# Patient Record
Sex: FEMALE | Race: WHITE | NOT HISPANIC OR LATINO | Employment: OTHER | URBAN - METROPOLITAN AREA
[De-identification: names, ages, dates, MRNs, and addresses within clinical notes are randomized per-mention and may not be internally consistent; named-entity substitution may affect disease eponyms.]

---

## 2017-01-05 ENCOUNTER — TELEPHONE (OUTPATIENT)
Dept: FAMILY MEDICINE CLINIC | Facility: CLINIC | Age: 82
End: 2017-01-05

## 2017-01-05 RX ORDER — EZETIMIBE AND SIMVASTATIN 10; 20 MG/1; MG/1
1 TABLET ORAL NIGHTLY
Qty: 90 TABLET | Refills: 3 | Status: SHIPPED | OUTPATIENT
Start: 2017-01-05 | End: 2017-01-09

## 2017-01-05 NOTE — TELEPHONE ENCOUNTER
LEFT MESSAGE LETTING HER KNOW WE ARE OUT OF THESES SAMPLES.    ----- Message from Sissy Sanon sent at 1/3/2017 10:11 AM EST -----  Contact: PATIENT  PT HAS 6 DAYS LFET OF MEDS AND WANTS TO KNOW IF YOU HAVE ANY SAMPLES SHE CAN .    10-20  OR 20-20 VYTORIN    [phone] - PLEASE LET PT KNOW

## 2017-01-09 ENCOUNTER — TELEPHONE (OUTPATIENT)
Dept: FAMILY MEDICINE CLINIC | Facility: CLINIC | Age: 82
End: 2017-01-09

## 2017-01-09 RX ORDER — SUMATRIPTAN 100 MG/1
TABLET, FILM COATED ORAL
Qty: 243 TABLET | Refills: 0 | Status: SHIPPED | OUTPATIENT
Start: 2017-01-09 | End: 2017-03-02 | Stop reason: SDUPTHER

## 2017-01-09 RX ORDER — ATORVASTATIN CALCIUM 20 MG/1
20 TABLET, FILM COATED ORAL DAILY
Qty: 30 TABLET | Refills: 5 | Status: SHIPPED | OUTPATIENT
Start: 2017-01-09 | End: 2017-08-09 | Stop reason: SDUPTHER

## 2017-01-09 NOTE — TELEPHONE ENCOUNTER
PATIENT AWARE. RX SENT    ----- Message from Rafat Cline MD sent at 1/9/2017  1:37 PM EST -----  Contact: pt  We can try atorvastatin 20 mg tablets one daily #30 with 5 refills.  Tell the patient that we will see how well this works regarding her cholesterol when she comes for her next checkup.  ----- Message -----     From: Fiordaliza Cruz MA     Sent: 1/6/2017   1:01 PM       To: Rafat Cline MD        ----- Message -----     From: Majo Gorman     Sent: 1/6/2017  11:56 AM       To: Fiordaliza Cruz MA    Pt said rx was sent to her mail order for her vytorin 10-20 1 tab at night #90 and pt said it will cost her $115 for a 3 month supply and she can't afford that. Pt is asking if dr carver will change her to lipitor because it is FREE at [facility] ln [phone].  I did not tell pt that they are not the same type of medication. I will let you explain that.    Pt's # [phone]

## 2017-01-09 NOTE — TELEPHONE ENCOUNTER
This has already been [facility] for [facility]    ----- Message from Sissy Sanon sent at 1/9/2017 12:37 PM EST -----  Contact: PATIENT  SUMAtriptan (IMITREX) 100 MG tablet     [facility] SENT A FAX.  CAN THE PT GET 90 DAYS?  THE COST IS BETTER.    [phone]- PLEASE CALL THE PATIENT

## 2017-02-09 ENCOUNTER — OFFICE VISIT (OUTPATIENT)
Dept: FAMILY MEDICINE CLINIC | Facility: CLINIC | Age: 82
End: 2017-02-09

## 2017-02-09 VITALS
HEART RATE: 56 BPM | HEIGHT: 65 IN | WEIGHT: 142 LBS | BODY MASS INDEX: 23.66 KG/M2 | OXYGEN SATURATION: 97 % | SYSTOLIC BLOOD PRESSURE: 98 MMHG | DIASTOLIC BLOOD PRESSURE: 58 MMHG | TEMPERATURE: 97.9 F

## 2017-02-09 DIAGNOSIS — R06.02 SOB (SHORTNESS OF BREATH): Primary | ICD-10-CM

## 2017-02-09 DIAGNOSIS — M54.6 ACUTE LEFT-SIDED THORACIC BACK PAIN: ICD-10-CM

## 2017-02-09 DIAGNOSIS — R06.00 PAROXYSMAL DYSPNEA: ICD-10-CM

## 2017-02-09 PROCEDURE — 99213 OFFICE O/P EST LOW 20 MIN: CPT

## 2017-02-09 NOTE — PROGRESS NOTES
Subjective   Sirisha Auguste is a 81 y.o. female. Patient is here today for   Chief Complaint   Patient presents with   • Shortness of Breath     patient says it hurts when she breaths in on her left side that radiates around the back          Vitals:    02/09/17 1243   BP: 98/58   Pulse: 56   Temp: 97.9 °F (36.6 °C)   SpO2: 97%     The following portions of the patient's history were reviewed and updated as appropriate: allergies, current medications, past family history, past medical history, past social history, past surgical history and problem list.    Past Medical History   Diagnosis Date   • Arthritis    • Breast cyst    • Chronic kidney disease    • GERD (gastroesophageal reflux disease)    • Hyperglycemia    • Hyperlipidemia    • Hypertension    • Migraines    • Murmur    • RBBB       Allergies   Allergen Reactions   • Sulfa Antibiotics       Social History     Social History   • Marital status:      Spouse name: N/A   • Number of children: N/A   • Years of education: N/A     Occupational History   • Not on file.     Social History Main Topics   • Smoking status: Former Smoker   • Smokeless tobacco: Not on file   • Alcohol use Not on file   • Drug use: Not on file   • Sexual activity: Not on file     Other Topics Concern   • Not on file     Social History Narrative        Current Outpatient Prescriptions:   •  acetaminophen (TYLENOL) 650 MG 8 hr tablet, Take 650 mg by mouth every 8 (eight) hours as needed for mild pain (1-3)., Disp: , Rfl:   •  ALPRAZolam (XANAX) 0.5 MG tablet, Take 1 tablet by mouth 3 (three) times a day as needed for anxiety., Disp: 90 tablet, Rfl: 0  •  atorvastatin (LIPITOR) 20 MG tablet, Take 1 tablet by mouth Daily., Disp: 30 tablet, Rfl: 5  •  butalbital-aspirin-caffeine-codeine (FIORINAL WITH CODEINE) -25-30 MG capsule, Take 1 capsule by mouth every 6 (six) hours as needed for headaches., Disp: 90 capsule, Rfl: 0  •  Calcium Carbonate-Vitamin D (CALCIUM + D PO), Take   by mouth., Disp: , Rfl:   •  Cholecalciferol (VITAMIN D-3) 1000 UNITS capsule, Take  by mouth., Disp: , Rfl:   •  fluticasone (FLONASE) 50 MCG/ACT nasal spray, into each nostril., Disp: , Rfl:   •  gabapentin (NEURONTIN) 100 MG capsule, Take 1 capsule by mouth 3 (three) times a day., Disp: 270 capsule, Rfl: 3  •  meclizine (ANTIVERT) 25 MG tablet, Take  by mouth., Disp: , Rfl:   •  metoprolol succinate XL (TOPROL-XL) 50 MG 24 hr tablet, TAKE 1 TABLET DAILY, Disp: 90 tablet, Rfl: 3  •  montelukast (SINGULAIR) 10 MG tablet, TAKE 1 TABLET EVERY NIGHT, Disp: 90 tablet, Rfl: 3  •  Multiple Minerals-Vitamins (ADVANCED CALCIUM/D/MAGNESIUM) tablet, Take  by mouth., Disp: , Rfl:   •  pantoprazole (PROTONIX) 40 MG EC tablet, TAKE 1 TABLET DAILY, Disp: 90 tablet, Rfl: 3  •  promethazine-codeine (PHENERGAN with CODEINE) 6.25-10 MG/5ML syrup, Take 5 mL by mouth Every 4 (Four) Hours As Needed for cough., Disp: 180 mL, Rfl: 0  •  SUMAtriptan (IMITREX) 100 MG tablet, TAKE 1 TABLET EVERY 2 HOURS AS NEEDED FOR MIGRAINE ( MG DAILY), Disp: 243 tablet, Rfl: 0  •  triamterene-hydrochlorothiazide (MAXZIDE-25) 37.5-25 MG per tablet, TAKE 1 TABLET DAILY, Disp: 90 tablet, Rfl: 1     Objective     History of Present Illness   The patient is here today because she has chronic shortness of air but has developed some back pain over the last week or so.  This pain seems to radiate around her left lateral rib cage at times    Review of Systems   Constitutional: Negative for chills and fever.   HENT:        Chronic allergic rhinitis with postnasal drainage   Respiratory: Negative for cough and wheezing.         The patient has been chronically short of air and she states that her present degree of shortness of air is not really any worse    Cardiovascular: Negative for chest pain, palpitations and leg swelling.   Gastrointestinal: Negative.    Musculoskeletal:        The patient states that she is having pain in the thoracic spine area off  and on and it radiates to the left side and around her lateral rib cage at times.  However the pain is not constant and not associated with acute shortness of air, nausea or diaphoresis.  There is some positional component to this discomfort but the patient does not state it is worse when she is standing and it does not necessarily go away when she lies down   Neurological:        Long history of migraine headaches   Psychiatric/Behavioral: Negative.        Physical Exam   Constitutional: She appears well-developed and well-nourished.   The patient is in no acute distress   Cardiovascular: Normal rate, regular rhythm and normal heart sounds.    Grade 3/6 systolic murmur heard best at the upper sternal border   Pulmonary/Chest: Effort normal and breath sounds normal. No respiratory distress. She has no wheezes. She has no rales.   Abdominal: Soft. Bowel sounds are normal. She exhibits no distension and no mass. There is no tenderness.   Musculoskeletal:   There is no direct tenderness of the thoracic spine.  There is slight tenderness in the parathoracic spinal muscles.   Skin:   In the area of the patient's discomfort.  She does have an eczematoid type rash in the upper anterior chest area which is not painful, just mildly pruritic   Psychiatric: She has a normal mood and affect.   Nursing note and vitals reviewed.      ASSESSMENT  #1 episodic mid back pain with occasional radiation laterally on the left side        #2 chronic shortness of air    DISCUSSION/SUMMARY   Vital signs are normal today.  The patient is in no acute distress.  The patient tells me that her shortness of air really is about the same as it has been for a number of years.  The patient had a complete cardiac workup in October 2015 including a stress test and echocardiogram.  Her stress test was normal.  Her echocardiogram showed mild mitral insufficiency and some left ventricular concentric hypertrophy.  She also had mild diastolic dysfunction.   Chest x-ray today was normal except for an elevated right hemidiaphragm which has been present for at least 5 years.  We discussed the patient's thoracic pain and I feel that the problem may be a muscle strain because she does do water aerobics on a regular basis.  The pain is not constant and does not seem to be worse when the patient is standing so I don't think she has had a compression fracture.  However I did tell her that if the pain becomes constant in the thoracic spine and is worse when she is standing to call us so we can set up a spine x-ray.  If the patient's shortness of air or worsens I will reorder another echocardiogram.  The patient states that she is better now with respect to the pain in that last night a baclofen tablet also helped her pain.  I told her that it would be okay for her to use baclofen and the over-the-counter acetaminophen that she has at home.  The patient has an appointment within the next 2 months with labs.  The patient's oxygen saturation today was 97%.    PLAN  Call if back pain worsens.  No Follow-up on file.

## 2017-02-14 ENCOUNTER — APPOINTMENT (OUTPATIENT)
Dept: CT IMAGING | Facility: HOSPITAL | Age: 82
End: 2017-02-14

## 2017-02-14 ENCOUNTER — HOSPITAL ENCOUNTER (EMERGENCY)
Facility: HOSPITAL | Age: 82
Discharge: HOME | End: 2017-02-14
Attending: EMERGENCY MEDICINE | Admitting: EMERGENCY MEDICINE

## 2017-02-14 VITALS
TEMPERATURE: 98.4 F | HEART RATE: 72 BPM | RESPIRATION RATE: 16 BRPM | OXYGEN SATURATION: 98 % | DIASTOLIC BLOOD PRESSURE: 63 MMHG | HEIGHT: 64 IN | WEIGHT: 137 LBS | BODY MASS INDEX: 23.39 KG/M2 | SYSTOLIC BLOOD PRESSURE: 139 MMHG

## 2017-02-14 DIAGNOSIS — R42 VERTIGO: ICD-10-CM

## 2017-02-14 DIAGNOSIS — R11.2 NON-INTRACTABLE VOMITING WITH NAUSEA, UNSPECIFIED VOMITING TYPE: Primary | ICD-10-CM

## 2017-02-14 LAB
ALBUMIN SERPL-MCNC: 4.5 G/DL (ref 3.5–5.2)
ALBUMIN/GLOB SERPL: 1.3 G/DL
ALP SERPL-CCNC: 89 U/L (ref 39–117)
ALT SERPL W P-5'-P-CCNC: 9 U/L (ref 1–33)
ANION GAP SERPL CALCULATED.3IONS-SCNC: 16.5 MMOL/L
AST SERPL-CCNC: 22 U/L (ref 1–32)
BASOPHILS # BLD AUTO: 0.01 10*3/MM3 (ref 0–0.2)
BASOPHILS NFR BLD AUTO: 0.1 % (ref 0–1.5)
BILIRUB SERPL-MCNC: 1.3 MG/DL (ref 0.1–1.2)
BILIRUB UR QL STRIP: NEGATIVE
BUN BLD-MCNC: 25 MG/DL (ref 8–23)
BUN/CREAT SERPL: 26 (ref 7–25)
CALCIUM SPEC-SCNC: 10.6 MG/DL (ref 8.6–10.5)
CHLORIDE SERPL-SCNC: 92 MMOL/L (ref 98–107)
CLARITY UR: CLEAR
CO2 SERPL-SCNC: 23.5 MMOL/L (ref 22–29)
COLOR UR: YELLOW
CREAT BLD-MCNC: 0.96 MG/DL (ref 0.57–1)
DEPRECATED RDW RBC AUTO: 40.2 FL (ref 37–54)
EOSINOPHIL # BLD AUTO: 0.01 10*3/MM3 (ref 0–0.7)
EOSINOPHIL NFR BLD AUTO: 0.1 % (ref 0.3–6.2)
ERYTHROCYTE [DISTWIDTH] IN BLOOD BY AUTOMATED COUNT: 12.6 % (ref 11.7–13)
GFR SERPL CREATININE-BSD FRML MDRD: 56 ML/MIN/1.73
GLOBULIN UR ELPH-MCNC: 3.4 GM/DL
GLUCOSE BLD-MCNC: 158 MG/DL (ref 65–99)
GLUCOSE UR STRIP-MCNC: NEGATIVE MG/DL
HCT VFR BLD AUTO: 40.7 % (ref 35.6–45.5)
HGB BLD-MCNC: 14 G/DL (ref 11.9–15.5)
HGB UR QL STRIP.AUTO: NEGATIVE
HOLD SPECIMEN: NORMAL
HOLD SPECIMEN: NORMAL
IMM GRANULOCYTES # BLD: 0.03 10*3/MM3 (ref 0–0.03)
IMM GRANULOCYTES NFR BLD: 0.2 % (ref 0–0.5)
KETONES UR QL STRIP: ABNORMAL
LEUKOCYTE ESTERASE UR QL STRIP.AUTO: NEGATIVE
LIPASE SERPL-CCNC: 35 U/L (ref 13–60)
LYMPHOCYTES # BLD AUTO: 1.01 10*3/MM3 (ref 0.9–4.8)
LYMPHOCYTES NFR BLD AUTO: 8.1 % (ref 19.6–45.3)
MCH RBC QN AUTO: 30.3 PG (ref 26.9–32)
MCHC RBC AUTO-ENTMCNC: 34.4 G/DL (ref 32.4–36.3)
MCV RBC AUTO: 88.1 FL (ref 80.5–98.2)
MONOCYTES # BLD AUTO: 0.21 10*3/MM3 (ref 0.2–1.2)
MONOCYTES NFR BLD AUTO: 1.7 % (ref 5–12)
NEUTROPHILS # BLD AUTO: 11.17 10*3/MM3 (ref 1.9–8.1)
NEUTROPHILS NFR BLD AUTO: 89.8 % (ref 42.7–76)
NITRITE UR QL STRIP: NEGATIVE
PH UR STRIP.AUTO: <=5 [PH] (ref 5–8)
PLATELET # BLD AUTO: 204 10*3/MM3 (ref 140–500)
PMV BLD AUTO: 11.4 FL (ref 6–12)
POTASSIUM BLD-SCNC: 3.4 MMOL/L (ref 3.5–5.2)
PROT SERPL-MCNC: 7.9 G/DL (ref 6–8.5)
PROT UR QL STRIP: NEGATIVE
RBC # BLD AUTO: 4.62 10*6/MM3 (ref 3.9–5.2)
SODIUM BLD-SCNC: 132 MMOL/L (ref 136–145)
SP GR UR STRIP: 1.02 (ref 1–1.03)
UROBILINOGEN UR QL STRIP: ABNORMAL
WBC NRBC COR # BLD: 12.44 10*3/MM3 (ref 4.5–10.7)
WHOLE BLOOD HOLD SPECIMEN: NORMAL
WHOLE BLOOD HOLD SPECIMEN: NORMAL

## 2017-02-14 PROCEDURE — 25010000002 ONDANSETRON PER 1 MG: Performed by: EMERGENCY MEDICINE

## 2017-02-14 PROCEDURE — 80053 COMPREHEN METABOLIC PANEL: CPT | Performed by: EMERGENCY MEDICINE

## 2017-02-14 PROCEDURE — 36415 COLL VENOUS BLD VENIPUNCTURE: CPT

## 2017-02-14 PROCEDURE — 72125 CT NECK SPINE W/O DYE: CPT

## 2017-02-14 PROCEDURE — 85025 COMPLETE CBC W/AUTO DIFF WBC: CPT | Performed by: EMERGENCY MEDICINE

## 2017-02-14 PROCEDURE — 70450 CT HEAD/BRAIN W/O DYE: CPT

## 2017-02-14 PROCEDURE — 99284 EMERGENCY DEPT VISIT MOD MDM: CPT

## 2017-02-14 PROCEDURE — 96374 THER/PROPH/DIAG INJ IV PUSH: CPT

## 2017-02-14 PROCEDURE — 83690 ASSAY OF LIPASE: CPT | Performed by: EMERGENCY MEDICINE

## 2017-02-14 PROCEDURE — 81003 URINALYSIS AUTO W/O SCOPE: CPT | Performed by: EMERGENCY MEDICINE

## 2017-02-14 PROCEDURE — 96361 HYDRATE IV INFUSION ADD-ON: CPT

## 2017-02-14 RX ORDER — ONDANSETRON 8 MG/1
8 TABLET, ORALLY DISINTEGRATING ORAL EVERY 8 HOURS PRN
Qty: 15 TABLET | Refills: 0 | Status: SHIPPED | OUTPATIENT
Start: 2017-02-14 | End: 2018-03-20

## 2017-02-14 RX ORDER — ONDANSETRON 2 MG/ML
4 INJECTION INTRAMUSCULAR; INTRAVENOUS ONCE
Status: COMPLETED | OUTPATIENT
Start: 2017-02-14 | End: 2017-02-14

## 2017-02-14 RX ORDER — PROMETHAZINE HYDROCHLORIDE 25 MG/1
50 SUPPOSITORY RECTAL EVERY 6 HOURS PRN
COMMUNITY
End: 2017-11-02

## 2017-02-14 RX ORDER — SODIUM CHLORIDE 0.9 % (FLUSH) 0.9 %
10 SYRINGE (ML) INJECTION AS NEEDED
Status: DISCONTINUED | OUTPATIENT
Start: 2017-02-14 | End: 2017-02-14 | Stop reason: HOSPADM

## 2017-02-14 RX ADMIN — ONDANSETRON 4 MG: 2 INJECTION INTRAMUSCULAR; INTRAVENOUS at 05:02

## 2017-02-14 RX ADMIN — SODIUM CHLORIDE 1000 ML: 9 INJECTION, SOLUTION INTRAVENOUS at 05:03

## 2017-02-14 NOTE — ED PROVIDER NOTES
EMERGENCY DEPARTMENT ENCOUNTER    CHIEF COMPLAINT  Chief Complaint: Vomiting  History given by: Pt  History limited by: N/A  Room Number: 10/10  PMD: Rafat Cline MD      HPI:  Pt is a 81 y.o. female who presents complaining of vomiting since 6:15 PM last night. She also c/o generalized weakness, nausea, dry cough, and dizziness. She denies diarrhea, abd pain, chest pain, and tinnitus. Family reports that pt has Meniere's disease. Dr. Glaser is her cardiologist and pt reports extensive cardiac workup in 2015.    Duration:  10 hours  Onset: Sudden  Timing: Constant  Location: GI  Radiation: None  Quality: N/A  Intensity/Severity: Moderate  Progression: Improving  Associated Symptoms: Generalized weakness, dizziness  Aggravating Factors: Nothing  Alleviating Factors: Nothing  Previous Episodes: None specified  Treatment before arrival: None specified    PAST MEDICAL HISTORY  Active Ambulatory Problems     Diagnosis Date Noted   • Benign essential hypertension 06/09/2016   • Hyperglycemia 06/09/2016   • Gastroesophageal reflux disease 06/09/2016   • Hyperlipidemia 06/09/2016   • Migraine 06/09/2016   • Renal insufficiency 06/09/2016   • Heart murmur 06/09/2016   • 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   • Herpes zoster 06/09/2016   • Acute thoracic back pain 02/09/2017   • Paroxysmal dyspnea 02/09/2017     Resolved Ambulatory Problems     Diagnosis Date Noted   • No Resolved Ambulatory Problems     Past Medical History   Diagnosis Date   • Arthritis    • Breast cyst    • Chronic kidney disease    • GERD (gastroesophageal reflux disease)    • Hypertension    • Migraines    • Murmur    • RBBB        PAST SURGICAL HISTORY  Past Surgical History   Procedure Laterality Date   • Bladder surgery     • Hysterectomy     • Breast cyst aspiration     • Oophorectomy         FAMILY HISTORY  Family History   Problem Relation Age of Onset   • Cancer Sister      breast   • Breast cancer Sister    • Cancer Maternal Aunt       breast   • Breast cancer Maternal Aunt        SOCIAL HISTORY  Social History     Social History   • Marital status:      Spouse name: N/A   • Number of children: N/A   • Years of education: N/A     Occupational History   • Not on file.     Social History Main Topics   • Smoking status: Former Smoker   • Smokeless tobacco: Not on file   • Alcohol use Not on file   • Drug use: Not on file   • Sexual activity: Not on file     Other Topics Concern   • Not on file     Social History Narrative       ALLERGIES  Sulfa antibiotics    REVIEW OF SYSTEMS  Review of Systems   Constitutional: Negative for chills and fever.   HENT: Negative for sore throat and trouble swallowing.    Eyes: Negative for visual disturbance.   Respiratory: Positive for cough (dry). Negative for shortness of breath.    Cardiovascular: Negative for chest pain, palpitations and leg swelling.   Gastrointestinal: Positive for nausea and vomiting. Negative for abdominal pain and diarrhea.   Endocrine: Negative.    Genitourinary: Negative for decreased urine volume, dysuria and frequency.   Musculoskeletal: Negative for neck pain.   Skin: Negative for rash.   Allergic/Immunologic: Negative.    Neurological: Positive for dizziness and weakness (generalized). Negative for syncope, numbness and headaches.   Hematological: Negative.    Psychiatric/Behavioral: Negative.    All other systems reviewed and are negative.      PHYSICAL EXAM  ED Triage Vitals   Temp Heart Rate Resp BP SpO2   02/14/17 0049 02/14/17 0049 02/14/17 0049 02/14/17 0049 02/14/17 0049   97.8 °F (36.6 °C) 77 16 155/62 98 %      Temp src Heart Rate Source Patient Position BP Location FiO2 (%)   02/14/17 0049 02/14/17 0049 -- -- --   Oral Monitor          Physical Exam   Constitutional: She is oriented to person, place, and time and well-developed, well-nourished, and in no distress. No distress.   HENT:   Head: Normocephalic and atraumatic.   Eyes: EOM are normal. Pupils are equal, round,  and reactive to light.   Neck: Normal range of motion. Neck supple.   Cardiovascular: Normal rate, regular rhythm and normal heart sounds.    Pulmonary/Chest: Effort normal and breath sounds normal. No respiratory distress.   Abdominal: Soft. There is no tenderness. There is no rebound and no guarding.   Musculoskeletal: Normal range of motion. She exhibits no edema.   Neurological: She is alert and oriented to person, place, and time. She has normal sensation and normal strength.   Skin: Skin is warm and dry. No rash noted.   Psychiatric: Mood and affect normal.   Nursing note and vitals reviewed.      LAB RESULTS  Lab Results (last 24 hours)     Procedure Component Value Units Date/Time    CBC & Differential [[id number]] Collected:  02/14/17 0112    Specimen:  Blood Updated:  02/14/17 0133    Narrative:       The following orders were created for panel order CBC & Differential.  Procedure                               Abnormality         Status                     ---------                               -----------         ------                     CBC Auto Differential[[id number]]         Abnormal            Final result                 Please view results for these tests on the individual orders.    Comprehensive Metabolic Panel [[id number]]  (Abnormal) Collected:  02/14/17 0112    Specimen:  Blood Updated:  02/14/17 0149     Glucose 158 (H) mg/dL      BUN 25 (H) mg/dL      Creatinine 0.96 mg/dL      Sodium 132 (L) mmol/L      Potassium 3.4 (L) mmol/L      Chloride 92 (L) mmol/L      CO2 23.5 mmol/L      Calcium 10.6 (H) mg/dL      Total Protein 7.9 g/dL      Albumin 4.50 g/dL      ALT (SGPT) 9 U/L      AST (SGOT) 22 U/L      Alkaline Phosphatase 89 U/L      Total Bilirubin 1.3 (H) mg/dL      eGFR Non African Amer 56 (L) mL/min/1.73      Globulin 3.4 gm/dL      A/G Ratio 1.3 g/dL      BUN/Creatinine Ratio 26.0 (H)      Anion Gap 16.5 mmol/L     Narrative:       The MDRD GFR formula is only valid for adults with  stable renal function between ages 18 and 70.    Lipase [[id number]]  (Normal) Collected:  02/14/17 0112    Specimen:  Blood Updated:  02/14/17 0148     Lipase 35 U/L     CBC Auto Differential [[id number]]  (Abnormal) Collected:  02/14/17 0112    Specimen:  Blood Updated:  02/14/17 0133     WBC 12.44 (H) 10*3/mm3      RBC 4.62 10*6/mm3      Hemoglobin 14.0 g/dL      Hematocrit 40.7 %      MCV 88.1 fL      MCH 30.3 pg      MCHC 34.4 g/dL      RDW 12.6 %      RDW-SD 40.2 fl      MPV 11.4 fL      Platelets 204 10*3/mm3      Neutrophil % 89.8 (H) %      Lymphocyte % 8.1 (L) %      Monocyte % 1.7 (L) %      Eosinophil % 0.1 (L) %      Basophil % 0.1 %      Immature Grans % 0.2 %      Neutrophils, Absolute 11.17 (H) 10*3/mm3      Lymphocytes, Absolute 1.01 10*3/mm3      Monocytes, Absolute 0.21 10*3/mm3      Eosinophils, Absolute 0.01 10*3/mm3      Basophils, Absolute 0.01 10*3/mm3      Immature Grans, Absolute 0.03 10*3/mm3     Urinalysis With / Culture If Indicated [[id number]]  (Abnormal) Collected:  02/14/17 0420    Specimen:  Urine from Urine, Clean Catch Updated:  02/14/17 0431     Color, UA Yellow      Appearance, UA Clear      pH, UA <=5.0      Specific Gravity, UA 1.019      Glucose, UA Negative      Ketones, UA 15 mg/dL (1+) (A)      Bilirubin, UA Negative      Blood, UA Negative      Protein, UA Negative      Leuk Esterase, UA Negative      Nitrite, UA Negative      Urobilinogen, UA 0.2 E.U./dL     Narrative:       Urine microscopic not indicated.          I ordered the above labs and reviewed the results    RADIOLOGY  CT Head Without Contrast   Preliminary Result   No acute intracranial pathology.                  CT Cervical Spine Without Contrast   Preliminary Result   CT CERVICAL SPINE WITHOUT CONTRAST.       TECHNIQUE: Routine axial images of the cervical spine with coronal and   sagittal reconstructed images.       HISTORY:  Dizziness.       COMPARISON:  No prior studies for comparison.       FINDINGS:     Vertebral body height is normal, no acute fracture is seen.  Multilevel   loss of intervertebral disc height and spurring, most severe at C5-6 and   C6-7. Milder changes are seen at other levels. There is straightening of   the cervical spine which may be due to muscle spasm.       Prevertebral soft tissue is unremarkable.            IMPRESSION:    No acute fracture of the cervical spine.                         I ordered the above noted radiological studies. Interpreted by radiologist. Discussed with radiologist. Reviewed by me in PACS.       PROCEDURES  Procedures      PROGRESS AND CONSULTS  ED Course   4:21 AM:  Vitals: BP: 155/62 HR: 77 Temp: 97.8 °F (36.6 °C) (Oral) O2 sat: 98%  D/w pt plan for labs and CT Head for further evaluation. Ordered IVF for hydration, Zofran for nausea. Pt understands and agrees with the plan, all questions answered.    6:08 AM:  Vitals: BP: 142/90 HR: 70 Temp: 97 °F (36.1 °C) O2 sat: 97%  Rechecked pt. Pt is resting comfortably and reports improvement in her nausea after medication. Discussed results of imaging, which were unremarkable, and the plan for discharge with a prescription for Zofran to manage her nausea. Pt understands and agrees with the plan, all questions answered.    MEDICAL DECISION MAKING  Results were reviewed/discussed with the patient and they were also made aware of online access. Pt also made aware that some labs, such as cultures, will not be resulted during ER visit and follow up with PMD is necessary.     MDM  Number of Diagnoses or Management Options     Amount and/or Complexity of Data Reviewed  Clinical lab tests: ordered and reviewed (White count 12)  Tests in the radiology section of CPT®: ordered and reviewed (CT Head and C-Spine: negative acute)  Discussion of test results with the performing providers: yes  Independent visualization of images, tracings, or specimens: yes    Patient Progress  Patient progress: stable         DIAGNOSIS  Final  diagnoses:   Non-intractable vomiting with nausea, unspecified vomiting type   Vertigo       DISPOSITION  DISCHARGE    Patient discharged in stable condition.    Reviewed implications of results, diagnosis, meds, responsibility to follow up, warning signs and symptoms of possible worsening, potential complications and reasons to return to ER, including any new or worsening symptoms.    Patient/Family voiced understanding of above instructions.    Discussed plan for discharge, as there is no emergent indication for admission.  Pt/family is agreeable and understands need for follow up and repeat testing.  Pt is aware that discharge does not mean that nothing is wrong but it indicates no emergency is present that requires admission and they must continue care with follow-up as given below or physician of their choice.     FOLLOW-UP  Rafat Cline MD  [id number] [id number] [phone]    Schedule an appointment as soon as possible for a visit           Medication List      New Prescriptions          ondansetron ODT 8 MG disintegrating tablet   Commonly known as:  ZOFRAN ODT   Take 1 tablet by mouth Every 8 (Eight) Hours As Needed for nausea or   vomiting.           Latest Documented Vital Signs:  As of 7:01 AM  BP- 139/63 HR- 72 Temp- 98.4 °F (36.9 °C) (Tympanic) O2 sat- 98%    --  Documentation assistance provided by bart Fair for Dr. Harry.  Information recorded by the scribe was done at my direction and has been verified and validated by me.     Kashif Fair  02/14/17 0613       Rajendra Harry MD  02/14/17 0701

## 2017-02-15 ENCOUNTER — TELEPHONE (OUTPATIENT)
Dept: SOCIAL WORK | Facility: HOSPITAL | Age: 82
End: 2017-02-15

## 2017-02-15 NOTE — TELEPHONE ENCOUNTER
ED follow-up phone call. States that she feels better and has no more N/V. Has upcoming andre't w/ PCP

## 2017-03-02 RX ORDER — SUMATRIPTAN 100 MG/1
100 TABLET, FILM COATED ORAL ONCE AS NEEDED
Qty: 27 TABLET | Refills: 3 | Status: SHIPPED | OUTPATIENT
Start: 2017-03-02 | End: 2017-07-26 | Stop reason: SDUPTHER

## 2017-04-20 DIAGNOSIS — I10 ESSENTIAL HYPERTENSION: Primary | ICD-10-CM

## 2017-04-20 DIAGNOSIS — E78.5 HYPERLIPIDEMIA, UNSPECIFIED HYPERLIPIDEMIA TYPE: ICD-10-CM

## 2017-04-28 LAB
ALBUMIN SERPL-MCNC: 4.6 G/DL (ref 3.5–5.2)
ALBUMIN/GLOB SERPL: 1.4 G/DL
ALP SERPL-CCNC: 93 U/L (ref 39–117)
ALT SERPL-CCNC: 7 U/L (ref 1–33)
AST SERPL-CCNC: 22 U/L (ref 1–32)
BILIRUB SERPL-MCNC: 0.9 MG/DL (ref 0.1–1.2)
BUN SERPL-MCNC: 22 MG/DL (ref 8–23)
BUN/CREAT SERPL: 21.2 (ref 7–25)
CALCIUM SERPL-MCNC: 10.8 MG/DL (ref 8.6–10.5)
CHLORIDE SERPL-SCNC: 96 MMOL/L (ref 98–107)
CHOLEST SERPL-MCNC: 194 MG/DL (ref 0–200)
CO2 SERPL-SCNC: 28.5 MMOL/L (ref 22–29)
CREAT SERPL-MCNC: 1.04 MG/DL (ref 0.57–1)
GLOBULIN SER CALC-MCNC: 3.3 GM/DL
GLUCOSE SERPL-MCNC: 98 MG/DL (ref 65–99)
HDLC SERPL-MCNC: 48 MG/DL (ref 40–60)
LDLC SERPL CALC-MCNC: 105 MG/DL (ref 0–100)
LDLC/HDLC SERPL: 2.18 {RATIO}
POTASSIUM SERPL-SCNC: 4.2 MMOL/L (ref 3.5–5.2)
PROT SERPL-MCNC: 7.9 G/DL (ref 6–8.5)
SODIUM SERPL-SCNC: 139 MMOL/L (ref 136–145)
TRIGL SERPL-MCNC: 206 MG/DL (ref 0–150)
VLDLC SERPL CALC-MCNC: 41.2 MG/DL (ref 5–40)

## 2017-05-08 ENCOUNTER — OFFICE VISIT (OUTPATIENT)
Dept: FAMILY MEDICINE CLINIC | Facility: CLINIC | Age: 82
End: 2017-05-08

## 2017-05-08 VITALS
BODY MASS INDEX: 23.56 KG/M2 | DIASTOLIC BLOOD PRESSURE: 72 MMHG | HEIGHT: 64 IN | SYSTOLIC BLOOD PRESSURE: 128 MMHG | RESPIRATION RATE: 16 BRPM | OXYGEN SATURATION: 97 % | WEIGHT: 138 LBS | HEART RATE: 65 BPM | TEMPERATURE: 97.7 F

## 2017-05-08 DIAGNOSIS — N28.9 RENAL INSUFFICIENCY: ICD-10-CM

## 2017-05-08 DIAGNOSIS — R42 RECURRENT VERTIGO: Primary | ICD-10-CM

## 2017-05-08 DIAGNOSIS — I10 BENIGN ESSENTIAL HYPERTENSION: ICD-10-CM

## 2017-05-08 DIAGNOSIS — E78.2 MIXED HYPERLIPIDEMIA: ICD-10-CM

## 2017-05-08 PROCEDURE — 99213 OFFICE O/P EST LOW 20 MIN: CPT

## 2017-05-08 RX ORDER — VITAMIN E 268 MG
400 CAPSULE ORAL DAILY
COMMUNITY
End: 2018-03-20

## 2017-05-17 ENCOUNTER — OFFICE VISIT (OUTPATIENT)
Dept: FAMILY MEDICINE CLINIC | Facility: CLINIC | Age: 82
End: 2017-05-17

## 2017-05-17 VITALS
RESPIRATION RATE: 18 BRPM | TEMPERATURE: 97.7 F | SYSTOLIC BLOOD PRESSURE: 122 MMHG | BODY MASS INDEX: 23.87 KG/M2 | DIASTOLIC BLOOD PRESSURE: 74 MMHG | HEIGHT: 64 IN | WEIGHT: 139.8 LBS | OXYGEN SATURATION: 98 % | HEART RATE: 58 BPM

## 2017-05-17 DIAGNOSIS — R05.9 COUGH: Primary | ICD-10-CM

## 2017-05-17 PROCEDURE — 99213 OFFICE O/P EST LOW 20 MIN: CPT | Performed by: NURSE PRACTITIONER

## 2017-05-17 PROCEDURE — 85025 COMPLETE CBC W/AUTO DIFF WBC: CPT | Performed by: NURSE PRACTITIONER

## 2017-05-17 RX ORDER — AZITHROMYCIN 250 MG/1
TABLET, FILM COATED ORAL
Qty: 6 TABLET | Refills: 0 | Status: SHIPPED | OUTPATIENT
Start: 2017-05-17 | End: 2017-05-23

## 2017-05-23 ENCOUNTER — OFFICE VISIT (OUTPATIENT)
Dept: FAMILY MEDICINE CLINIC | Facility: CLINIC | Age: 82
End: 2017-05-23

## 2017-05-23 VITALS
OXYGEN SATURATION: 97 % | BODY MASS INDEX: 23.97 KG/M2 | HEART RATE: 62 BPM | DIASTOLIC BLOOD PRESSURE: 72 MMHG | HEIGHT: 64 IN | WEIGHT: 140.4 LBS | TEMPERATURE: 97.8 F | RESPIRATION RATE: 16 BRPM | SYSTOLIC BLOOD PRESSURE: 112 MMHG

## 2017-05-23 DIAGNOSIS — R05.9 COUGH: ICD-10-CM

## 2017-05-23 DIAGNOSIS — H61.22 IMPACTED CERUMEN OF LEFT EAR: ICD-10-CM

## 2017-05-23 DIAGNOSIS — J30.89 SEASONAL ALLERGIC RHINITIS DUE TO OTHER ALLERGIC TRIGGER: Primary | ICD-10-CM

## 2017-05-23 PROCEDURE — 69210 REMOVE IMPACTED EAR WAX UNI: CPT | Performed by: NURSE PRACTITIONER

## 2017-05-23 PROCEDURE — 99213 OFFICE O/P EST LOW 20 MIN: CPT | Performed by: NURSE PRACTITIONER

## 2017-05-23 RX ORDER — FLUTICASONE PROPIONATE 50 MCG
2 SPRAY, SUSPENSION (ML) NASAL DAILY
Qty: 1 EACH | Refills: 5 | Status: SHIPPED | OUTPATIENT
Start: 2017-05-23 | End: 2017-07-11 | Stop reason: SDUPTHER

## 2017-06-07 RX ORDER — MECLIZINE HYDROCHLORIDE 25 MG/1
25 TABLET ORAL 3 TIMES DAILY PRN
Qty: 30 TABLET | Refills: 2 | Status: SHIPPED | OUTPATIENT
Start: 2017-06-07 | End: 2017-07-11 | Stop reason: SDUPTHER

## 2017-06-19 RX ORDER — TRIAMTERENE AND HYDROCHLOROTHIAZIDE 37.5; 25 MG/1; MG/1
TABLET ORAL
Qty: 90 TABLET | Refills: 3 | Status: SHIPPED | OUTPATIENT
Start: 2017-06-19 | End: 2018-05-22 | Stop reason: SDUPTHER

## 2017-06-30 RX ORDER — METOPROLOL SUCCINATE 50 MG/1
TABLET, EXTENDED RELEASE ORAL
Qty: 90 TABLET | Refills: 2 | Status: SHIPPED | OUTPATIENT
Start: 2017-06-30 | End: 2018-03-27 | Stop reason: SDUPTHER

## 2017-07-11 RX ORDER — MECLIZINE HYDROCHLORIDE 25 MG/1
25 TABLET ORAL 3 TIMES DAILY PRN
Qty: 180 TABLET | Refills: 1 | Status: SHIPPED | OUTPATIENT
Start: 2017-07-11

## 2017-07-11 RX ORDER — FLUTICASONE PROPIONATE 50 MCG
2 SPRAY, SUSPENSION (ML) NASAL DAILY
Qty: 3 EACH | Refills: 3 | Status: SHIPPED | OUTPATIENT
Start: 2017-07-11 | End: 2018-03-20

## 2017-07-11 RX ORDER — PROMETHAZINE HYDROCHLORIDE AND CODEINE PHOSPHATE 6.25; 1 MG/5ML; MG/5ML
5 SYRUP ORAL EVERY 4 HOURS PRN
Qty: 180 ML | Refills: 0 | Status: SHIPPED | OUTPATIENT
Start: 2017-07-11 | End: 2017-11-02 | Stop reason: SDUPTHER

## 2017-07-26 RX ORDER — SUMATRIPTAN 100 MG/1
TABLET, FILM COATED ORAL
Qty: 27 TABLET | Refills: 3 | Status: SHIPPED | OUTPATIENT
Start: 2017-07-26 | End: 2018-03-21 | Stop reason: SDUPTHER

## 2017-08-10 RX ORDER — ATORVASTATIN CALCIUM 20 MG/1
TABLET, FILM COATED ORAL
Qty: 30 TABLET | Refills: 4 | Status: SHIPPED | OUTPATIENT
Start: 2017-08-10 | End: 2018-05-31 | Stop reason: ALTCHOICE

## 2017-08-11 ENCOUNTER — OFFICE VISIT (OUTPATIENT)
Dept: FAMILY MEDICINE CLINIC | Facility: CLINIC | Age: 82
End: 2017-08-11

## 2017-08-11 VITALS
HEIGHT: 64 IN | TEMPERATURE: 98.3 F | BODY MASS INDEX: 23.22 KG/M2 | RESPIRATION RATE: 16 BRPM | DIASTOLIC BLOOD PRESSURE: 70 MMHG | SYSTOLIC BLOOD PRESSURE: 110 MMHG | WEIGHT: 136 LBS

## 2017-08-11 DIAGNOSIS — R21 RASH: Primary | ICD-10-CM

## 2017-08-11 PROCEDURE — 99213 OFFICE O/P EST LOW 20 MIN: CPT | Performed by: INTERNAL MEDICINE

## 2017-08-11 NOTE — PROGRESS NOTES
Subjective   Sirisha Auguste is a 82 y.o. female. Patient is here today for   Chief Complaint   Patient presents with   • Rash     c/o itchy rash on her stomach and back          Vitals:    08/11/17 1249   BP: 110/70   Resp: 16   Temp: 98.3 °F (36.8 °C)     The following portions of the patient's history were reviewed and updated as appropriate: allergies, current medications, past family history, past medical history, past social history, past surgical history and problem list.    Past Medical History:   Diagnosis Date   • Arthritis    • Breast cyst    • Chronic kidney disease    • GERD (gastroesophageal reflux disease)    • Hyperglycemia    • Hyperlipidemia    • Hypertension    • Migraines    • Murmur    • RBBB       Allergies   Allergen Reactions   • Sulfa Antibiotics       Social History     Social History   • Marital status:      Spouse name: N/A   • Number of children: N/A   • Years of education: N/A     Occupational History   • Not on file.     Social History Main Topics   • Smoking status: Former Smoker   • Smokeless tobacco: Former User   • Alcohol use Not on file   • Drug use: No   • Sexual activity: Not on file     Other Topics Concern   • Not on file     Social History Narrative        Current Outpatient Prescriptions:   •  acetaminophen (TYLENOL) 650 MG 8 hr tablet, Take 650 mg by mouth every 8 (eight) hours as needed for mild pain (1-3)., Disp: , Rfl:   •  ALPRAZolam (XANAX) 0.5 MG tablet, Take 1 tablet by mouth 3 (three) times a day as needed for anxiety., Disp: 90 tablet, Rfl: 0  •  atorvastatin (LIPITOR) 20 MG tablet, TAKE 1 TABLET BY MOUTH ONE TIME A DAY , Disp: 30 tablet, Rfl: 4  •  butalbital-aspirin-caffeine-codeine (FIORINAL WITH CODEINE) -09-30 MG capsule, Take 1 capsule by mouth every 6 (six) hours as needed for headaches., Disp: 90 capsule, Rfl: 0  •  Calcium Carbonate-Vitamin D (CALCIUM + D PO), Take  by mouth., Disp: , Rfl:   •  Cholecalciferol (VITAMIN D-3) 1000 UNITS  capsule, Take  by mouth., Disp: , Rfl:   •  fluticasone (FLONASE) 50 MCG/ACT nasal spray, 2 sprays into each nostril Daily., Disp: 3 each, Rfl: 3  •  gabapentin (NEURONTIN) 100 MG capsule, Take 1 capsule by mouth 3 (three) times a day., Disp: 270 capsule, Rfl: 3  •  meclizine (ANTIVERT) 25 MG tablet, Take 1 tablet by mouth 3 (Three) Times a Day As Needed for dizziness., Disp: 180 tablet, Rfl: 1  •  metoprolol succinate XL (TOPROL-XL) 50 MG 24 hr tablet, TAKE 1 TABLET DAILY, Disp: 90 tablet, Rfl: 2  •  montelukast (SINGULAIR) 10 MG tablet, TAKE 1 TABLET EVERY NIGHT, Disp: 90 tablet, Rfl: 3  •  Multiple Minerals-Vitamins (ADVANCED CALCIUM/D/MAGNESIUM) tablet, Take  by mouth., Disp: , Rfl:   •  ondansetron ODT (ZOFRAN ODT) 8 MG disintegrating tablet, Take 1 tablet by mouth Every 8 (Eight) Hours As Needed for nausea or vomiting., Disp: 15 tablet, Rfl: 0  •  pantoprazole (PROTONIX) 40 MG EC tablet, TAKE 1 TABLET DAILY, Disp: 90 tablet, Rfl: 3  •  promethazine (PHENERGAN) 25 MG suppository, Insert 50 mg into the rectum Every 6 (Six) Hours As Needed for nausea or vomiting., Disp: , Rfl:   •  promethazine-codeine (PHENERGAN with CODEINE) 6.25-10 MG/5ML syrup, Take 5 mL by mouth Every 4 (Four) Hours As Needed for Cough., Disp: 180 mL, Rfl: 0  •  SUMAtriptan (IMITREX) 100 MG tablet, TAKE 1 TABLET ONCE AS NEEDED FOR MIGRAINE FOR UP TO 1 DOSE, Disp: 27 tablet, Rfl: 3  •  triamterene-hydrochlorothiazide (MAXZIDE-25) 37.5-25 MG per tablet, TAKE 1 TABLET DAILY, Disp: 90 tablet, Rfl: 3  •  vitamin E 400 UNIT capsule, Take 400 Units by mouth Daily., Disp: , Rfl:   •  triamcinolone (KENALOG) 0.1 % ointment, Apply  topically 2 (Two) Times a Day., Disp: 80 g, Rfl: 0     Objective     History of Present Illness Sirisha complains of a pruritic rash around her waist that started about 2 weeks ago.  She denies any exposure to contact allergens or any change in her soap or laundry detergent.  She uses Ivory soap.  She does have a  dermatologist but has not seen her in over a year.    Review of Systems   Constitutional: Negative.    Skin: Positive for rash.       Physical Exam   Constitutional: She appears well-developed and well-nourished.   Skin:   Fine slightly scaly erythematous patch below right lower beltline.  There are also some smaller patches posteriorly.   Psychiatric: She has a normal mood and affect.   Vitals reviewed.      ASSESSMENT     Problem List Items Addressed This Visit        Musculoskeletal and Integument    Rash - Primary    Relevant Medications    triamcinolone (KENALOG) 0.1 % ointment          PLAN  Patient Instructions   The rash looks like eczema.  Apply triamcinolone ointment twice a day.  Follow-up with dermatology if no improvement.    Return if symptoms worsen or fail to improve.

## 2017-08-11 NOTE — PATIENT INSTRUCTIONS
The rash looks like eczema.  Apply triamcinolone ointment twice a day.  Follow-up with dermatology if no improvement.

## 2017-09-01 RX ORDER — PANTOPRAZOLE SODIUM 40 MG/1
TABLET, DELAYED RELEASE ORAL
Qty: 90 TABLET | Refills: 3 | Status: SHIPPED | OUTPATIENT
Start: 2017-09-01 | End: 2018-03-20

## 2017-10-05 DIAGNOSIS — I10 ESSENTIAL HYPERTENSION: Primary | ICD-10-CM

## 2017-10-05 DIAGNOSIS — E78.5 HYPERLIPIDEMIA, UNSPECIFIED HYPERLIPIDEMIA TYPE: ICD-10-CM

## 2017-10-11 LAB
ALBUMIN SERPL-MCNC: 4.7 G/DL (ref 3.5–5.2)
ALBUMIN/GLOB SERPL: 2 G/DL
ALP SERPL-CCNC: 65 U/L (ref 39–117)
ALT SERPL-CCNC: 27 U/L (ref 1–33)
AST SERPL-CCNC: 22 U/L (ref 1–32)
BILIRUB SERPL-MCNC: 0.5 MG/DL (ref 0.1–1.2)
BUN SERPL-MCNC: 15 MG/DL (ref 8–23)
BUN/CREAT SERPL: 11.6 (ref 7–25)
CALCIUM SERPL-MCNC: 9.8 MG/DL (ref 8.6–10.5)
CHLORIDE SERPL-SCNC: 101 MMOL/L (ref 98–107)
CHOLEST SERPL-MCNC: 109 MG/DL (ref 0–200)
CO2 SERPL-SCNC: 25.6 MMOL/L (ref 22–29)
CREAT SERPL-MCNC: 1.29 MG/DL (ref 0.57–1)
GLOBULIN SER CALC-MCNC: 2.3 GM/DL
GLUCOSE SERPL-MCNC: 117 MG/DL (ref 65–99)
HDLC SERPL-MCNC: 29 MG/DL (ref 40–60)
LDLC SERPL CALC-MCNC: 33 MG/DL (ref 0–100)
LDLC/HDLC SERPL: 1.13 {RATIO}
POTASSIUM SERPL-SCNC: 4.6 MMOL/L (ref 3.5–5.2)
PROT SERPL-MCNC: 7 G/DL (ref 6–8.5)
SODIUM SERPL-SCNC: 142 MMOL/L (ref 136–145)
TRIGL SERPL-MCNC: 236 MG/DL (ref 0–150)
VLDLC SERPL CALC-MCNC: 47.2 MG/DL (ref 5–40)

## 2017-10-24 ENCOUNTER — TRANSCRIBE ORDERS (OUTPATIENT)
Dept: ADMINISTRATIVE | Facility: HOSPITAL | Age: 82
End: 2017-10-24

## 2017-10-24 DIAGNOSIS — Z12.31 VISIT FOR SCREENING MAMMOGRAM: Primary | ICD-10-CM

## 2017-11-01 ENCOUNTER — TELEPHONE (OUTPATIENT)
Dept: FAMILY MEDICINE CLINIC | Facility: CLINIC | Age: 82
End: 2017-11-01

## 2017-11-01 RX ORDER — ALPRAZOLAM 0.5 MG/1
0.5 TABLET ORAL 3 TIMES DAILY PRN
Qty: 90 TABLET | Refills: 0 | Status: SHIPPED | OUTPATIENT
Start: 2017-11-01 | End: 2018-12-04 | Stop reason: SDUPTHER

## 2017-11-02 ENCOUNTER — OFFICE VISIT (OUTPATIENT)
Dept: FAMILY MEDICINE CLINIC | Facility: CLINIC | Age: 82
End: 2017-11-02

## 2017-11-02 VITALS
BODY MASS INDEX: 23.39 KG/M2 | DIASTOLIC BLOOD PRESSURE: 68 MMHG | OXYGEN SATURATION: 98 % | WEIGHT: 137 LBS | TEMPERATURE: 98 F | HEIGHT: 64 IN | SYSTOLIC BLOOD PRESSURE: 106 MMHG | HEART RATE: 73 BPM | RESPIRATION RATE: 16 BRPM

## 2017-11-02 DIAGNOSIS — N28.9 RENAL INSUFFICIENCY: ICD-10-CM

## 2017-11-02 DIAGNOSIS — R73.9 HYPERGLYCEMIA: Primary | ICD-10-CM

## 2017-11-02 DIAGNOSIS — I10 BENIGN ESSENTIAL HYPERTENSION: ICD-10-CM

## 2017-11-02 DIAGNOSIS — R42 RECURRENT VERTIGO: ICD-10-CM

## 2017-11-02 DIAGNOSIS — E78.2 MIXED HYPERLIPIDEMIA: ICD-10-CM

## 2017-11-02 DIAGNOSIS — G43.709 CHRONIC MIGRAINE WITHOUT AURA WITHOUT STATUS MIGRAINOSUS, NOT INTRACTABLE: ICD-10-CM

## 2017-11-02 PROCEDURE — 99213 OFFICE O/P EST LOW 20 MIN: CPT

## 2017-11-02 RX ORDER — INFLUENZA A VIRUS A/MICHIGAN/45/2015 X-275 (H1N1) ANTIGEN (FORMALDEHYDE INACTIVATED), INFLUENZA A VIRUS A/SINGAPORE/INFIMH-16-0019/2016 IVR-186 (H3N2) ANTIGEN (FORMALDEHYDE INACTIVATED), AND INFLUENZA B VIRUS B/MARYLAND/15/2016 BX-69A (A B/COLORADO/6/2017-LIKE VIRUS) ANTIGEN (FORMALDEHYDE INACTIVATED) 60; 60; 60 UG/.5ML; UG/.5ML; UG/.5ML
INJECTION, SUSPENSION INTRAMUSCULAR
Refills: 0 | COMMUNITY
Start: 2017-09-18 | End: 2018-03-20

## 2017-11-02 RX ORDER — TRIAMCINOLONE ACETONIDE 0.1 %
PASTE (GRAM) DENTAL
Refills: 0 | COMMUNITY
Start: 2017-10-03 | End: 2018-03-20 | Stop reason: SDUPTHER

## 2017-11-02 RX ORDER — PROMETHAZINE HYDROCHLORIDE AND CODEINE PHOSPHATE 6.25; 1 MG/5ML; MG/5ML
5 SYRUP ORAL EVERY 4 HOURS PRN
Qty: 180 ML | Refills: 1 | Status: SHIPPED | OUTPATIENT
Start: 2017-11-02 | End: 2018-03-20

## 2017-11-02 NOTE — PROGRESS NOTES
Subjective   Sirisha Auguste is a 82 y.o. female. Patient is here today for   Chief Complaint   Patient presents with   • Follow-up     labs           Vitals:    11/02/17 0843   BP: 106/68   Pulse: 73   Resp: 16   Temp: 98 °F (36.7 °C)   SpO2: 98%     The following portions of the patient's history were reviewed and updated as appropriate: allergies, current medications, past family history, past medical history, past social history, past surgical history and problem list.    Past Medical History:   Diagnosis Date   • Arthritis    • Breast cyst    • Chronic kidney disease    • GERD (gastroesophageal reflux disease)    • Hyperglycemia    • Hyperlipidemia    • Hypertension    • Migraines    • Murmur    • RBBB       Allergies   Allergen Reactions   • Sulfa Antibiotics       Social History     Social History   • Marital status:      Spouse name: N/A   • Number of children: N/A   • Years of education: N/A     Occupational History   • Not on file.     Social History Main Topics   • Smoking status: Former Smoker   • Smokeless tobacco: Former User   • Alcohol use Not on file   • Drug use: No   • Sexual activity: Not on file     Other Topics Concern   • Not on file     Social History Narrative        Current Outpatient Prescriptions:   •  acetaminophen (TYLENOL) 650 MG 8 hr tablet, Take 650 mg by mouth every 8 (eight) hours as needed for mild pain (1-3)., Disp: , Rfl:   •  ALPRAZolam (XANAX) 0.5 MG tablet, Take 1 tablet by mouth 3 (Three) Times a Day As Needed for Anxiety., Disp: 90 tablet, Rfl: 0  •  atorvastatin (LIPITOR) 20 MG tablet, TAKE 1 TABLET BY MOUTH ONE TIME A DAY , Disp: 30 tablet, Rfl: 4  •  butalbital-aspirin-caffeine-codeine (FIORINAL WITH CODEINE) -30-30 MG capsule, Take 1 capsule by mouth every 6 (six) hours as needed for headaches., Disp: 90 capsule, Rfl: 0  •  Calcium Carbonate-Vitamin D (CALCIUM + D PO), Take  by mouth., Disp: , Rfl:   •  Cholecalciferol (VITAMIN D-3) 1000 UNITS capsule,  Take  by mouth., Disp: , Rfl:   •  fluticasone (FLONASE) 50 MCG/ACT nasal spray, 2 sprays into each nostril Daily., Disp: 3 each, Rfl: 3  •  FLUZONE HIGH-DOSE 0.5 ML suspension prefilled syringe injection, ADM 0.5ML IM UTD, Disp: , Rfl: 0  •  gabapentin (NEURONTIN) 100 MG capsule, Take 1 capsule by mouth 3 (three) times a day., Disp: 270 capsule, Rfl: 3  •  meclizine (ANTIVERT) 25 MG tablet, Take 1 tablet by mouth 3 (Three) Times a Day As Needed for dizziness., Disp: 180 tablet, Rfl: 1  •  metoprolol succinate XL (TOPROL-XL) 50 MG 24 hr tablet, TAKE 1 TABLET DAILY, Disp: 90 tablet, Rfl: 2  •  montelukast (SINGULAIR) 10 MG tablet, TAKE 1 TABLET EVERY NIGHT, Disp: 90 tablet, Rfl: 3  •  Multiple Minerals-Vitamins (ADVANCED CALCIUM/D/MAGNESIUM) tablet, Take  by mouth., Disp: , Rfl:   •  ondansetron ODT (ZOFRAN ODT) 8 MG disintegrating tablet, Take 1 tablet by mouth Every 8 (Eight) Hours As Needed for nausea or vomiting., Disp: 15 tablet, Rfl: 0  •  pantoprazole (PROTONIX) 40 MG EC tablet, TAKE 1 TABLET DAILY, Disp: 90 tablet, Rfl: 3  •  promethazine-codeine (PHENERGAN with CODEINE) 6.25-10 MG/5ML syrup, Take 5 mL by mouth Every 4 (Four) Hours As Needed for Cough., Disp: 180 mL, Rfl: 1  •  SUMAtriptan (IMITREX) 100 MG tablet, TAKE 1 TABLET ONCE AS NEEDED FOR MIGRAINE FOR UP TO 1 DOSE, Disp: 27 tablet, Rfl: 3  •  triamcinolone (KENALOG) 0.1 % ointment, Apply  topically 2 (Two) Times a Day., Disp: 80 g, Rfl: 0  •  triamcinolone (KENALOG) 0.1 % paste, APPLY TID TO QID, Disp: , Rfl: 0  •  triamterene-hydrochlorothiazide (MAXZIDE-25) 37.5-25 MG per tablet, TAKE 1 TABLET DAILY, Disp: 90 tablet, Rfl: 3  •  vitamin E 400 UNIT capsule, Take 400 Units by mouth Daily., Disp: , Rfl:      Objective     History of Present Illness   The patient is here today for follow-up on hyperglycemia, hyperlipidemia, essential hypertension, chronic renal insufficiency, and chronic anxiety    Review of Systems   Constitutional:        Mild chronic  fatigue   HENT:        The patient has a long history of recurrent dizziness and has been evaluated for this.  The patient has frequent clear rhinorrhea and watery drainage from eyes.   Respiratory: Negative for wheezing.         The patient does get short of air with moderate exertion.  Occasional cough.   Cardiovascular: Negative for chest pain, palpitations and leg swelling.   Gastrointestinal: Negative for abdominal pain, blood in stool, constipation and diarrhea.   Genitourinary: Negative.    Musculoskeletal:        Chronic osteoarthritic aches and pains   Neurological:        Many year history of frequent migraine headaches   Hematological: Negative.    Psychiatric/Behavioral:        The patient does have some chronic anxiety and sleep disturbance.       Physical Exam   Constitutional: She is oriented to person, place, and time. She appears well-developed and well-nourished.   HENT:   Right Ear: External ear normal.   Left Ear: External ear normal.   Mouth/Throat: Oropharynx is clear and moist.   Clear rhinorrhea   Eyes: Pupils are equal, round, and reactive to light.   Neck:   Carotid pulses normal   Cardiovascular: Normal rate, regular rhythm and normal heart sounds.    Pulmonary/Chest: Effort normal and breath sounds normal. No respiratory distress. She has no wheezes. She has no rales.   Musculoskeletal:   Osteoarthritic changes in multiple joints   Neurological: She is alert and oriented to person, place, and time. Coordination normal.   Skin: Skin is warm and dry.   Psychiatric: She has a normal mood and affect.   Nursing note and vitals reviewed.      ASSESSMENT  #1 hyperglycemia          #2 hyperlipidemia        #3 chronic renal insufficiency         #4 recurrent vertigo          #5 migraine headaches         #6 mild essential hypertension    DISCUSSION/SUMMARY    the patient's vital signs are normal.  CMP shows an elevated fasting blood sugar of 117.  The patient admits that she is not watching her  intake of sweets and carbs very well.  She was instructed to go on a low sugar, low starch and low saturated fat diet.  The patient's serum creatinine is also elevated at 1.29 and she was encouraged to increase her fluid intake.  The patient does not use over-the-counter nonsteroidal anti-inflammatory drugs.  Total cholesterol is 109, triglycerides 236, HDL cholesterol 29 and LDL cholesterol 73.  The patient has chronic rhinorrhea and occasional cough.  She likes to keep some Phenergan with codeine cough syrup around so we will give her a prescription for 6 ounces of this medication to use when necessary.  The patient was given a prescription for her alprazolam that she uses fairly infrequently.      PLAN   recheck in 4 months with fasting CMP, lipid panel, HbA1c and vitamin D level  No Follow-up on file.

## 2017-11-16 ENCOUNTER — TELEPHONE (OUTPATIENT)
Dept: FAMILY MEDICINE CLINIC | Facility: CLINIC | Age: 82
End: 2017-11-16

## 2017-11-16 NOTE — TELEPHONE ENCOUNTER
rx sent to [facility] for Triamcilone oint 80g (that's the biggest size they have)    ----- Message from Candida Diamond MA sent at 11/16/2017 11:13 AM EST -----  Contact: PT  PT SAW DR [facility]ALLER AND WAS PRESCRIBED THIS RX SHE WOULD LIKE TRIAMCINOLONE ACETONIDE OINTMENT .1% WOULD LIKE 90 DAY SUPPLY       PHARMACY SHE IS USING IS [facility] - [address] [phone] [facility] [phone] [facility]      PT PHONE [phone]

## 2017-11-17 RX ORDER — ESOMEPRAZOLE MAGNESIUM 40 MG/1
40 CAPSULE, DELAYED RELEASE ORAL
Qty: 90 CAPSULE | Refills: 3 | Status: SHIPPED | OUTPATIENT
Start: 2017-11-17 | End: 2018-10-25 | Stop reason: SDUPTHER

## 2017-11-21 ENCOUNTER — HOSPITAL ENCOUNTER (OUTPATIENT)
Dept: MAMMOGRAPHY | Facility: HOSPITAL | Age: 82
Discharge: HOME | End: 2017-11-21
Admitting: NURSE PRACTITIONER

## 2017-11-21 DIAGNOSIS — Z12.31 VISIT FOR SCREENING MAMMOGRAM: ICD-10-CM

## 2017-11-21 PROCEDURE — G0202 SCR MAMMO BI INCL CAD: HCPCS

## 2018-01-12 RX ORDER — MONTELUKAST SODIUM 10 MG/1
TABLET ORAL
Qty: 90 TABLET | Refills: 3 | Status: SHIPPED | OUTPATIENT
Start: 2018-01-12 | End: 2019-02-01 | Stop reason: SDUPTHER

## 2018-03-12 DIAGNOSIS — I10 ESSENTIAL HYPERTENSION: Primary | ICD-10-CM

## 2018-03-12 DIAGNOSIS — R73.9 HYPERGLYCEMIA: ICD-10-CM

## 2018-03-12 DIAGNOSIS — E78.5 HYPERLIPIDEMIA, UNSPECIFIED HYPERLIPIDEMIA TYPE: ICD-10-CM

## 2018-03-12 DIAGNOSIS — E55.9 VITAMIN D DEFICIENCY: ICD-10-CM

## 2018-03-13 LAB
25(OH)D3+25(OH)D2 SERPL-MCNC: 58.2 NG/ML (ref 30–100)
ALBUMIN SERPL-MCNC: 4.6 G/DL (ref 3.5–5.2)
ALBUMIN/GLOB SERPL: 1.8 G/DL
ALP SERPL-CCNC: 70 U/L (ref 39–117)
ALT SERPL-CCNC: 10 U/L (ref 1–33)
AST SERPL-CCNC: 22 U/L (ref 1–32)
BILIRUB SERPL-MCNC: 1.1 MG/DL (ref 0.1–1.2)
BUN SERPL-MCNC: 22 MG/DL (ref 8–23)
BUN/CREAT SERPL: 19.8 (ref 7–25)
CALCIUM SERPL-MCNC: 10.3 MG/DL (ref 8.6–10.5)
CHLORIDE SERPL-SCNC: 97 MMOL/L (ref 98–107)
CHOLEST SERPL-MCNC: 176 MG/DL (ref 0–200)
CO2 SERPL-SCNC: 26.6 MMOL/L (ref 22–29)
CREAT SERPL-MCNC: 1.11 MG/DL (ref 0.57–1)
GFR SERPLBLD CREATININE-BSD FMLA CKD-EPI: 47 ML/MIN/1.73
GFR SERPLBLD CREATININE-BSD FMLA CKD-EPI: 57 ML/MIN/1.73
GLOBULIN SER CALC-MCNC: 2.6 GM/DL
GLUCOSE SERPL-MCNC: 108 MG/DL (ref 65–99)
HBA1C MFR BLD: 5.5 % (ref 4.8–5.6)
HDLC SERPL-MCNC: 51 MG/DL (ref 40–60)
LDLC SERPL CALC-MCNC: 87 MG/DL (ref 0–100)
LDLC/HDLC SERPL: 1.7 {RATIO}
POTASSIUM SERPL-SCNC: 4.2 MMOL/L (ref 3.5–5.2)
PROT SERPL-MCNC: 7.2 G/DL (ref 6–8.5)
SODIUM SERPL-SCNC: 137 MMOL/L (ref 136–145)
TRIGL SERPL-MCNC: 191 MG/DL (ref 0–150)
VLDLC SERPL CALC-MCNC: 38.2 MG/DL (ref 5–40)

## 2018-03-20 ENCOUNTER — OFFICE VISIT (OUTPATIENT)
Dept: FAMILY MEDICINE CLINIC | Facility: CLINIC | Age: 83
End: 2018-03-20

## 2018-03-20 VITALS
OXYGEN SATURATION: 97 % | SYSTOLIC BLOOD PRESSURE: 132 MMHG | WEIGHT: 134 LBS | TEMPERATURE: 98 F | HEART RATE: 62 BPM | DIASTOLIC BLOOD PRESSURE: 68 MMHG | BODY MASS INDEX: 22.88 KG/M2 | HEIGHT: 64 IN | RESPIRATION RATE: 16 BRPM

## 2018-03-20 DIAGNOSIS — M15.9 GENERALIZED OSTEOARTHRITIS OF MULTIPLE SITES: ICD-10-CM

## 2018-03-20 DIAGNOSIS — G43.709 CHRONIC MIGRAINE WITHOUT AURA WITHOUT STATUS MIGRAINOSUS, NOT INTRACTABLE: ICD-10-CM

## 2018-03-20 DIAGNOSIS — R73.9 HYPERGLYCEMIA: Primary | ICD-10-CM

## 2018-03-20 DIAGNOSIS — I10 BENIGN ESSENTIAL HYPERTENSION: ICD-10-CM

## 2018-03-20 DIAGNOSIS — E78.2 MIXED HYPERLIPIDEMIA: ICD-10-CM

## 2018-03-20 DIAGNOSIS — N28.9 RENAL INSUFFICIENCY: ICD-10-CM

## 2018-03-20 PROCEDURE — 99214 OFFICE O/P EST MOD 30 MIN: CPT

## 2018-03-20 RX ORDER — ESTRADIOL 0.1 MG/G
2 CREAM VAGINAL DAILY
COMMUNITY

## 2018-03-20 RX ORDER — ASPIRIN 81 MG/1
81 TABLET ORAL DAILY
COMMUNITY
End: 2022-07-15 | Stop reason: HOSPADM

## 2018-03-20 RX ORDER — CODEINE/BUTALBITAL/ASA/CAFFEIN 30-50-325
1 CAPSULE ORAL EVERY 6 HOURS PRN
Qty: 90 CAPSULE | Refills: 0 | Status: SHIPPED | OUTPATIENT
Start: 2018-03-20 | End: 2020-10-27

## 2018-03-20 NOTE — PROGRESS NOTES
Subjective   Sirisha Auguste is a 82 y.o. female. Patient is here today for   Chief Complaint   Patient presents with   • Hyperglycemia   • Hyperlipidemia   • Hypertension          Vitals:    03/20/18 0944   BP: 132/68   Pulse: 62   Resp: 16   Temp: 98 °F (36.7 °C)   SpO2: 97%     The following portions of the patient's history were reviewed and updated as appropriate: allergies, current medications, past family history, past medical history, past social history, past surgical history and problem list.    Past Medical History:   Diagnosis Date   • Arthritis    • Breast cyst    • Chronic kidney disease    • GERD (gastroesophageal reflux disease)    • Hyperglycemia    • Hyperlipidemia    • Hypertension    • Migraines    • Murmur    • RBBB       Allergies   Allergen Reactions   • Sulfa Antibiotics       Social History     Social History   • Marital status:      Spouse name: N/A   • Number of children: N/A   • Years of education: N/A     Occupational History   • Not on file.     Social History Main Topics   • Smoking status: Former Smoker   • Smokeless tobacco: Former User   • Alcohol use Not on file   • Drug use: No   • Sexual activity: Not on file     Other Topics Concern   • Not on file     Social History Narrative   • No narrative on file        Current Outpatient Prescriptions:   •  acetaminophen (TYLENOL) 650 MG 8 hr tablet, Take 650 mg by mouth every 8 (eight) hours as needed for mild pain (1-3)., Disp: , Rfl:   •  ALPRAZolam (XANAX) 0.5 MG tablet, Take 1 tablet by mouth 3 (Three) Times a Day As Needed for Anxiety., Disp: 90 tablet, Rfl: 0  •  aspirin 81 MG EC tablet, Take 81 mg by mouth Daily., Disp: , Rfl:   •  atorvastatin (LIPITOR) 20 MG tablet, TAKE 1 TABLET BY MOUTH ONE TIME A DAY , Disp: 30 tablet, Rfl: 4  •  Calcium Carbonate-Vitamin D (CALCIUM + D PO), Take  by mouth., Disp: , Rfl:   •  Cholecalciferol (VITAMIN D-3) 1000 UNITS capsule, Take  by mouth., Disp: , Rfl:   •  diclofenac (VOLTAREN) 1 %  gel gel, Apply 4 g topically 4 (Four) Times a Day As Needed., Disp: , Rfl:   •  esomeprazole (NEXIUM) 40 MG capsule, Take 1 capsule by mouth Every Morning Before Breakfast., Disp: 90 capsule, Rfl: 3  •  estradiol (ESTRACE) 0.1 MG/GM vaginal cream, Insert 2 g into the vagina Daily., Disp: , Rfl:   •  meclizine (ANTIVERT) 25 MG tablet, Take 1 tablet by mouth 3 (Three) Times a Day As Needed for dizziness., Disp: 180 tablet, Rfl: 1  •  metoprolol succinate XL (TOPROL-XL) 50 MG 24 hr tablet, TAKE 1 TABLET DAILY, Disp: 90 tablet, Rfl: 2  •  montelukast (SINGULAIR) 10 MG tablet, TAKE 1 TABLET EVERY NIGHT, Disp: 90 tablet, Rfl: 3  •  Multiple Minerals-Vitamins (ADVANCED CALCIUM/D/MAGNESIUM) tablet, Take  by mouth., Disp: , Rfl:   •  SUMAtriptan (IMITREX) 100 MG tablet, TAKE 1 TABLET ONCE AS NEEDED FOR MIGRAINE FOR UP TO 1 DOSE, Disp: 27 tablet, Rfl: 3  •  triamcinolone (KENALOG) 0.1 % ointment, Apply  topically 2 (Two) Times a Day., Disp: 80 g, Rfl: 0  •  triamterene-hydrochlorothiazide (MAXZIDE-25) 37.5-25 MG per tablet, TAKE 1 TABLET DAILY, Disp: 90 tablet, Rfl: 3  •  butalbital-aspirin-caffeine-codeine (FIORINAL WITH CODEINE) -15-30 MG capsule, Take 1 capsule by mouth Every 6 (Six) Hours As Needed for Headache., Disp: 90 capsule, Rfl: 0     Objective     History of Present Illness   The patient is here today for follow-up on mild hyperglycemia, essential hypertension, hyperlipidemia, mild renal insufficiency, vitamin D deficiency, frequent migraine headaches, osteoarthritis of multiple sites     Review of Systems   Constitutional: Negative for chills and fever.        Mild fatigue   HENT: Negative for sore throat.         The Patient complains of a dry mouth   Respiratory: Negative for shortness of breath and wheezing.         Mild, nonproductive occasional cough   Cardiovascular: Negative for chest pain, palpitations and leg swelling.   Gastrointestinal: Negative for blood in stool, constipation and diarrhea.    Genitourinary: Negative.    Musculoskeletal:        Mild to moderate osteoarthritic pain in multiple sites   Neurological: Negative for speech difficulty, weakness and numbness.        Many year history of migraine headaches   Hematological: Negative for adenopathy. Does not bruise/bleed easily.   Psychiatric/Behavioral:        Moderate chronic anxiety       Physical Exam   Constitutional: She is oriented to person, place, and time. She appears well-developed and well-nourished.   HENT:   Head: Normocephalic.   Right Ear: External ear normal.   Left Ear: External ear normal.   Mouth/Throat: Oropharynx is clear and moist.   Eyes: Pupils are equal, round, and reactive to light.   Neck: No thyromegaly present.   Carotid pulses normal   Cardiovascular: Normal rate and regular rhythm.    Grade 2/6 systolic murmur heard at the upper sternal border   Pulmonary/Chest: Effort normal and breath sounds normal. No respiratory distress. She has no wheezes. She has no rales.   Abdominal: Soft. Bowel sounds are normal. She exhibits no distension and no mass. There is no tenderness. There is no guarding.   Musculoskeletal:   Osteoarthritic changes in multiple joints   Neurological: She is alert and oriented to person, place, and time. No cranial nerve deficit. Coordination normal.   Skin: Skin is warm and dry.   Scattered faint mild erythematous and slightly scaly areas of rash on face, trunk and left breast   Psychiatric: She has a normal mood and affect.       ASSESSMENT  #1 borderline hyperglycemia                #2 essential hypertension               #3 hyperlipidemia                #4 osteoarthritis of multiple sites                #5 frequent migraine headaches                #6 vitamin D deficiency        #7 mild renal insufficiency    DISCUSSION/SUMMARY   The patient's vital signs are normal.  CMP shows mildly elevated fasting blood sugar of 108, mildly elevated serum creatinine at 1.11 and minimally decreased chloride  of 97.  Total cholesterol is 176, triglycerides 191, HDL cholesterol 51 and LDL cholesterol is 87.  Hemoglobin A1c is only 5.50%.  Vitamin D level was normal.  The patient seems to have eczema and I have given her samples of eucrisa cream to try to see if this would help.  The patient continues to have osteoarthritic aches and pains and I told her to to try to get by with just using Tylenol up to a maximum of 3 g per day.  The patient continues to have frequent migraine headaches and still uses sumatriptan and occasionally has to use Fiorinal No. 3 in addition.  The patient states that she thinks she has had both or at least one of the pneumonia vaccines but will check with her pharmacy about this.  She has had a Zostavax vaccine.  Mammogram was normal last November.    PLAN  Recheck in 6 months with fasting CMP and lipid panel  No Follow-up on file.

## 2018-03-21 RX ORDER — SUMATRIPTAN 100 MG/1
TABLET, FILM COATED ORAL
Qty: 27 TABLET | Refills: 3 | Status: SHIPPED | OUTPATIENT
Start: 2018-03-21 | End: 2018-11-25 | Stop reason: SDUPTHER

## 2018-03-27 RX ORDER — METOPROLOL SUCCINATE 50 MG/1
TABLET, EXTENDED RELEASE ORAL
Qty: 90 TABLET | Refills: 3 | Status: SHIPPED | OUTPATIENT
Start: 2018-03-27 | End: 2019-03-22 | Stop reason: SDUPTHER

## 2018-04-19 ENCOUNTER — TELEPHONE (OUTPATIENT)
Dept: FAMILY MEDICINE CLINIC | Facility: CLINIC | Age: 83
End: 2018-04-19

## 2018-04-19 RX ORDER — TACROLIMUS 1 MG/G
OINTMENT TOPICAL 2 TIMES DAILY
Qty: 100 G | Refills: 3 | Status: SHIPPED | OUTPATIENT
Start: 2018-04-19 | End: 2020-10-27

## 2018-05-22 RX ORDER — TRIAMTERENE AND HYDROCHLOROTHIAZIDE 37.5; 25 MG/1; MG/1
TABLET ORAL
Qty: 90 TABLET | Refills: 3 | Status: ON HOLD | OUTPATIENT
Start: 2018-05-22 | End: 2022-07-13

## 2018-05-31 ENCOUNTER — TELEPHONE (OUTPATIENT)
Dept: FAMILY MEDICINE CLINIC | Facility: CLINIC | Age: 83
End: 2018-05-31

## 2018-05-31 RX ORDER — EZETIMIBE AND SIMVASTATIN 10; 20 MG/1; MG/1
1 TABLET ORAL NIGHTLY
Qty: 90 TABLET | Refills: 3 | Status: SHIPPED | OUTPATIENT
Start: 2018-05-31 | End: 2018-12-04

## 2018-05-31 NOTE — TELEPHONE ENCOUNTER
Patient said she could get the generic vytorin 10/20 now pretty cheap and she would like to go back on it. She hasn't been taking the atorvastatin for a while now. I let her know I would send it in the [facility].    ----- Message from Hannah Cooney sent at 5/30/2018 12:29 PM EDT -----  REFILL ON EZOTIMIDE / SIMVASTATIN 10/20MG QTY: 90  (?)    PHARMACY [facility]

## 2018-07-19 RX ORDER — FLUTICASONE PROPIONATE 50 MCG
SPRAY, SUSPENSION (ML) NASAL
Qty: 48 G | Refills: 3 | Status: SHIPPED | OUTPATIENT
Start: 2018-07-19 | End: 2019-11-21

## 2018-08-06 ENCOUNTER — OFFICE VISIT (OUTPATIENT)
Dept: FAMILY MEDICINE CLINIC | Facility: CLINIC | Age: 83
End: 2018-08-06

## 2018-08-06 VITALS
HEART RATE: 68 BPM | WEIGHT: 134 LBS | DIASTOLIC BLOOD PRESSURE: 76 MMHG | BODY MASS INDEX: 22.88 KG/M2 | OXYGEN SATURATION: 98 % | HEIGHT: 64 IN | RESPIRATION RATE: 18 BRPM | SYSTOLIC BLOOD PRESSURE: 104 MMHG | TEMPERATURE: 97.6 F

## 2018-08-06 DIAGNOSIS — I51.7 SEVERE CONCENTRIC LEFT VENTRICULAR HYPERTROPHY: ICD-10-CM

## 2018-08-06 DIAGNOSIS — M62.81 MUSCLE WEAKNESS: ICD-10-CM

## 2018-08-06 DIAGNOSIS — R06.00 PAROXYSMAL DYSPNEA: ICD-10-CM

## 2018-08-06 DIAGNOSIS — R06.00 DYSPNEA, UNSPECIFIED TYPE: Primary | ICD-10-CM

## 2018-08-06 DIAGNOSIS — I10 BENIGN ESSENTIAL HYPERTENSION: ICD-10-CM

## 2018-08-06 DIAGNOSIS — R01.1 HEART MURMUR: ICD-10-CM

## 2018-08-06 DIAGNOSIS — R53.82 CHRONIC FATIGUE: ICD-10-CM

## 2018-08-06 DIAGNOSIS — E78.5 HYPERLIPIDEMIA, UNSPECIFIED HYPERLIPIDEMIA TYPE: ICD-10-CM

## 2018-08-06 DIAGNOSIS — I51.89 DIASTOLIC DYSFUNCTION: ICD-10-CM

## 2018-08-06 DIAGNOSIS — M15.9 GENERALIZED OSTEOARTHRITIS OF MULTIPLE SITES: ICD-10-CM

## 2018-08-06 DIAGNOSIS — R73.9 HYPERGLYCEMIA: ICD-10-CM

## 2018-08-06 DIAGNOSIS — I10 ESSENTIAL HYPERTENSION: ICD-10-CM

## 2018-08-06 PROCEDURE — 99214 OFFICE O/P EST MOD 30 MIN: CPT

## 2018-08-06 RX ORDER — PROMETHAZINE HYDROCHLORIDE AND CODEINE PHOSPHATE 6.25; 1 MG/5ML; MG/5ML
5 SYRUP ORAL EVERY 4 HOURS PRN
Qty: 180 ML | Refills: 0 | Status: SHIPPED | OUTPATIENT
Start: 2018-08-06 | End: 2019-01-15 | Stop reason: SDUPTHER

## 2018-08-06 NOTE — PROGRESS NOTES
Subjective   Sirisha Auguste is a 83 y.o. female. Patient is here today for   Chief Complaint   Patient presents with   • Fatigue          Vitals:    08/06/18 1422   BP: 104/76   Pulse: 68   Resp: 18   Temp: 97.6 °F (36.4 °C)   SpO2: 98%     The following portions of the patient's history were reviewed and updated as appropriate: allergies, current medications, past family history, past medical history, past social history, past surgical history and problem list.    Past Medical History:   Diagnosis Date   • Arthritis    • Breast cyst    • Chronic kidney disease    • GERD (gastroesophageal reflux disease)    • Hyperglycemia    • Hyperlipidemia    • Hypertension    • Migraines    • Murmur    • RBBB       Allergies   Allergen Reactions   • Sulfa Antibiotics       Social History     Social History   • Marital status:      Spouse name: N/A   • Number of children: N/A   • Years of education: N/A     Occupational History   • Not on file.     Social History Main Topics   • Smoking status: Former Smoker   • Smokeless tobacco: Former User   • Alcohol use Not on file   • Drug use: No   • Sexual activity: Not on file     Other Topics Concern   • Not on file     Social History Narrative   • No narrative on file        Current Outpatient Prescriptions:   •  acetaminophen (TYLENOL) 650 MG 8 hr tablet, Take 650 mg by mouth every 8 (eight) hours as needed for mild pain (1-3)., Disp: , Rfl:   •  ALPRAZolam (XANAX) 0.5 MG tablet, Take 1 tablet by mouth 3 (Three) Times a Day As Needed for Anxiety., Disp: 90 tablet, Rfl: 0  •  aspirin 81 MG EC tablet, Take 81 mg by mouth Daily., Disp: , Rfl:   •  butalbital-aspirin-caffeine-codeine (FIORINAL WITH CODEINE) -59-30 MG capsule, Take 1 capsule by mouth Every 6 (Six) Hours As Needed for Headache., Disp: 90 capsule, Rfl: 0  •  Calcium Carbonate-Vitamin D (CALCIUM + D PO), Take  by mouth., Disp: , Rfl:   •  Cholecalciferol (VITAMIN D-3) 1000 UNITS capsule, Take  by mouth., Disp:  , Rfl:   •  diclofenac (VOLTAREN) 1 % gel gel, Apply 4 g topically 4 (Four) Times a Day As Needed., Disp: , Rfl:   •  esomeprazole (NEXIUM) 40 MG capsule, Take 1 capsule by mouth Every Morning Before Breakfast., Disp: 90 capsule, Rfl: 3  •  estradiol (ESTRACE) 0.1 MG/GM vaginal cream, Insert 2 g into the vagina Daily., Disp: , Rfl:   •  ezetimibe-simvastatin (VYTORIN) 10-20 MG per tablet, Take 1 tablet by mouth Every Night., Disp: 90 tablet, Rfl: 3  •  fluticasone (FLONASE) 50 MCG/ACT nasal spray, USE 2 SPRAYS IN EACH NOSTRIL DAILY, Disp: 48 g, Rfl: 3  •  meclizine (ANTIVERT) 25 MG tablet, Take 1 tablet by mouth 3 (Three) Times a Day As Needed for dizziness., Disp: 180 tablet, Rfl: 1  •  metoprolol succinate XL (TOPROL-XL) 50 MG 24 hr tablet, TAKE 1 TABLET DAILY, Disp: 90 tablet, Rfl: 3  •  montelukast (SINGULAIR) 10 MG tablet, TAKE 1 TABLET EVERY NIGHT, Disp: 90 tablet, Rfl: 3  •  Multiple Minerals-Vitamins (ADVANCED CALCIUM/D/MAGNESIUM) tablet, Take  by mouth., Disp: , Rfl:   •  SUMAtriptan (IMITREX) 100 MG tablet, TAKE 1 TABLET ONCE AS NEEDED FOR MIGRAINE FOR UP TO 1 DOSE, Disp: 27 tablet, Rfl: 3  •  tacrolimus (PROTOPIC) 0.1 % ointment, Apply  topically 2 (Two) Times a Day., Disp: 100 g, Rfl: 3  •  triamcinolone (KENALOG) 0.1 % ointment, Apply  topically 2 (Two) Times a Day., Disp: 80 g, Rfl: 0  •  triamterene-hydrochlorothiazide (MAXZIDE-25) 37.5-25 MG per tablet, TAKE 1 TABLET DAILY, Disp: 90 tablet, Rfl: 3  •  promethazine-codeine (PHENERGAN with CODEINE) 6.25-10 MG/5ML syrup, Take 5 mL by mouth Every 4 (Four) Hours As Needed for Cough., Disp: 180 mL, Rfl: 0     Objective     History of Present Illness   The patient is here today for evaluation of moderately severe muscle weakness of arms and legs, fatigue, exertional dyspnea     Review of Systems   Constitutional: Positive for fatigue. Negative for chills, diaphoresis and fever.   HENT: Negative for ear pain, sinus pressure and sore throat.          Occasional clear rhinorrhea and watery eyes.   Respiratory:        The patient has occasional cough which is nonproductive.  The patient states that this usually occurs when she is having her upper respiratory allergy problems.  The patient does have moderate exertional dyspnea.  No wheezes.   Cardiovascular: Negative for chest pain, palpitations and leg swelling.   Gastrointestinal: Negative for abdominal pain, blood in stool, constipation and diarrhea.   Genitourinary: Negative.    Musculoskeletal:        Patient mainly complains of just a feeling of weakness in her arms and legs but minimal myalgia.  She also has mild to moderate osteoarthritic aches and pains.   Neurological: Negative for dizziness, speech difficulty and numbness.        The patient has a history of chronic migraine headaches as well as probable muscle contraction headaches.   Hematological: Negative for adenopathy. Does not bruise/bleed easily.   Psychiatric/Behavioral:        The patient has a long history of anxiety.  The patient states that she is somewhat more anxious recently and can't really explain why.       Physical Exam   Constitutional: She is oriented to person, place, and time. She appears well-developed and well-nourished.   Neck: No thyromegaly present.   Carotid pulses normal   Cardiovascular: Normal rate.    A few ectopic beats were heard during my examination   Pulmonary/Chest: Effort normal. No respiratory distress. She has no wheezes.   Very slightly decreased breath sounds in the bases.   Abdominal: Soft. Bowel sounds are normal. She exhibits no distension and no mass. There is no tenderness.   Musculoskeletal: She exhibits no edema.   Osteoarthritic changes in multiple joints.  No direct muscle tenderness was noted.   Neurological: She is alert and oriented to person, place, and time. No cranial nerve deficit. Coordination normal.   Skin: Skin is warm and dry.   Psychiatric: She has a normal mood and affect.   Nursing note  and vitals reviewed.      ASSESSMENT  #1 moderate general muscle weakness                 #2 fatigue                        #3 exertional dyspnea                  #4 essential hypertension                  #5 hyperglycemia                #6 hyperlipidemia            #7 severe left ventricular hypertrophy per echocardiogram                      #8 diastolic dysfunction     DISCUSSION/SUMMARY    Vital signs are normal.  The patient states that she, over the last few months has been having increasing muscle weakness especially in her arms and legs.  She also has exertional shortness of air.  The patient has a history of moderately severe left ventricular hypertrophy and diastolic dysfunction as well as some mild valve issues.  Her last echocardiogram was approximately 2-1/2 years ago.  The patient has a fairly frequent cough which is probably secondary to upper respiratory allergies.  Previous CT scanning of the lungs did not show any evidence of pulmonary fibrosis.  The patient was scheduled to come back and see me for her usual follow-up with labs next month.  We'll do labs today instead.  I'm going to order a CMP, lipid panel, free T4, TSH, and CBC as well as a CK.  I'm also going to order another echocardiogram to reassess her LVH and diastolic dysfunction.  If the patient's tests really don't give us a clue to her problem I am going to have her hold her statin drug just encase she is getting muscle weakness from that.  An EKG was done today which showed minimal sinus bradycardia and right bundle branch block only.  There was no change in EKG as compared to one done in 2015 when the patient had a treadmill stress test at [facility].  EKG was done because of the patient's symptoms of exertional dyspnea and fatigue.    PLAN  Today we will draw a CMP, lipid panel, free T4, TSH, CBC and CPK  No Follow-up on file.

## 2018-08-07 DIAGNOSIS — R06.09 EXERTIONAL DYSPNEA: Primary | ICD-10-CM

## 2018-08-07 DIAGNOSIS — I51.89 DIASTOLIC DYSFUNCTION: ICD-10-CM

## 2018-08-07 DIAGNOSIS — I51.7 SEVERE CONCENTRIC LEFT VENTRICULAR HYPERTROPHY: ICD-10-CM

## 2018-08-07 LAB
ALBUMIN SERPL-MCNC: 5.3 G/DL (ref 3.5–5.2)
ALBUMIN/GLOB SERPL: 2 G/DL
ALP SERPL-CCNC: 78 U/L (ref 39–117)
ALT SERPL-CCNC: 11 U/L (ref 1–33)
AST SERPL-CCNC: 21 U/L (ref 1–32)
BASOPHILS # BLD AUTO: 0.01 10*3/MM3 (ref 0–0.2)
BASOPHILS NFR BLD AUTO: 0.1 % (ref 0–1.5)
BILIRUB SERPL-MCNC: 1.2 MG/DL (ref 0.1–1.2)
BUN SERPL-MCNC: 22 MG/DL (ref 8–23)
BUN/CREAT SERPL: 19.1 (ref 7–25)
CALCIUM SERPL-MCNC: 10.6 MG/DL (ref 8.6–10.5)
CHLORIDE SERPL-SCNC: 91 MMOL/L (ref 98–107)
CHOLEST SERPL-MCNC: 200 MG/DL (ref 0–200)
CK SERPL-CCNC: 73 U/L (ref 20–180)
CO2 SERPL-SCNC: 27.2 MMOL/L (ref 22–29)
CREAT SERPL-MCNC: 1.15 MG/DL (ref 0.57–1)
EOSINOPHIL # BLD AUTO: 0.13 10*3/MM3 (ref 0–0.7)
EOSINOPHIL NFR BLD AUTO: 1.7 % (ref 0.3–6.2)
ERYTHROCYTE [DISTWIDTH] IN BLOOD BY AUTOMATED COUNT: 12.9 % (ref 11.7–13)
GLOBULIN SER CALC-MCNC: 2.7 GM/DL
GLUCOSE SERPL-MCNC: 99 MG/DL (ref 65–99)
HBA1C MFR BLD: 5.5 % (ref 4.8–5.6)
HCT VFR BLD AUTO: 43 % (ref 35.6–45.5)
HDLC SERPL-MCNC: 61 MG/DL (ref 40–60)
HGB BLD-MCNC: 14 G/DL (ref 11.9–15.5)
IMM GRANULOCYTES # BLD: 0 10*3/MM3 (ref 0–0.03)
IMM GRANULOCYTES NFR BLD: 0 % (ref 0–0.5)
LDLC SERPL CALC-MCNC: 106 MG/DL (ref 0–100)
LDLC/HDLC SERPL: 1.73 {RATIO}
LYMPHOCYTES # BLD AUTO: 1.91 10*3/MM3 (ref 0.9–4.8)
LYMPHOCYTES NFR BLD AUTO: 25.6 % (ref 19.6–45.3)
MCH RBC QN AUTO: 30.2 PG (ref 26.9–32)
MCHC RBC AUTO-ENTMCNC: 32.6 G/DL (ref 32.4–36.3)
MCV RBC AUTO: 92.9 FL (ref 80.5–98.2)
MONOCYTES # BLD AUTO: 0.49 10*3/MM3 (ref 0.2–1.2)
MONOCYTES NFR BLD AUTO: 6.6 % (ref 5–12)
NEUTROPHILS # BLD AUTO: 4.92 10*3/MM3 (ref 1.9–8.1)
NEUTROPHILS NFR BLD AUTO: 66 % (ref 42.7–76)
PLATELET # BLD AUTO: 218 10*3/MM3 (ref 140–500)
POTASSIUM SERPL-SCNC: 3.8 MMOL/L (ref 3.5–5.2)
PROT SERPL-MCNC: 8 G/DL (ref 6–8.5)
RBC # BLD AUTO: 4.63 10*6/MM3 (ref 3.9–5.2)
SODIUM SERPL-SCNC: 134 MMOL/L (ref 136–145)
T4 FREE SERPL-MCNC: 1.53 NG/DL (ref 0.93–1.7)
TRIGL SERPL-MCNC: 167 MG/DL (ref 0–150)
TSH SERPL DL<=0.005 MIU/L-ACNC: 1.54 MIU/ML (ref 0.27–4.2)
VLDLC SERPL CALC-MCNC: 33.4 MG/DL (ref 5–40)
WBC # BLD AUTO: 7.46 10*3/MM3 (ref 4.5–10.7)

## 2018-08-16 ENCOUNTER — HOSPITAL ENCOUNTER (OUTPATIENT)
Dept: CARDIOLOGY | Facility: HOSPITAL | Age: 83
Discharge: HOME | End: 2018-08-16

## 2018-08-16 VITALS
BODY MASS INDEX: 22.49 KG/M2 | DIASTOLIC BLOOD PRESSURE: 88 MMHG | HEART RATE: 64 BPM | SYSTOLIC BLOOD PRESSURE: 132 MMHG | WEIGHT: 135 LBS | HEIGHT: 65 IN

## 2018-08-16 DIAGNOSIS — R06.09 EXERTIONAL DYSPNEA: ICD-10-CM

## 2018-08-16 DIAGNOSIS — I51.7 SEVERE CONCENTRIC LEFT VENTRICULAR HYPERTROPHY: ICD-10-CM

## 2018-08-16 DIAGNOSIS — I51.89 DIASTOLIC DYSFUNCTION: ICD-10-CM

## 2018-08-16 LAB
ASCENDING AORTA: 3.4 CM
BH CV ECHO MEAS - ACS: 1.8 CM
BH CV ECHO MEAS - AO MAX PG (FULL): 1.7 MMHG
BH CV ECHO MEAS - AO MAX PG: 9.2 MMHG
BH CV ECHO MEAS - AO MEAN PG (FULL): 0.95 MMHG
BH CV ECHO MEAS - AO MEAN PG: 5.6 MMHG
BH CV ECHO MEAS - AO ROOT AREA (BSA CORRECTED): 2.2
BH CV ECHO MEAS - AO ROOT AREA: 10.2 CM^2
BH CV ECHO MEAS - AO ROOT DIAM: 3.6 CM
BH CV ECHO MEAS - AO V2 MAX: 152 CM/SEC
BH CV ECHO MEAS - AO V2 MEAN: 111.5 CM/SEC
BH CV ECHO MEAS - AO V2 VTI: 40.4 CM
BH CV ECHO MEAS - AVA(I,A): 2.1 CM^2
BH CV ECHO MEAS - AVA(I,D): 2.1 CM^2
BH CV ECHO MEAS - AVA(V,A): 2.2 CM^2
BH CV ECHO MEAS - AVA(V,D): 2.2 CM^2
BH CV ECHO MEAS - BSA(HAYCOCK): 1.7 M^2
BH CV ECHO MEAS - BSA: 1.7 M^2
BH CV ECHO MEAS - BZI_BMI: 22 KILOGRAMS/M^2
BH CV ECHO MEAS - BZI_METRIC_HEIGHT: 165.1 CM
BH CV ECHO MEAS - BZI_METRIC_WEIGHT: 59.9 KG
BH CV ECHO MEAS - EDV(MOD-SP2): 60 ML
BH CV ECHO MEAS - EDV(MOD-SP4): 66 ML
BH CV ECHO MEAS - EDV(TEICH): 167.7 ML
BH CV ECHO MEAS - EF(CUBED): 49.3 %
BH CV ECHO MEAS - EF(MOD-BP): 54 %
BH CV ECHO MEAS - EF(MOD-SP2): 56.7 %
BH CV ECHO MEAS - EF(MOD-SP4): 50 %
BH CV ECHO MEAS - EF(TEICH): 40.8 %
BH CV ECHO MEAS - ESV(MOD-SP2): 26 ML
BH CV ECHO MEAS - ESV(MOD-SP4): 33 ML
BH CV ECHO MEAS - ESV(TEICH): 99.2 ML
BH CV ECHO MEAS - FS: 20.3 %
BH CV ECHO MEAS - IVS/LVPW: 1.1
BH CV ECHO MEAS - IVSD: 1.3 CM
BH CV ECHO MEAS - LAT PEAK E' VEL: 15 CM/SEC
BH CV ECHO MEAS - LV DIASTOLIC VOL/BSA (35-75): 39.8 ML/M^2
BH CV ECHO MEAS - LV MASS(C)D: 305.4 GRAMS
BH CV ECHO MEAS - LV MASS(C)DI: 184.2 GRAMS/M^2
BH CV ECHO MEAS - LV MAX PG: 7.5 MMHG
BH CV ECHO MEAS - LV MEAN PG: 4.6 MMHG
BH CV ECHO MEAS - LV SYSTOLIC VOL/BSA (12-30): 19.9 ML/M^2
BH CV ECHO MEAS - LV V1 MAX: 137.1 CM/SEC
BH CV ECHO MEAS - LV V1 MEAN: 98.5 CM/SEC
BH CV ECHO MEAS - LV V1 VTI: 34.7 CM
BH CV ECHO MEAS - LVIDD: 5.8 CM
BH CV ECHO MEAS - LVIDS: 4.6 CM
BH CV ECHO MEAS - LVLD AP2: 7.2 CM
BH CV ECHO MEAS - LVLD AP4: 6.8 CM
BH CV ECHO MEAS - LVLS AP2: 6.4 CM
BH CV ECHO MEAS - LVLS AP4: 6 CM
BH CV ECHO MEAS - LVOT AREA (M): 2.5 CM^2
BH CV ECHO MEAS - LVOT AREA: 2.4 CM^2
BH CV ECHO MEAS - LVOT DIAM: 1.8 CM
BH CV ECHO MEAS - LVPWD: 1.2 CM
BH CV ECHO MEAS - MED PEAK E' VEL: 24 CM/SEC
BH CV ECHO MEAS - MR MAX PG: 62.4 MMHG
BH CV ECHO MEAS - MR MAX VEL: 394.9 CM/SEC
BH CV ECHO MEAS - MV A DUR: 0.13 SEC
BH CV ECHO MEAS - MV A MAX VEL: 134.2 CM/SEC
BH CV ECHO MEAS - MV DEC SLOPE: 219.6 CM/SEC^2
BH CV ECHO MEAS - MV DEC TIME: 0.31 SEC
BH CV ECHO MEAS - MV E MAX VEL: 95.6 CM/SEC
BH CV ECHO MEAS - MV E/A: 0.71
BH CV ECHO MEAS - MV MAX PG: 5.6 MMHG
BH CV ECHO MEAS - MV MEAN PG: 1.6 MMHG
BH CV ECHO MEAS - MV P1/2T MAX VEL: 95.2 CM/SEC
BH CV ECHO MEAS - MV P1/2T: 127 MSEC
BH CV ECHO MEAS - MV V2 MAX: 118.6 CM/SEC
BH CV ECHO MEAS - MV V2 MEAN: 53.3 CM/SEC
BH CV ECHO MEAS - MV V2 VTI: 45.1 CM
BH CV ECHO MEAS - MVA P1/2T LCG: 2.3 CM^2
BH CV ECHO MEAS - MVA(P1/2T): 1.7 CM^2
BH CV ECHO MEAS - MVA(VTI): 1.9 CM^2
BH CV ECHO MEAS - PA ACC TIME: 0.13 SEC
BH CV ECHO MEAS - PA MAX PG (FULL): 2.8 MMHG
BH CV ECHO MEAS - PA MAX PG: 4.4 MMHG
BH CV ECHO MEAS - PA PR(ACCEL): 18.8 MMHG
BH CV ECHO MEAS - PA V2 MAX: 104.5 CM/SEC
BH CV ECHO MEAS - PULM A REVS DUR: 0.15 SEC
BH CV ECHO MEAS - PULM A REVS VEL: 24.3 CM/SEC
BH CV ECHO MEAS - PULM DIAS VEL: 30.9 CM/SEC
BH CV ECHO MEAS - PULM S/D: 1.8
BH CV ECHO MEAS - PULM SYS VEL: 54.7 CM/SEC
BH CV ECHO MEAS - PVA(V,A): 1.7 CM^2
BH CV ECHO MEAS - PVA(V,D): 1.7 CM^2
BH CV ECHO MEAS - QP/QS: 0.52
BH CV ECHO MEAS - RAP SYSTOLE: 3 MMHG
BH CV ECHO MEAS - RV MAX PG: 1.5 MMHG
BH CV ECHO MEAS - RV MEAN PG: 0.9 MMHG
BH CV ECHO MEAS - RV V1 MAX: 62.1 CM/SEC
BH CV ECHO MEAS - RV V1 MEAN: 44.3 CM/SEC
BH CV ECHO MEAS - RV V1 VTI: 15.3 CM
BH CV ECHO MEAS - RVOT AREA: 2.8 CM^2
BH CV ECHO MEAS - RVOT DIAM: 1.9 CM
BH CV ECHO MEAS - RVSP: 26 MMHG
BH CV ECHO MEAS - SI(AO): 247.5 ML/M^2
BH CV ECHO MEAS - SI(CUBED): 58.6 ML/M^2
BH CV ECHO MEAS - SI(LVOT): 50.7 ML/M^2
BH CV ECHO MEAS - SI(MOD-SP2): 20.5 ML/M^2
BH CV ECHO MEAS - SI(MOD-SP4): 19.9 ML/M^2
BH CV ECHO MEAS - SI(TEICH): 41.3 ML/M^2
BH CV ECHO MEAS - SUP REN AO DIAM: 1.9 CM
BH CV ECHO MEAS - SV(AO): 410.4 ML
BH CV ECHO MEAS - SV(CUBED): 97.1 ML
BH CV ECHO MEAS - SV(LVOT): 84 ML
BH CV ECHO MEAS - SV(MOD-SP2): 34 ML
BH CV ECHO MEAS - SV(MOD-SP4): 33 ML
BH CV ECHO MEAS - SV(RVOT): 43.5 ML
BH CV ECHO MEAS - SV(TEICH): 68.5 ML
BH CV ECHO MEAS - TAPSE (>1.6): 1.5 CM2
BH CV ECHO MEAS - TR MAX VEL: 243.9 CM/SEC
BH CV ECHO MEASUREMENTS AVERAGE E/E' RATIO: 4.9
BH CV XLRA - RV BASE: 3.5 CM
BH CV XLRA - TDI S': 9 CM/SEC
LEFT ATRIUM VOLUME INDEX: 11 ML/M2
LV EF 2D ECHO EST: 54 %
MAXIMAL PREDICTED HEART RATE: 137 BPM
SINUS: 3.1 CM
STJ: 2.7 CM
STRESS TARGET HR: 116 BPM

## 2018-08-16 PROCEDURE — 93306 TTE W/DOPPLER COMPLETE: CPT

## 2018-08-16 PROCEDURE — 93306 TTE W/DOPPLER COMPLETE: CPT | Performed by: INTERNAL MEDICINE

## 2018-08-16 PROCEDURE — 25010000002 PERFLUTREN (DEFINITY) 8.476 MG IN SODIUM CHLORIDE 0.9 % 10 ML INJECTION

## 2018-08-16 RX ADMIN — PERFLUTREN 1.5 ML: 6.52 INJECTION, SUSPENSION INTRAVENOUS at 13:17

## 2018-08-17 ENCOUNTER — TELEPHONE (OUTPATIENT)
Dept: FAMILY MEDICINE CLINIC | Facility: CLINIC | Age: 83
End: 2018-08-17

## 2018-08-17 NOTE — TELEPHONE ENCOUNTER
Patient notified    ----- Message from Rafat Cline MD sent at 8/17/2018  1:09 PM EDT -----  Tell patient that her echocardiogram did not really show any worsening of problems from the previous one.  She has some very minor issues only that are the same as before.  As I had discussed with her I would like for her to hold her cholesterol medicine for several weeks to see if she feels better without it.

## 2018-08-23 ENCOUNTER — OFFICE VISIT (OUTPATIENT)
Dept: FAMILY MEDICINE CLINIC | Facility: CLINIC | Age: 83
End: 2018-08-23

## 2018-08-23 VITALS
HEART RATE: 89 BPM | WEIGHT: 135 LBS | HEIGHT: 65 IN | DIASTOLIC BLOOD PRESSURE: 76 MMHG | OXYGEN SATURATION: 97 % | BODY MASS INDEX: 22.49 KG/M2 | SYSTOLIC BLOOD PRESSURE: 118 MMHG | RESPIRATION RATE: 16 BRPM

## 2018-08-23 DIAGNOSIS — R53.82 CHRONIC FATIGUE: ICD-10-CM

## 2018-08-23 DIAGNOSIS — I51.7 MILD CONCENTRIC LEFT VENTRICULAR HYPERTROPHY (LVH): ICD-10-CM

## 2018-08-23 DIAGNOSIS — I51.89 DIASTOLIC DYSFUNCTION: ICD-10-CM

## 2018-08-23 DIAGNOSIS — R06.00 PAROXYSMAL DYSPNEA: ICD-10-CM

## 2018-08-23 DIAGNOSIS — N28.9 RENAL INSUFFICIENCY: ICD-10-CM

## 2018-08-23 DIAGNOSIS — M15.9 GENERALIZED OSTEOARTHRITIS OF MULTIPLE SITES: ICD-10-CM

## 2018-08-23 DIAGNOSIS — E78.2 MIXED HYPERLIPIDEMIA: ICD-10-CM

## 2018-08-23 DIAGNOSIS — I51.7 SEVERE CONCENTRIC LEFT VENTRICULAR HYPERTROPHY: ICD-10-CM

## 2018-08-23 DIAGNOSIS — R01.1 HEART MURMUR: ICD-10-CM

## 2018-08-23 DIAGNOSIS — I10 BENIGN ESSENTIAL HYPERTENSION: Primary | ICD-10-CM

## 2018-08-23 PROCEDURE — 99213 OFFICE O/P EST LOW 20 MIN: CPT

## 2018-08-23 NOTE — PROGRESS NOTES
Subjective   Sirisha Auguste is a 83 y.o. female. Patient is here today for   Chief Complaint   Patient presents with   • Fatigue   • Hyperglycemia   • Hyperlipidemia   • Hypertension          Vitals:    08/23/18 0816   BP: 118/76   Pulse: 89   Resp: 16   SpO2: 97%     The following portions of the patient's history were reviewed and updated as appropriate: allergies, current medications, past family history, past medical history, past social history, past surgical history and problem list.    Past Medical History:   Diagnosis Date   • Arthritis    • Breast cyst    • Chronic kidney disease    • GERD (gastroesophageal reflux disease)    • Hyperglycemia    • Hyperlipidemia    • Hypertension    • Migraines    • Murmur    • RBBB       Allergies   Allergen Reactions   • Sulfa Antibiotics       Social History     Social History   • Marital status:      Spouse name: N/A   • Number of children: N/A   • Years of education: N/A     Occupational History   • Not on file.     Social History Main Topics   • Smoking status: Former Smoker   • Smokeless tobacco: Former User   • Alcohol use Not on file   • Drug use: No   • Sexual activity: Not on file     Other Topics Concern   • Not on file     Social History Narrative   • No narrative on file        Current Outpatient Prescriptions:   •  acetaminophen (TYLENOL) 650 MG 8 hr tablet, Take 650 mg by mouth every 8 (eight) hours as needed for mild pain (1-3)., Disp: , Rfl:   •  ALPRAZolam (XANAX) 0.5 MG tablet, Take 1 tablet by mouth 3 (Three) Times a Day As Needed for Anxiety., Disp: 90 tablet, Rfl: 0  •  aspirin 81 MG EC tablet, Take 81 mg by mouth Daily., Disp: , Rfl:   •  butalbital-aspirin-caffeine-codeine (FIORINAL WITH CODEINE) -80-30 MG capsule, Take 1 capsule by mouth Every 6 (Six) Hours As Needed for Headache., Disp: 90 capsule, Rfl: 0  •  Calcium Carbonate-Vitamin D (CALCIUM + D PO), Take  by mouth., Disp: , Rfl:   •  Cholecalciferol (VITAMIN D-3) 1000 UNITS  capsule, Take  by mouth., Disp: , Rfl:   •  diclofenac (VOLTAREN) 1 % gel gel, Apply 4 g topically 4 (Four) Times a Day As Needed., Disp: , Rfl:   •  esomeprazole (NEXIUM) 40 MG capsule, Take 1 capsule by mouth Every Morning Before Breakfast., Disp: 90 capsule, Rfl: 3  •  estradiol (ESTRACE) 0.1 MG/GM vaginal cream, Insert 2 g into the vagina Daily., Disp: , Rfl:   •  fluticasone (FLONASE) 50 MCG/ACT nasal spray, USE 2 SPRAYS IN EACH NOSTRIL DAILY, Disp: 48 g, Rfl: 3  •  meclizine (ANTIVERT) 25 MG tablet, Take 1 tablet by mouth 3 (Three) Times a Day As Needed for dizziness., Disp: 180 tablet, Rfl: 1  •  metoprolol succinate XL (TOPROL-XL) 50 MG 24 hr tablet, TAKE 1 TABLET DAILY, Disp: 90 tablet, Rfl: 3  •  montelukast (SINGULAIR) 10 MG tablet, TAKE 1 TABLET EVERY NIGHT, Disp: 90 tablet, Rfl: 3  •  Multiple Minerals-Vitamins (ADVANCED CALCIUM/D/MAGNESIUM) tablet, Take  by mouth., Disp: , Rfl:   •  SUMAtriptan (IMITREX) 100 MG tablet, TAKE 1 TABLET ONCE AS NEEDED FOR MIGRAINE FOR UP TO 1 DOSE, Disp: 27 tablet, Rfl: 3  •  tacrolimus (PROTOPIC) 0.1 % ointment, Apply  topically 2 (Two) Times a Day., Disp: 100 g, Rfl: 3  •  triamcinolone (KENALOG) 0.1 % ointment, Apply  topically 2 (Two) Times a Day., Disp: 80 g, Rfl: 0  •  triamterene-hydrochlorothiazide (MAXZIDE-25) 37.5-25 MG per tablet, TAKE 1 TABLET DAILY, Disp: 90 tablet, Rfl: 3  •  ezetimibe-simvastatin (VYTORIN) 10-20 MG per tablet, Take 1 tablet by mouth Every Night., Disp: 90 tablet, Rfl: 3  •  promethazine-codeine (PHENERGAN with CODEINE) 6.25-10 MG/5ML syrup, Take 5 mL by mouth Every 4 (Four) Hours As Needed for Cough., Disp: 180 mL, Rfl: 0     Objective     History of Present Illness   The patient is here today for follow-up on essential hypertension, hyperlipidemia, paroxysmal dyspnea, chronic migraine headaches, history of borderline hyperglycemia and generalized fatigue    Review of Systems   Constitutional:        Chronic fatigue and moderate muscle  weakness   HENT: Negative.    Respiratory:        Frequent cough which is not typically productive of colored sputum.  No wheezing.  Paroxysmal exertional dyspnea.   Cardiovascular: Negative for chest pain and leg swelling.   Gastrointestinal: Negative for abdominal pain and blood in stool.        History of gastroesophageal reflux disease but her symptoms are currently controlled well with medication.   Genitourinary: Negative.    Musculoskeletal:        Chronic osteoarthritic aches and pains.   Neurological: Negative for numbness.        Many year history of migraine headaches   Hematological: Negative for adenopathy. Does not bruise/bleed easily.   Psychiatric/Behavioral:        Mild chronic anxiety       Physical Exam   Constitutional: She is oriented to person, place, and time. She appears well-developed and well-nourished.   Eyes: Pupils are equal, round, and reactive to light.   Neck:   Carotid Pulses normal   Cardiovascular: Normal rate and regular rhythm.    Grade 2/6 systolic murmur heard along sternal border and precordial area   Pulmonary/Chest: Effort normal and breath sounds normal. No respiratory distress. She has no wheezes. She has no rales.   Abdominal: Soft. Bowel sounds are normal.   Musculoskeletal: She exhibits no edema.   Osteoarthritic changes in multiple joints   Neurological: She is alert and oriented to person, place, and time.   Skin: Skin is warm and dry.   Psychiatric: She has a normal mood and affect.   Nursing note and vitals reviewed.      ASSESSMENT  #1 essential hypertension                 #2 hyperlipidemia              #3 history of borderline hyperglycemia                 #4 chronic fatigue                 #5 mild generalized weakness  #6 mild renal insufficiency  DISCUSSION/SUMMARY   Vital signs are normal.  CBC is normal.  On this visit the patient's fasting blood sugar is normal at 99 and her hemoglobin A1c is normal as well.  This has improved since last visit.  The  patient's sodium and chloride are mildly decreased.  I have already decreased the patient's thiazide diuretic to just one half tablet daily.  Total cholesterol is 200, triglycerides 167, HDL cholesterol 61 and LDL cholesterol is 106.  The patient has been instructed to hold her generic Vytorin to see if this is causing some of her generalized weakness and fatigue.  Free T4 and TSH levels were normal.  Creatine kinase level was normal.  Recent echocardiogram did not show any major valve problems but did show some mild concentric hypertrophy and mild diastolic dysfunction.    PLAN  Recheck in 3-4 months with fasting CMP and lipid panel  No Follow-up on file.

## 2018-10-25 RX ORDER — ESOMEPRAZOLE MAGNESIUM 40 MG/1
CAPSULE, DELAYED RELEASE ORAL
Qty: 90 CAPSULE | Refills: 3 | Status: SHIPPED | OUTPATIENT
Start: 2018-10-25 | End: 2019-08-27 | Stop reason: SDUPTHER

## 2018-11-16 DIAGNOSIS — I10 ESSENTIAL HYPERTENSION: Primary | ICD-10-CM

## 2018-11-16 DIAGNOSIS — E78.5 HYPERLIPIDEMIA, UNSPECIFIED HYPERLIPIDEMIA TYPE: ICD-10-CM

## 2018-11-20 LAB
ALBUMIN SERPL-MCNC: 4.7 G/DL (ref 3.5–5.2)
ALBUMIN/GLOB SERPL: 1.6 G/DL
ALP SERPL-CCNC: 70 U/L (ref 39–117)
ALT SERPL-CCNC: 10 U/L (ref 1–33)
AST SERPL-CCNC: 20 U/L (ref 1–32)
BILIRUB SERPL-MCNC: 0.9 MG/DL (ref 0.1–1.2)
BUN SERPL-MCNC: 24 MG/DL (ref 8–23)
BUN/CREAT SERPL: 24.7 (ref 7–25)
CALCIUM SERPL-MCNC: 11 MG/DL (ref 8.6–10.5)
CHLORIDE SERPL-SCNC: 99 MMOL/L (ref 98–107)
CHOLEST SERPL-MCNC: 298 MG/DL (ref 0–200)
CO2 SERPL-SCNC: 27.1 MMOL/L (ref 22–29)
CREAT SERPL-MCNC: 0.97 MG/DL (ref 0.57–1)
GLOBULIN SER CALC-MCNC: 3 GM/DL
GLUCOSE SERPL-MCNC: 108 MG/DL (ref 65–99)
HDLC SERPL-MCNC: 55 MG/DL (ref 40–60)
LDLC SERPL CALC-MCNC: 195 MG/DL (ref 0–100)
LDLC/HDLC SERPL: 3.54 {RATIO}
POTASSIUM SERPL-SCNC: 4.4 MMOL/L (ref 3.5–5.2)
PROT SERPL-MCNC: 7.7 G/DL (ref 6–8.5)
SODIUM SERPL-SCNC: 138 MMOL/L (ref 136–145)
TRIGL SERPL-MCNC: 241 MG/DL (ref 0–150)
VLDLC SERPL CALC-MCNC: 48.2 MG/DL (ref 5–40)

## 2018-11-26 RX ORDER — SUMATRIPTAN 100 MG/1
TABLET, FILM COATED ORAL
Qty: 27 TABLET | Refills: 3 | Status: SHIPPED | OUTPATIENT
Start: 2018-11-26 | End: 2019-06-04 | Stop reason: SDUPTHER

## 2018-12-04 ENCOUNTER — OFFICE VISIT (OUTPATIENT)
Dept: FAMILY MEDICINE CLINIC | Facility: CLINIC | Age: 83
End: 2018-12-04

## 2018-12-04 VITALS
WEIGHT: 134 LBS | RESPIRATION RATE: 16 BRPM | SYSTOLIC BLOOD PRESSURE: 108 MMHG | HEART RATE: 74 BPM | DIASTOLIC BLOOD PRESSURE: 66 MMHG | OXYGEN SATURATION: 97 % | HEIGHT: 65 IN | BODY MASS INDEX: 22.33 KG/M2 | TEMPERATURE: 97.4 F

## 2018-12-04 DIAGNOSIS — I10 BENIGN ESSENTIAL HYPERTENSION: Primary | ICD-10-CM

## 2018-12-04 DIAGNOSIS — E78.2 MIXED HYPERLIPIDEMIA: ICD-10-CM

## 2018-12-04 DIAGNOSIS — R42 RECURRENT VERTIGO: ICD-10-CM

## 2018-12-04 DIAGNOSIS — E83.52 HYPERCALCEMIA: ICD-10-CM

## 2018-12-04 DIAGNOSIS — R73.9 HYPERGLYCEMIA: ICD-10-CM

## 2018-12-04 DIAGNOSIS — N28.9 RENAL INSUFFICIENCY: ICD-10-CM

## 2018-12-04 DIAGNOSIS — M15.9 GENERALIZED OSTEOARTHRITIS OF MULTIPLE SITES: ICD-10-CM

## 2018-12-04 PROBLEM — R21 RASH: Status: RESOLVED | Noted: 2017-08-11 | Resolved: 2018-12-04

## 2018-12-04 PROCEDURE — 99213 OFFICE O/P EST LOW 20 MIN: CPT

## 2018-12-04 RX ORDER — ALPRAZOLAM 0.5 MG/1
0.5 TABLET ORAL 3 TIMES DAILY PRN
Qty: 90 TABLET | Refills: 0 | Status: SHIPPED | OUTPATIENT
Start: 2018-12-04 | End: 2019-11-22 | Stop reason: SDUPTHER

## 2018-12-04 NOTE — PROGRESS NOTES
Subjective   Sirisha Auguste is a 83 y.o. female. Patient is here today for   Chief Complaint   Patient presents with   • Hyperlipidemia   • Hypertension          Vitals:    12/04/18 0949   BP: 108/66   Pulse: 74   Resp: 16   Temp: 97.4 °F (36.3 °C)   SpO2: 97%     The following portions of the patient's history were reviewed and updated as appropriate: allergies, current medications, past family history, past medical history, past social history, past surgical history and problem list.    Past Medical History:   Diagnosis Date   • Arthritis    • Breast cyst    • Chronic kidney disease    • GERD (gastroesophageal reflux disease)    • Hyperglycemia    • Hyperlipidemia    • Hypertension    • Migraines    • Murmur    • RBBB       Allergies   Allergen Reactions   • Sulfa Antibiotics       Social History     Socioeconomic History   • Marital status:      Spouse name: Not on file   • Number of children: Not on file   • Years of education: Not on file   • Highest education level: Not on file   Social Needs   • Financial resource strain: Not on file   • Food insecurity - worry: Not on file   • Food insecurity - inability: Not on file   • Transportation needs - medical: Not on file   • Transportation needs - non-medical: Not on file   Occupational History   • Not on file   Tobacco Use   • Smoking status: Former Smoker   • Smokeless tobacco: Former User   Substance and Sexual Activity   • Alcohol use: Not on file   • Drug use: No   • Sexual activity: Not on file   Other Topics Concern   • Not on file   Social History Narrative   • Not on file        Current Outpatient Medications:   •  acetaminophen (TYLENOL) 650 MG 8 hr tablet, Take 650 mg by mouth every 8 (eight) hours as needed for mild pain (1-3)., Disp: , Rfl:   •  aspirin 81 MG EC tablet, Take 81 mg by mouth Daily., Disp: , Rfl:   •  butalbital-aspirin-caffeine-codeine (FIORINAL WITH CODEINE) -35-30 MG capsule, Take 1 capsule by mouth Every 6 (Six) Hours  As Needed for Headache., Disp: 90 capsule, Rfl: 0  •  Calcium Carbonate-Vitamin D (CALCIUM + D PO), Take  by mouth., Disp: , Rfl:   •  Cholecalciferol (VITAMIN D-3) 1000 UNITS capsule, Take  by mouth., Disp: , Rfl:   •  diclofenac (VOLTAREN) 1 % gel gel, Apply 4 g topically 4 (Four) Times a Day As Needed., Disp: , Rfl:   •  esomeprazole (nexIUM) 40 MG capsule, TAKE 1 CAPSULE EVERY MORNING BEFORE BREAKFAST, Disp: 90 capsule, Rfl: 3  •  estradiol (ESTRACE) 0.1 MG/GM vaginal cream, Insert 2 g into the vagina Daily., Disp: , Rfl:   •  fluticasone (FLONASE) 50 MCG/ACT nasal spray, USE 2 SPRAYS IN EACH NOSTRIL DAILY, Disp: 48 g, Rfl: 3  •  meclizine (ANTIVERT) 25 MG tablet, Take 1 tablet by mouth 3 (Three) Times a Day As Needed for dizziness., Disp: 180 tablet, Rfl: 1  •  metoprolol succinate XL (TOPROL-XL) 50 MG 24 hr tablet, TAKE 1 TABLET DAILY, Disp: 90 tablet, Rfl: 3  •  montelukast (SINGULAIR) 10 MG tablet, TAKE 1 TABLET EVERY NIGHT, Disp: 90 tablet, Rfl: 3  •  Multiple Minerals-Vitamins (ADVANCED CALCIUM/D/MAGNESIUM) tablet, Take  by mouth., Disp: , Rfl:   •  promethazine-codeine (PHENERGAN with CODEINE) 6.25-10 MG/5ML syrup, Take 5 mL by mouth Every 4 (Four) Hours As Needed for Cough., Disp: 180 mL, Rfl: 0  •  SUMAtriptan (IMITREX) 100 MG tablet, TAKE 1 TABLET ONCE AS NEEDED FOR MIGRAINE FOR UP TO 1 DOSE, Disp: 27 tablet, Rfl: 3  •  tacrolimus (PROTOPIC) 0.1 % ointment, Apply  topically 2 (Two) Times a Day., Disp: 100 g, Rfl: 3  •  triamcinolone (KENALOG) 0.1 % ointment, Apply  topically 2 (Two) Times a Day., Disp: 80 g, Rfl: 0  •  triamterene-hydrochlorothiazide (MAXZIDE-25) 37.5-25 MG per tablet, TAKE 1 TABLET DAILY (Patient taking differently: TAKE 1/2 TABLET DAILY), Disp: 90 tablet, Rfl: 3  •  ALPRAZolam (XANAX) 0.5 MG tablet, Take 1 tablet by mouth 3 (Three) Times a Day As Needed for Anxiety., Disp: 90 tablet, Rfl: 0     Objective     History of Present Illness   The patient is here today for follow-up on  essential hypertension, hyperlipidemia, osteoarthritis of multiple sites, and recurrent vertigo    Review of Systems   Constitutional: Negative for appetite change, chills and fever.        Mild chronic fatigue.  Age-related weakness.  The patient does not exercise on a regular basis.   HENT:        The patient has a long history of recurrent vertigo and has seen in the throat specialist for this problem.  The patient had an episode of fairly severe vertigo which lasted several days recently.  The patient complains of clear rhinorrhea but does not seem to respond to antihistamines.  The patient complains of dry mouth at night.   Respiratory: Negative for cough, shortness of breath and wheezing.    Cardiovascular: Negative for chest pain, palpitations and leg swelling.   Gastrointestinal: Negative for abdominal pain, blood in stool, constipation and diarrhea.   Genitourinary: Negative.    Musculoskeletal:        The patient has have osteoarthritic pain in multiple sites.  The patient does also have some muscle soreness which has not improved since she stopped her generic Vytorin.   Neurological:        Many year history of frequent migraine headaches.   Hematological: Negative.    Psychiatric/Behavioral:        Chronic anxiety.       Physical Exam   Constitutional: She is oriented to person, place, and time. She appears well-developed and well-nourished.   HENT:   Right Ear: External ear normal.   Left Ear: External ear normal.   Mouth/Throat: Oropharynx is clear and moist.   Neck: No thyromegaly present.   Carotid pulses normal   Cardiovascular: Normal rate, regular rhythm and normal heart sounds.   Pulmonary/Chest: Effort normal and breath sounds normal. No respiratory distress. She has no wheezes. She has no rales.   Abdominal: Soft. Bowel sounds are normal. She exhibits no distension. There is no tenderness. There is no guarding.   Musculoskeletal:   Osteoarthritic changes in multiple joints.   Lymphadenopathy:      She has no cervical adenopathy.   Neurological: She is alert and oriented to person, place, and time.   Skin: Skin is warm and dry.   Psychiatric: She has a normal mood and affect.   Nursing note and vitals reviewed.      ASSESSMENT  #1 essential hypertension                           #3 mixed hyperlipidemia                  #4 osteoarthritis of multiple sites                 #5 recurrent vertigo                  #6 borderline hyperglycemia   #7 minimal renal insufficiency                 #8 Hypercalcemia    DISCUSSION/SUMMARY   The patient's blood pressure is only 108/66 and I have instructed her to discontinue her half tablet of generic Maxzide.  With fasting CMP showed an elevated fasting blood sugar 108.  She has very slightly elevated BUN of 24.  Her serum creatinine has improved and is now normal at 0.97.  The  patient's estimated GFR is 55.  The patient's serum calcium level was elevated at 11.0.  The patient has chronic arthritic pain and also some muscle pain.  We stopped her generic Vytorin on her last visit but the muscle soreness did not improve.  She will restart her Vytorin because her total cholesterol has gone up to 298 and her triglycerides have also gone up to 241.  The patient's HDL cholesterol is 55 and her calculated LDL is 195.  I suggested to the patient that she contact physical therapy about doing exercises for chronic recurrent vertigo as well as muscle strengthening exercises.    PLAN  Recheck in 4-6 months with fasting CMP, lipid panel and parathyroid hormone level   No Follow-up on file.

## 2019-01-15 RX ORDER — PROMETHAZINE HYDROCHLORIDE AND CODEINE PHOSPHATE 6.25; 1 MG/5ML; MG/5ML
5 SYRUP ORAL EVERY 4 HOURS PRN
Qty: 180 ML | Refills: 0 | Status: SHIPPED | OUTPATIENT
Start: 2019-01-15 | End: 2019-06-03

## 2019-02-01 RX ORDER — MONTELUKAST SODIUM 10 MG/1
TABLET ORAL
Qty: 90 TABLET | Refills: 3 | Status: SHIPPED | OUTPATIENT
Start: 2019-02-01 | End: 2019-09-13 | Stop reason: SDUPTHER

## 2019-03-22 RX ORDER — METOPROLOL SUCCINATE 50 MG/1
TABLET, EXTENDED RELEASE ORAL
Qty: 90 TABLET | Refills: 3 | Status: SHIPPED | OUTPATIENT
Start: 2019-03-22 | End: 2019-08-14 | Stop reason: SDUPTHER

## 2019-05-14 RX ORDER — EZETIMIBE AND SIMVASTATIN 10; 20 MG/1; MG/1
TABLET ORAL
Qty: 90 TABLET | Refills: 3 | OUTPATIENT
Start: 2019-05-14

## 2019-05-20 ENCOUNTER — TELEPHONE (OUTPATIENT)
Dept: FAMILY MEDICINE CLINIC | Facility: CLINIC | Age: 84
End: 2019-05-20

## 2019-05-20 RX ORDER — EZETIMIBE AND SIMVASTATIN 10; 20 MG/1; MG/1
1 TABLET ORAL NIGHTLY
Qty: 90 TABLET | Refills: 1 | Status: SHIPPED | OUTPATIENT
Start: 2019-05-20 | End: 2019-08-14 | Stop reason: SDUPTHER

## 2019-05-20 NOTE — TELEPHONE ENCOUNTER
PER DR. RAINEY'S LAST OFFICE VISIT, PATIENT WAS TO RESTART THE VYTORIN. RX HAS BEEN SENT TO [facility] FOR PATIENT. EVANGELINA.     Regarding: Prescription Question  Contact: [phone]  ----- Message from [employer], Generic sent at 5/17/2019 12:50 PM EDT -----    Vytorin says denied ?? (On your portal and [facility] )

## 2019-05-21 DIAGNOSIS — E78.2 MIXED HYPERLIPIDEMIA: ICD-10-CM

## 2019-05-21 DIAGNOSIS — R73.9 HYPERGLYCEMIA: ICD-10-CM

## 2019-05-24 LAB
ALBUMIN SERPL-MCNC: 4.3 G/DL (ref 3.5–5.2)
ALBUMIN/GLOB SERPL: 1.4 G/DL
ALP SERPL-CCNC: 71 U/L (ref 39–117)
ALT SERPL-CCNC: 12 U/L (ref 1–33)
AST SERPL-CCNC: 22 U/L (ref 1–32)
BILIRUB SERPL-MCNC: 0.9 MG/DL (ref 0.2–1.2)
BUN SERPL-MCNC: 19 MG/DL (ref 8–23)
BUN/CREAT SERPL: 20.2 (ref 7–25)
CALCIUM SERPL-MCNC: 11.4 MG/DL (ref 8.6–10.5)
CHLORIDE SERPL-SCNC: 97 MMOL/L (ref 98–107)
CHOLEST SERPL-MCNC: 190 MG/DL (ref 0–200)
CO2 SERPL-SCNC: 29.3 MMOL/L (ref 22–29)
CREAT SERPL-MCNC: 0.94 MG/DL (ref 0.57–1)
GLOBULIN SER CALC-MCNC: 3 GM/DL
GLUCOSE SERPL-MCNC: 103 MG/DL (ref 65–99)
HDLC SERPL-MCNC: 55 MG/DL (ref 40–60)
LDLC SERPL CALC-MCNC: 97 MG/DL (ref 0–100)
LDLC/HDLC SERPL: 1.77 {RATIO}
POTASSIUM SERPL-SCNC: 4.7 MMOL/L (ref 3.5–5.2)
PROT SERPL-MCNC: 7.3 G/DL (ref 6–8.5)
PTH-INTACT SERPL-MCNC: 38 PG/ML (ref 15–65)
SODIUM SERPL-SCNC: 138 MMOL/L (ref 136–145)
TRIGL SERPL-MCNC: 188 MG/DL (ref 0–150)
VLDLC SERPL CALC-MCNC: 37.6 MG/DL

## 2019-06-03 ENCOUNTER — OFFICE VISIT (OUTPATIENT)
Dept: FAMILY MEDICINE CLINIC | Facility: CLINIC | Age: 84
End: 2019-06-03

## 2019-06-03 VITALS
RESPIRATION RATE: 16 BRPM | SYSTOLIC BLOOD PRESSURE: 112 MMHG | OXYGEN SATURATION: 97 % | TEMPERATURE: 97.8 F | HEART RATE: 68 BPM | BODY MASS INDEX: 23.08 KG/M2 | HEIGHT: 64 IN | WEIGHT: 135.2 LBS | DIASTOLIC BLOOD PRESSURE: 74 MMHG

## 2019-06-03 DIAGNOSIS — E78.2 MIXED HYPERLIPIDEMIA: ICD-10-CM

## 2019-06-03 DIAGNOSIS — T14.8XXA HEMATOMA: ICD-10-CM

## 2019-06-03 DIAGNOSIS — E83.52 HYPERCALCEMIA: ICD-10-CM

## 2019-06-03 DIAGNOSIS — R73.9 BORDERLINE HYPERGLYCEMIA: ICD-10-CM

## 2019-06-03 DIAGNOSIS — M15.9 GENERALIZED OSTEOARTHRITIS OF MULTIPLE SITES: ICD-10-CM

## 2019-06-03 DIAGNOSIS — I10 BENIGN ESSENTIAL HYPERTENSION: Primary | ICD-10-CM

## 2019-06-03 PROCEDURE — 99213 OFFICE O/P EST LOW 20 MIN: CPT

## 2019-06-03 NOTE — PROGRESS NOTES
Subjective   Sirisha Auguste is a 83 y.o. female. Patient is here today for   Chief Complaint   Patient presents with   • Hypertension     HYPERLIPIDEMIA- FOLLOW UP LABS          Vitals:    06/03/19 1045   BP: 112/74   Pulse: 68   Resp: 16   Temp: 97.8 °F (36.6 °C)   SpO2: 97%     The following portions of the patient's history were reviewed and updated as appropriate: allergies, current medications, past family history, past medical history, past social history, past surgical history and problem list.    Past Medical History:   Diagnosis Date   • Arthritis    • Breast cyst    • Chronic kidney disease    • GERD (gastroesophageal reflux disease)    • Hyperglycemia    • Hyperlipidemia    • Hypertension    • Migraines    • Murmur    • RBBB       Allergies   Allergen Reactions   • Sulfa Antibiotics       Social History     Socioeconomic History   • Marital status:      Spouse name: Not on file   • Number of children: Not on file   • Years of education: Not on file   • Highest education level: Not on file   Tobacco Use   • Smoking status: Former Smoker   • Smokeless tobacco: Former User   Substance and Sexual Activity   • Drug use: No        Current Outpatient Medications:   •  acetaminophen (TYLENOL) 650 MG 8 hr tablet, Take 650 mg by mouth every 8 (eight) hours as needed for mild pain (1-3)., Disp: , Rfl:   •  ALPRAZolam (XANAX) 0.5 MG tablet, Take 1 tablet by mouth 3 (Three) Times a Day As Needed for Anxiety., Disp: 90 tablet, Rfl: 0  •  aspirin 81 MG EC tablet, Take 81 mg by mouth Daily., Disp: , Rfl:   •  butalbital-aspirin-caffeine-codeine (FIORINAL WITH CODEINE) -05-30 MG capsule, Take 1 capsule by mouth Every 6 (Six) Hours As Needed for Headache., Disp: 90 capsule, Rfl: 0  •  Calcium Carbonate-Vitamin D (CALCIUM + D PO), Take  by mouth., Disp: , Rfl:   •  Cholecalciferol (VITAMIN D-3) 1000 UNITS capsule, Take  by mouth., Disp: , Rfl:   •  diclofenac (VOLTAREN) 1 % gel gel, Apply 4 g topically 4  (Four) Times a Day As Needed., Disp: , Rfl:   •  esomeprazole (nexIUM) 40 MG capsule, TAKE 1 CAPSULE EVERY MORNING BEFORE BREAKFAST, Disp: 90 capsule, Rfl: 3  •  estradiol (ESTRACE) 0.1 MG/GM vaginal cream, Insert 2 g into the vagina Daily., Disp: , Rfl:   •  ezetimibe-simvastatin (VYTORIN) 10-20 MG per tablet, Take 1 tablet by mouth Every Night., Disp: 90 tablet, Rfl: 1  •  fluticasone (FLONASE) 50 MCG/ACT nasal spray, USE 2 SPRAYS IN EACH NOSTRIL DAILY, Disp: 48 g, Rfl: 3  •  meclizine (ANTIVERT) 25 MG tablet, Take 1 tablet by mouth 3 (Three) Times a Day As Needed for dizziness., Disp: 180 tablet, Rfl: 1  •  metoprolol succinate XL (TOPROL-XL) 50 MG 24 hr tablet, TAKE 1 TABLET DAILY, Disp: 90 tablet, Rfl: 3  •  montelukast (SINGULAIR) 10 MG tablet, TAKE 1 TABLET EVERY NIGHT, Disp: 90 tablet, Rfl: 3  •  Multiple Minerals-Vitamins (ADVANCED CALCIUM/D/MAGNESIUM) tablet, Take  by mouth., Disp: , Rfl:   •  SUMAtriptan (IMITREX) 100 MG tablet, TAKE 1 TABLET ONCE AS NEEDED FOR MIGRAINE FOR UP TO 1 DOSE, Disp: 27 tablet, Rfl: 3  •  tacrolimus (PROTOPIC) 0.1 % ointment, Apply  topically 2 (Two) Times a Day., Disp: 100 g, Rfl: 3  •  triamcinolone (KENALOG) 0.1 % ointment, Apply  topically 2 (Two) Times a Day., Disp: 80 g, Rfl: 0  •  triamterene-hydrochlorothiazide (MAXZIDE-25) 37.5-25 MG per tablet, TAKE 1 TABLET DAILY (Patient taking differently: TAKE 1/2 TABLET DAILY), Disp: 90 tablet, Rfl: 3     Objective     History of Present Illness   The patient is here today for follow-up on essential hypertension, hyperlipidemia, osteoarthritis of multiple sites, chronic episodic migraine headaches , hypercalcemia and borderline hyperglycemia     Review of Systems   Constitutional: Negative for chills, fever and unexpected weight change.        Mild fatigue   HENT:        The patient does have frequent clear rhinorrhea   Respiratory: Negative for cough and wheezing.         The patient states that she does get some shortness of air  with moderate exertion   Cardiovascular: Negative for chest pain, palpitations and leg swelling.   Gastrointestinal:        Mild constipation.  Patient does have some hemorrhoidal discomfort.   Genitourinary: Negative for difficulty urinating and dysuria.        Mild urinary frequency   Musculoskeletal:        The patient does have osteoarthritic pain in multiple joints including her back, knees, hips, shoulders and hands.  Patient did slip while exiting a swimming pool about 2 weeks ago hitting her left hip area.  There was an area of swelling over her left lateral hip area and the patient had subsequent bruising of her upper leg.   Neurological: Negative for speech difficulty and weakness.        Many year history of fairly frequent migraine headaches   Psychiatric/Behavioral:        Patient does have a moderate degree of anxiety which does also seem to cause her some problems with sleeping.       Physical Exam   Constitutional: She is oriented to person, place, and time. She appears well-developed and well-nourished.   Neck: No JVD present. No thyromegaly present.   Cardiovascular: Normal rate, regular rhythm and normal heart sounds.   Pulmonary/Chest: Effort normal and breath sounds normal. No respiratory distress. She has no wheezes. She has no rales.   Abdominal: Soft. Bowel sounds are normal. She exhibits no distension and no mass. There is no tenderness. There is no guarding.   Musculoskeletal: She exhibits no edema.   Osteoarthritic changes in multiple joints.  Tenderness over the left greater trochanteric area where there is a 2 or 3 inch in diameter hematoma present   Neurological: She is alert and oriented to person, place, and time.   Skin:   The patient had much bruising of her left upper leg and inner thigh after her fall but this has nearly completely resolved.  The patient does have a fairly large hematoma over the left lateral hip area however.  The skin is not hot or red over this area.  It is  mildly tender.   Psychiatric: She has a normal mood and affect.   Nursing note and vitals reviewed.      ASSESSMENT  #1 essential hypertension               # 2 hyperlipidemia                     #3 generalized osteoarthritis of multiple sites                #4 hematoma left lateral hip area                #5 hypercalcemia       DISCUSSION/SUMMARY   The patient's blood pressure is 112/74 and she states that at home that is often even lower than that.  I am going to have the patient discontinue her hydrochlorothiazide altogether.  CMP shows an elevated fasting blood sugar of 103 and elevated serum calcium level of 11.4.  PTH level was entirely normal at 38.  I asked the patient to discontinue her over-the-counter calcium tablets and to slightly decrease her intake of dairy products.    The patient tells me that she slipped and fell while exiting a swimming pool about 2 or so weeks ago.  The patient bumped her left lateral hip area and subsequently developed bruising of her entire left upper leg and even into the groin area.  The bruising has cleared nearly entirely.  Patient does have a hematoma in the left lateral hip area but the skin is not hot and it is only minimally tender.  I explained to the patient to watch for increase warmth and pain and if so she will let us know.  This hopefully will just very gradually dissipate over time.    PLAN  Recheck 6 months with fasting CMP, lipid panel, PTH and CBC  No Follow-up on file.

## 2019-06-04 ENCOUNTER — TELEPHONE (OUTPATIENT)
Dept: FAMILY MEDICINE CLINIC | Facility: CLINIC | Age: 84
End: 2019-06-04

## 2019-06-04 RX ORDER — SUMATRIPTAN 100 MG/1
100 TABLET, FILM COATED ORAL
Qty: 27 TABLET | Refills: 3 | Status: SHIPPED | OUTPATIENT
Start: 2019-06-04 | End: 2019-08-14 | Stop reason: SDUPTHER

## 2019-06-04 NOTE — TELEPHONE ENCOUNTER
[facility] Jesus.       ----- Message from Jelena Mendoza sent at 6/4/2019  9:24 AM EDT -----  PT NEEDS A REFILL ON SUMATRIPTAN 100MG PRN RX IS FOR #27 FOR 38 DAYS WOULD LIKE TO HAVE A 90 DAY RX AS IT IS CHEAPER FOR HER . SHE DOES GET 3 REFILLS.    [facility] IS HER [facility]    [phone]

## 2019-08-14 ENCOUNTER — TELEPHONE (OUTPATIENT)
Dept: FAMILY MEDICINE CLINIC | Facility: CLINIC | Age: 84
End: 2019-08-14

## 2019-08-14 RX ORDER — SUMATRIPTAN 100 MG/1
100 TABLET, FILM COATED ORAL
Qty: 27 TABLET | Refills: 3 | Status: SHIPPED | OUTPATIENT
Start: 2019-08-14 | End: 2020-01-22

## 2019-08-14 RX ORDER — METOPROLOL SUCCINATE 50 MG/1
50 TABLET, EXTENDED RELEASE ORAL DAILY
Qty: 90 TABLET | Refills: 1 | Status: SHIPPED | OUTPATIENT
Start: 2019-08-14 | End: 2020-01-20

## 2019-08-14 RX ORDER — EZETIMIBE AND SIMVASTATIN 10; 20 MG/1; MG/1
1 TABLET ORAL NIGHTLY
Qty: 90 TABLET | Refills: 1 | Status: SHIPPED | OUTPATIENT
Start: 2019-08-14 | End: 2020-01-22

## 2019-08-14 NOTE — TELEPHONE ENCOUNTER
SENT RX'S TO [facility] FOR PATIENT.       ----- Message from Yeimi Jacobson sent at 8/13/2019  8:44 AM EDT -----  Contact: [phone]  SHE HAD REFILLS ON THESE MEDS BUT PHARMACY WON'T FILL BECAUSE THEY WERE UNDER DR RAINEY    SUMRADHAIPIN 100 MG   EZETIMBE-SIMVATTIN 10/20 QHS  METOPROLOL SUCCINATE XL 1QD    [facility]    PLEASE LET HER KNOW IF  DR AUSTIN CAN REFILL FOR HER

## 2019-08-27 RX ORDER — ESOMEPRAZOLE MAGNESIUM 40 MG/1
40 CAPSULE, DELAYED RELEASE ORAL
Qty: 90 CAPSULE | Refills: 1 | Status: SHIPPED | OUTPATIENT
Start: 2019-08-27 | End: 2019-08-29 | Stop reason: SDUPTHER

## 2019-08-29 ENCOUNTER — TELEPHONE (OUTPATIENT)
Dept: FAMILY MEDICINE CLINIC | Facility: CLINIC | Age: 84
End: 2019-08-29

## 2019-08-29 RX ORDER — ESOMEPRAZOLE MAGNESIUM 40 MG/1
40 CAPSULE, DELAYED RELEASE ORAL
Qty: 90 CAPSULE | Refills: 1 | Status: SHIPPED | OUTPATIENT
Start: 2019-08-29 | End: 2019-08-29 | Stop reason: SDUPTHER

## 2019-08-29 RX ORDER — ESOMEPRAZOLE MAGNESIUM 40 MG/1
40 CAPSULE, DELAYED RELEASE ORAL
Qty: 90 CAPSULE | Refills: 1 | Status: SHIPPED | OUTPATIENT
Start: 2019-08-29 | End: 2019-09-13 | Stop reason: SDUPTHER

## 2019-08-29 NOTE — TELEPHONE ENCOUNTER
Sent to [facility]    ----- Message from Chloe Khalil sent at 8/29/2019  9:37 AM EDT -----  PT CALLED IN STATING PHARMACY DID NOT GET SCRIPT REFILL FOR esomeprazole (nexIUM) 40 MG PLEASE RESEND TO [facility]

## 2019-09-13 ENCOUNTER — TELEPHONE (OUTPATIENT)
Dept: FAMILY MEDICINE CLINIC | Facility: CLINIC | Age: 84
End: 2019-09-13

## 2019-09-13 RX ORDER — ESOMEPRAZOLE MAGNESIUM 40 MG/1
40 CAPSULE, DELAYED RELEASE ORAL
Qty: 90 CAPSULE | Refills: 1 | Status: SHIPPED | OUTPATIENT
Start: 2019-09-13 | End: 2019-09-13 | Stop reason: SDUPTHER

## 2019-09-13 RX ORDER — MONTELUKAST SODIUM 10 MG/1
10 TABLET ORAL NIGHTLY
Qty: 90 TABLET | Refills: 3 | Status: SHIPPED | OUTPATIENT
Start: 2019-09-13 | End: 2019-09-13 | Stop reason: SDUPTHER

## 2019-09-13 RX ORDER — MONTELUKAST SODIUM 10 MG/1
10 TABLET ORAL NIGHTLY
Qty: 90 TABLET | Refills: 3 | Status: SHIPPED | OUTPATIENT
Start: 2019-09-13 | End: 2020-08-31 | Stop reason: SDUPTHER

## 2019-09-13 RX ORDER — ESOMEPRAZOLE MAGNESIUM 40 MG/1
40 CAPSULE, DELAYED RELEASE ORAL
Qty: 90 CAPSULE | Refills: 1 | Status: SHIPPED | OUTPATIENT
Start: 2019-09-13 | End: 2020-04-30

## 2019-09-13 NOTE — TELEPHONE ENCOUNTER
COMPLETED    ----- Message from Jelena Kelly sent at 9/12/2019 11:18 AM EDT -----  PT NEEDS A REFILL ON HER CINGULAR 10MG 1 QD #90     [facility] IS HER [facility]. 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

## 2019-10-14 ENCOUNTER — TELEPHONE (OUTPATIENT)
Dept: FAMILY MEDICINE CLINIC | Facility: CLINIC | Age: 84
End: 2019-10-14

## 2019-11-07 DIAGNOSIS — E78.2 MIXED HYPERLIPIDEMIA: ICD-10-CM

## 2019-11-07 DIAGNOSIS — I10 BENIGN ESSENTIAL HYPERTENSION: Primary | ICD-10-CM

## 2019-11-07 DIAGNOSIS — R53.82 CHRONIC FATIGUE: ICD-10-CM

## 2019-11-15 LAB
ALBUMIN SERPL-MCNC: 4.9 G/DL (ref 3.5–4.7)
ALBUMIN/GLOB SERPL: 1.9 {RATIO} (ref 1.2–2.2)
ALP SERPL-CCNC: 79 IU/L (ref 39–117)
ALT SERPL-CCNC: 10 IU/L (ref 0–32)
AST SERPL-CCNC: 21 IU/L (ref 0–40)
BILIRUB SERPL-MCNC: 1 MG/DL (ref 0–1.2)
BUN SERPL-MCNC: 23 MG/DL (ref 8–27)
BUN/CREAT SERPL: 25 (ref 12–28)
CALCIUM SERPL-MCNC: 10.7 MG/DL (ref 8.7–10.3)
CHLORIDE SERPL-SCNC: 103 MMOL/L (ref 96–106)
CHOLEST SERPL-MCNC: 169 MG/DL (ref 100–199)
CO2 SERPL-SCNC: 22 MMOL/L (ref 20–29)
CREAT SERPL-MCNC: 0.93 MG/DL (ref 0.57–1)
GLOBULIN SER CALC-MCNC: 2.6 G/DL (ref 1.5–4.5)
GLUCOSE SERPL-MCNC: 97 MG/DL (ref 65–99)
HDLC SERPL-MCNC: 58 MG/DL
LDLC SERPL CALC-MCNC: 84 MG/DL (ref 0–99)
LDLC/HDLC SERPL: 1.4 RATIO (ref 0–3.2)
POTASSIUM SERPL-SCNC: 4.7 MMOL/L (ref 3.5–5.2)
PROT SERPL-MCNC: 7.5 G/DL (ref 6–8.5)
PTH-INTACT SERPL-MCNC: 35 PG/ML (ref 15–65)
SODIUM SERPL-SCNC: 141 MMOL/L (ref 134–144)
TRIGL SERPL-MCNC: 134 MG/DL (ref 0–149)
VLDLC SERPL CALC-MCNC: 27 MG/DL (ref 5–40)

## 2019-11-21 ENCOUNTER — OFFICE VISIT (OUTPATIENT)
Dept: FAMILY MEDICINE CLINIC | Facility: CLINIC | Age: 84
End: 2019-11-21

## 2019-11-21 VITALS
BODY MASS INDEX: 23.05 KG/M2 | TEMPERATURE: 97.6 F | SYSTOLIC BLOOD PRESSURE: 118 MMHG | RESPIRATION RATE: 18 BRPM | WEIGHT: 135 LBS | HEART RATE: 60 BPM | OXYGEN SATURATION: 99 % | HEIGHT: 64 IN | DIASTOLIC BLOOD PRESSURE: 68 MMHG

## 2019-11-21 DIAGNOSIS — F41.1 GENERALIZED ANXIETY DISORDER: ICD-10-CM

## 2019-11-21 DIAGNOSIS — E78.2 MIXED HYPERLIPIDEMIA: Primary | ICD-10-CM

## 2019-11-21 DIAGNOSIS — I10 BENIGN ESSENTIAL HYPERTENSION: ICD-10-CM

## 2019-11-21 PROCEDURE — 99214 OFFICE O/P EST MOD 30 MIN: CPT | Performed by: INTERNAL MEDICINE

## 2019-11-22 ENCOUNTER — PATIENT MESSAGE (OUTPATIENT)
Dept: FAMILY MEDICINE CLINIC | Facility: CLINIC | Age: 84
End: 2019-11-22

## 2019-11-22 RX ORDER — PROMETHAZINE HYDROCHLORIDE AND CODEINE PHOSPHATE 6.25; 1 MG/5ML; MG/5ML
5 SYRUP ORAL EVERY 4 HOURS PRN
Qty: 180 ML | Refills: 0 | Status: SHIPPED | OUTPATIENT
Start: 2019-11-22 | End: 2022-07-15 | Stop reason: HOSPADM

## 2019-11-22 RX ORDER — ALPRAZOLAM 0.5 MG/1
0.5 TABLET ORAL 3 TIMES DAILY PRN
Qty: 90 TABLET | Refills: 0 | Status: SHIPPED | OUTPATIENT
Start: 2019-11-22 | End: 2020-06-19 | Stop reason: SDUPTHER

## 2019-11-22 NOTE — TELEPHONE ENCOUNTER
From: Sirisha Auguste  To: Calos Patel MD  Sent: 11/22/2019 9:32 AM EST  Subject: Prescription Question    Dr. Patel and Melissa,  I forgot to get refills for my Xanax .05 1 x 3/day x 3 mos. as needed for anxiety and my cough syrup Promethazine w/cod syrup qty 6. as before. [facility] you are a [facility] Dr[facility]an[facility] for me. (Dr. Cline was my doctor) they are not honoring my previous prescriptions.     It was a pleasure meeting you both yesterday.

## 2019-12-01 PROBLEM — F41.1 GENERALIZED ANXIETY DISORDER: Status: ACTIVE | Noted: 2019-12-01

## 2019-12-01 NOTE — PROGRESS NOTES
Subjective   Sirisha Auguste is a 84 y.o. female. Patient is here today for   Chief Complaint   Patient presents with   • Hypertension          Vitals:    11/21/19 1307   BP: 118/68   Pulse: 60   Resp: 18   Temp: 97.6 °F (36.4 °C)   SpO2: 99%     Body mass index is 22.82 kg/m².      Past Medical History:   Diagnosis Date   • Arthritis    • Breast cyst    • Chronic kidney disease    • GERD (gastroesophageal reflux disease)    • Hyperglycemia    • Hyperlipidemia    • Hypertension    • Migraines    • Murmur    • RBBB       Allergies   Allergen Reactions   • Sulfa Antibiotics Other (See Comments)     unknown      Social History     Socioeconomic History   • Marital status:      Spouse name: Not on file   • Number of children: Not on file   • Years of education: Not on file   • Highest education level: Not on file   Tobacco Use   • Smoking status: Former Smoker   • Smokeless tobacco: Never Used   • Tobacco comment: When she was 18   Substance and Sexual Activity   • Drug use: No        Current Outpatient Medications:   •  acetaminophen (TYLENOL) 650 MG 8 hr tablet, Take 650 mg by mouth every 8 (eight) hours as needed for mild pain (1-3)., Disp: , Rfl:   •  aspirin 81 MG EC tablet, Take 81 mg by mouth Daily., Disp: , Rfl:   •  butalbital-aspirin-caffeine-codeine (FIORINAL WITH CODEINE) -11-30 MG capsule, Take 1 capsule by mouth Every 6 (Six) Hours As Needed for Headache., Disp: 90 capsule, Rfl: 0  •  Calcium Carbonate-Vitamin D (CALCIUM + D PO), Take  by mouth., Disp: , Rfl:   •  diclofenac (VOLTAREN) 1 % gel gel, Apply 4 g topically 4 (Four) Times a Day As Needed., Disp: , Rfl:   •  esomeprazole (nexIUM) 40 MG capsule, Take 1 capsule by mouth Every Morning Before Breakfast., Disp: 90 capsule, Rfl: 1  •  estradiol (ESTRACE) 0.1 MG/GM vaginal cream, Insert 2 g into the vagina Daily., Disp: , Rfl:   •  ezetimibe-simvastatin (VYTORIN) 10-20 MG per tablet, Take 1 tablet by mouth Every Night., Disp: 90 tablet,  Rfl: 1  •  meclizine (ANTIVERT) 25 MG tablet, Take 1 tablet by mouth 3 (Three) Times a Day As Needed for dizziness., Disp: 180 tablet, Rfl: 1  •  metoprolol succinate XL (TOPROL-XL) 50 MG 24 hr tablet, Take 1 tablet by mouth Daily., Disp: 90 tablet, Rfl: 1  •  montelukast (SINGULAIR) 10 MG tablet, Take 1 tablet by mouth Every Night., Disp: 90 tablet, Rfl: 3  •  Multiple Minerals-Vitamins (ADVANCED CALCIUM/D/MAGNESIUM) tablet, Take  by mouth., Disp: , Rfl:   •  SUMAtriptan (IMITREX) 100 MG tablet, Take 1 tablet by mouth Every 2 (Two) Hours As Needed for Migraine. Take one tablet at onset of headache. May repeat one time in 2 hrs prn, Disp: 27 tablet, Rfl: 3  •  tacrolimus (PROTOPIC) 0.1 % ointment, Apply  topically 2 (Two) Times a Day., Disp: 100 g, Rfl: 3  •  triamcinolone (KENALOG) 0.1 % ointment, Apply  topically 2 (Two) Times a Day., Disp: 80 g, Rfl: 0  •  triamterene-hydrochlorothiazide (MAXZIDE-25) 37.5-25 MG per tablet, TAKE 1 TABLET DAILY (Patient taking differently: TAKE 1/2 TABLET DAILY), Disp: 90 tablet, Rfl: 3  •  ALPRAZolam (XANAX) 0.5 MG tablet, Take 1 tablet by mouth 3 (Three) Times a Day As Needed for Anxiety., Disp: 90 tablet, Rfl: 0  •  promethazine-codeine (PHENERGAN with CODEINE) 6.25-10 MG/5ML syrup, Take 5 mL by mouth Every 4 (Four) Hours As Needed for Cough., Disp: 180 mL, Rfl: 0     Objective     This pleasant patient is here to follow-up on hyperglycemia, hypercholesterolemia, and hypertension.    She has no complaints.         Review of Systems   Constitutional: Negative.    HENT: Negative.    Respiratory: Negative.    Cardiovascular: Negative.    Musculoskeletal: Negative.    Psychiatric/Behavioral: Negative.        Physical Exam   Constitutional: She is oriented to person, place, and time. She appears well-developed and well-nourished.   Pleasant, neatly groomed, BMI 22.8.   HENT:   Head: Normocephalic and atraumatic.   Neck:   No carotid bruits.   Pulmonary/Chest: Effort normal and breath  sounds normal.   Neurological: She is alert and oriented to person, place, and time.   Psychiatric: She has a normal mood and affect. Her behavior is normal.   Nursing note and vitals reviewed.        Problem List Items Addressed This Visit        Cardiovascular and Mediastinum    Benign essential hypertension    Hyperlipidemia - Primary       Other    Generalized anxiety disorder            PLAN  Her hypertension is well controlled.    She has hyperlipidemia which is well controlled on Vytorin.    She should follow-up for a Medicare wellness visit once yearly.    She requested alprazolam which she takes as needed for generalized anxiety.  I refilled this for her today.  I cautioned her about the possibility of increased risk for falls and dependency.    I asked her to follow-up in about 6 months.  No Follow-up on file.

## 2020-01-20 RX ORDER — METOPROLOL SUCCINATE 50 MG/1
TABLET, EXTENDED RELEASE ORAL
Qty: 90 TABLET | Refills: 0 | Status: SHIPPED | OUTPATIENT
Start: 2020-01-20 | End: 2020-04-20

## 2020-01-22 RX ORDER — EZETIMIBE AND SIMVASTATIN 10; 20 MG/1; MG/1
TABLET ORAL
Qty: 90 TABLET | Refills: 0 | Status: SHIPPED | OUTPATIENT
Start: 2020-01-22 | End: 2020-05-11

## 2020-01-22 RX ORDER — SUMATRIPTAN 100 MG/1
TABLET, FILM COATED ORAL
Qty: 27 TABLET | Refills: 0 | Status: SHIPPED | OUTPATIENT
Start: 2020-01-22 | End: 2020-01-28 | Stop reason: SDUPTHER

## 2020-01-28 DIAGNOSIS — G43.709 CHRONIC MIGRAINE WITHOUT AURA WITHOUT STATUS MIGRAINOSUS, NOT INTRACTABLE: ICD-10-CM

## 2020-01-28 DIAGNOSIS — G43.709 CHRONIC MIGRAINE WITHOUT AURA WITHOUT STATUS MIGRAINOSUS, NOT INTRACTABLE: Primary | ICD-10-CM

## 2020-01-28 RX ORDER — SUMATRIPTAN 100 MG/1
TABLET, FILM COATED ORAL
Qty: 36 TABLET | Refills: 0 | Status: SHIPPED | OUTPATIENT
Start: 2020-01-28 | End: 2020-01-28 | Stop reason: SDUPTHER

## 2020-01-28 RX ORDER — SUMATRIPTAN 100 MG/1
TABLET, FILM COATED ORAL
Qty: 36 TABLET | Refills: 3 | Status: SHIPPED | OUTPATIENT
Start: 2020-01-28 | End: 2020-02-27 | Stop reason: SDUPTHER

## 2020-02-26 ENCOUNTER — TELEPHONE (OUTPATIENT)
Dept: FAMILY MEDICINE CLINIC | Facility: CLINIC | Age: 85
End: 2020-02-26

## 2020-02-26 NOTE — TELEPHONE ENCOUNTER
SUMAtriptan (IMITREX) 100 MG tablet [[id number]]     Order Details   Dose, Route, Frequency: As Directed    Dispense Quantity: 36 tablet Refills: 3 Fills remaining: --           Sig: Take one tablet at onset of headache. May repeat dose one time in 2 hours if headache not relieved.               ] [facility] - [address] [phone] [facility] [phone] FX [1      3refills,     Thanks levi

## 2020-02-27 DIAGNOSIS — G43.709 CHRONIC MIGRAINE WITHOUT AURA WITHOUT STATUS MIGRAINOSUS, NOT INTRACTABLE: ICD-10-CM

## 2020-02-27 RX ORDER — SUMATRIPTAN 100 MG/1
TABLET, FILM COATED ORAL
Qty: 36 TABLET | Refills: 3 | Status: SHIPPED | OUTPATIENT
Start: 2020-02-27 | End: 2020-05-26 | Stop reason: SDUPTHER

## 2020-04-20 RX ORDER — METOPROLOL SUCCINATE 50 MG/1
TABLET, EXTENDED RELEASE ORAL
Qty: 90 TABLET | Refills: 3 | Status: SHIPPED | OUTPATIENT
Start: 2020-04-20 | End: 2021-05-28

## 2020-04-30 RX ORDER — ESOMEPRAZOLE MAGNESIUM 40 MG/1
CAPSULE, DELAYED RELEASE ORAL
Qty: 90 CAPSULE | Refills: 3 | Status: SHIPPED | OUTPATIENT
Start: 2020-04-30 | End: 2021-04-26

## 2020-05-11 RX ORDER — EZETIMIBE AND SIMVASTATIN 10; 20 MG/1; MG/1
TABLET ORAL
Qty: 90 TABLET | Refills: 3 | Status: SHIPPED | OUTPATIENT
Start: 2020-05-11 | End: 2021-06-28

## 2020-05-19 DIAGNOSIS — I10 BENIGN ESSENTIAL HYPERTENSION: ICD-10-CM

## 2020-05-19 DIAGNOSIS — E78.2 MIXED HYPERLIPIDEMIA: Primary | ICD-10-CM

## 2020-05-21 ENCOUNTER — RESULTS ENCOUNTER (OUTPATIENT)
Dept: FAMILY MEDICINE CLINIC | Facility: CLINIC | Age: 85
End: 2020-05-21

## 2020-05-21 DIAGNOSIS — E78.2 MIXED HYPERLIPIDEMIA: ICD-10-CM

## 2020-05-21 DIAGNOSIS — I10 BENIGN ESSENTIAL HYPERTENSION: ICD-10-CM

## 2020-05-22 LAB
ALBUMIN SERPL-MCNC: 4.8 G/DL (ref 3.5–5.2)
ALBUMIN/GLOB SERPL: 1.7 G/DL
ALP SERPL-CCNC: 73 U/L (ref 39–117)
ALT SERPL-CCNC: 9 U/L (ref 1–33)
AST SERPL-CCNC: 21 U/L (ref 1–32)
BASOPHILS # BLD AUTO: 0.03 10*3/MM3 (ref 0–0.2)
BASOPHILS NFR BLD AUTO: 0.6 % (ref 0–1.5)
BILIRUB SERPL-MCNC: 1.2 MG/DL (ref 0.2–1.2)
BUN SERPL-MCNC: 22 MG/DL (ref 8–23)
BUN/CREAT SERPL: 26.8 (ref 7–25)
CALCIUM SERPL-MCNC: 10.9 MG/DL (ref 8.6–10.5)
CHLORIDE SERPL-SCNC: 103 MMOL/L (ref 98–107)
CHOLEST SERPL-MCNC: 145 MG/DL (ref 0–200)
CO2 SERPL-SCNC: 27 MMOL/L (ref 22–29)
CREAT SERPL-MCNC: 0.82 MG/DL (ref 0.57–1)
EOSINOPHIL # BLD AUTO: 0.17 10*3/MM3 (ref 0–0.4)
EOSINOPHIL NFR BLD AUTO: 3.1 % (ref 0.3–6.2)
ERYTHROCYTE [DISTWIDTH] IN BLOOD BY AUTOMATED COUNT: 12.6 % (ref 12.3–15.4)
GLOBULIN SER CALC-MCNC: 2.8 GM/DL
GLUCOSE SERPL-MCNC: 105 MG/DL (ref 65–99)
HCT VFR BLD AUTO: 40.9 % (ref 34–46.6)
HDLC SERPL-MCNC: 49 MG/DL (ref 40–60)
HGB BLD-MCNC: 13.9 G/DL (ref 12–15.9)
IMM GRANULOCYTES # BLD AUTO: 0.01 10*3/MM3 (ref 0–0.05)
IMM GRANULOCYTES NFR BLD AUTO: 0.2 % (ref 0–0.5)
LDLC SERPL CALC-MCNC: 65 MG/DL (ref 0–100)
LDLC/HDLC SERPL: 1.33 {RATIO}
LYMPHOCYTES # BLD AUTO: 1.59 10*3/MM3 (ref 0.7–3.1)
LYMPHOCYTES NFR BLD AUTO: 29.2 % (ref 19.6–45.3)
MCH RBC QN AUTO: 30.8 PG (ref 26.6–33)
MCHC RBC AUTO-ENTMCNC: 34 G/DL (ref 31.5–35.7)
MCV RBC AUTO: 90.5 FL (ref 79–97)
MONOCYTES # BLD AUTO: 0.46 10*3/MM3 (ref 0.1–0.9)
MONOCYTES NFR BLD AUTO: 8.4 % (ref 5–12)
NEUTROPHILS # BLD AUTO: 3.19 10*3/MM3 (ref 1.7–7)
NEUTROPHILS NFR BLD AUTO: 58.5 % (ref 42.7–76)
NRBC BLD AUTO-RTO: 0 /100 WBC (ref 0–0.2)
PLATELET # BLD AUTO: 189 10*3/MM3 (ref 140–450)
POTASSIUM SERPL-SCNC: 4.6 MMOL/L (ref 3.5–5.2)
PROT SERPL-MCNC: 7.6 G/DL (ref 6–8.5)
RBC # BLD AUTO: 4.52 10*6/MM3 (ref 3.77–5.28)
SODIUM SERPL-SCNC: 139 MMOL/L (ref 136–145)
TRIGL SERPL-MCNC: 155 MG/DL (ref 0–150)
VLDLC SERPL CALC-MCNC: 31 MG/DL (ref 5–40)
WBC # BLD AUTO: 5.45 10*3/MM3 (ref 3.4–10.8)

## 2020-05-26 ENCOUNTER — OFFICE VISIT (OUTPATIENT)
Dept: FAMILY MEDICINE CLINIC | Facility: CLINIC | Age: 85
End: 2020-05-26

## 2020-05-26 VITALS
HEIGHT: 64 IN | RESPIRATION RATE: 18 BRPM | TEMPERATURE: 97.3 F | HEART RATE: 63 BPM | BODY MASS INDEX: 23.12 KG/M2 | DIASTOLIC BLOOD PRESSURE: 78 MMHG | SYSTOLIC BLOOD PRESSURE: 144 MMHG | WEIGHT: 135.4 LBS | OXYGEN SATURATION: 95 %

## 2020-05-26 DIAGNOSIS — E78.2 MIXED HYPERLIPIDEMIA: ICD-10-CM

## 2020-05-26 DIAGNOSIS — I10 BENIGN ESSENTIAL HYPERTENSION: Primary | ICD-10-CM

## 2020-05-26 DIAGNOSIS — G43.709 CHRONIC MIGRAINE WITHOUT AURA WITHOUT STATUS MIGRAINOSUS, NOT INTRACTABLE: ICD-10-CM

## 2020-05-26 PROCEDURE — 99214 OFFICE O/P EST MOD 30 MIN: CPT | Performed by: INTERNAL MEDICINE

## 2020-05-26 RX ORDER — SUMATRIPTAN 100 MG/1
TABLET, FILM COATED ORAL
Qty: 90 TABLET | Refills: 3 | Status: SHIPPED | OUTPATIENT
Start: 2020-05-26 | End: 2021-05-28

## 2020-05-26 NOTE — PROGRESS NOTES
Subjective   Sirisha Auguste is a 84 y.o. female. Patient is here today for   Chief Complaint   Patient presents with   • Follow-up     labs- anxiety, hyperlipidemia, migraines, and hypertension          Vitals:    05/26/20 0930   BP: 144/78   Pulse: 63   Resp: 18   Temp: 97.3 °F (36.3 °C)   SpO2: 95%     Body mass index is 22.89 kg/m².      Past Medical History:   Diagnosis Date   • Arthritis    • Breast cyst    • GERD (gastroesophageal reflux disease)    • Hyperglycemia    • Hyperlipidemia    • Hypertension    • Migraines    • Murmur    • RBBB       Allergies   Allergen Reactions   • Sulfa Antibiotics Other (See Comments)     unknown      Social History     Socioeconomic History   • Marital status:      Spouse name: Not on file   • Number of children: Not on file   • Years of education: Not on file   • Highest education level: Not on file   Tobacco Use   • Smoking status: Former Smoker   • Smokeless tobacco: Never Used   • Tobacco comment: When she was 18   Substance and Sexual Activity   • Alcohol use: Never     Frequency: Never   • Drug use: No   • Sexual activity: Not Currently        Current Outpatient Medications:   •  acetaminophen (TYLENOL) 650 MG 8 hr tablet, Take 650 mg by mouth every 8 (eight) hours as needed for mild pain (1-3)., Disp: , Rfl:   •  ALPRAZolam (XANAX) 0.5 MG tablet, Take 1 tablet by mouth 3 (Three) Times a Day As Needed for Anxiety. (Patient taking differently: Take 0.5 mg by mouth As Needed for Anxiety.), Disp: 90 tablet, Rfl: 0  •  aspirin 81 MG EC tablet, Take 81 mg by mouth Daily., Disp: , Rfl:   •  esomeprazole (nexIUM) 40 MG capsule, TAKE 1 CAPSULE EVERY MORNING BEFORE BREAKFAST, Disp: 90 capsule, Rfl: 3  •  ezetimibe-simvastatin (VYTORIN) 10-20 MG per tablet, TAKE 1 TABLET EVERY NIGHT, Disp: 90 tablet, Rfl: 3  •  metoprolol succinate XL (TOPROL-XL) 50 MG 24 hr tablet, TAKE 1 TABLET DAILY, Disp: 90 tablet, Rfl: 3  •  montelukast (SINGULAIR) 10 MG tablet, Take 1 tablet by  mouth Every Night., Disp: 90 tablet, Rfl: 3  •  Probiotic Product (PROBIOTIC PO), Take  by mouth., Disp: , Rfl:   •  SUMAtriptan (IMITREX) 100 MG tablet, Take one tablet at onset of headache. May repeat dose one time in 2 hours if headache not relieved., Disp: 90 tablet, Rfl: 3  •  butalbital-aspirin-caffeine-codeine (FIORINAL WITH CODEINE) -85-30 MG capsule, Take 1 capsule by mouth Every 6 (Six) Hours As Needed for Headache., Disp: 90 capsule, Rfl: 0  •  Calcium Carbonate-Vitamin D (CALCIUM + D PO), Take  by mouth., Disp: , Rfl:   •  diclofenac (VOLTAREN) 1 % gel gel, Apply 4 g topically 4 (Four) Times a Day As Needed., Disp: , Rfl:   •  estradiol (ESTRACE) 0.1 MG/GM vaginal cream, Insert 2 g into the vagina Daily., Disp: , Rfl:   •  meclizine (ANTIVERT) 25 MG tablet, Take 1 tablet by mouth 3 (Three) Times a Day As Needed for dizziness., Disp: 180 tablet, Rfl: 1  •  Multiple Minerals-Vitamins (ADVANCED CALCIUM/D/MAGNESIUM) tablet, Take  by mouth., Disp: , Rfl:   •  promethazine-codeine (PHENERGAN with CODEINE) 6.25-10 MG/5ML syrup, Take 5 mL by mouth Every 4 (Four) Hours As Needed for Cough., Disp: 180 mL, Rfl: 0  •  tacrolimus (PROTOPIC) 0.1 % ointment, Apply  topically 2 (Two) Times a Day., Disp: 100 g, Rfl: 3  •  triamcinolone (KENALOG) 0.1 % ointment, Apply  topically 2 (Two) Times a Day., Disp: 80 g, Rfl: 0  •  triamterene-hydrochlorothiazide (MAXZIDE-25) 37.5-25 MG per tablet, TAKE 1 TABLET DAILY (Patient taking differently: TAKE 1/2 TABLET DAILY), Disp: 90 tablet, Rfl: 3     Objective     This pleasant patient is here to follow-up on lab work done last week.    She complains of migraine headaches frequently.  Imitrex usually manages to take her headache away promptly.    She request a refill of Imitrex.    She takes alprazolam very sparingly.  Although the prescription is written to take 3 times as needed, she seldom finds that she needs it.  She is got the same prescription for the last 2 years  without having gotten a refill.       Review of Systems   Constitutional: Negative.    HENT: Negative.    Respiratory: Negative.    Cardiovascular: Negative.    Musculoskeletal:        She has severe diffuse arthritic pain.   Neurological: Negative.    Psychiatric/Behavioral: Negative.        Physical Exam   Constitutional: She is oriented to person, place, and time. She appears well-developed and well-nourished.   Pleasant, neatly groomed, BMI 22.9.   HENT:   Head: Normocephalic and atraumatic.   Cardiovascular: Normal rate, regular rhythm and normal heart sounds.   Pulmonary/Chest: Effort normal and breath sounds normal.   Neurological: She is oriented to person, place, and time.   Psychiatric: She has a normal mood and affect. Her behavior is normal. Thought content normal.   Nursing note and vitals reviewed.        Problem List Items Addressed This Visit        Cardiovascular and Mediastinum    Benign essential hypertension - Primary    Hyperlipidemia    Migraine    Relevant Medications    SUMAtriptan (IMITREX) 100 MG tablet            PLAN    Her hypertension is well controlled, her hypercholesterolemia is well managed.    She needs Imitrex to help manage her migraine headaches.  I sent a prescription out for 90 tablets to her mail order pharmacy.  Hopefully, she can get these medications.  If not, her pharmacy or insurance will let me know what the appropriate number four 3-month supply is.  She seems to be doing well.    I asked her to follow-up in about 4 to 6 months with fasting labs prior to that visit should that should include: Comprehensive metabolic panel, CBC, urinalysis, vitamin D level.  No follow-ups on file.

## 2020-06-19 RX ORDER — ALPRAZOLAM 0.5 MG/1
0.5 TABLET ORAL 3 TIMES DAILY PRN
Qty: 90 TABLET | Refills: 0 | Status: SHIPPED | OUTPATIENT
Start: 2020-06-19 | End: 2020-10-27

## 2020-08-31 DIAGNOSIS — R06.00 PAROXYSMAL DYSPNEA: Primary | ICD-10-CM

## 2020-08-31 RX ORDER — MONTELUKAST SODIUM 10 MG/1
10 TABLET ORAL NIGHTLY
Qty: 90 TABLET | Refills: 1 | Status: SHIPPED | OUTPATIENT
Start: 2020-08-31 | End: 2021-04-16

## 2020-10-15 DIAGNOSIS — M15.9 GENERALIZED OSTEOARTHRITIS OF MULTIPLE SITES: ICD-10-CM

## 2020-10-15 DIAGNOSIS — E55.9 VITAMIN D DEFICIENCY: ICD-10-CM

## 2020-10-15 DIAGNOSIS — I10 BENIGN ESSENTIAL HYPERTENSION: Primary | ICD-10-CM

## 2020-10-15 DIAGNOSIS — R73.9 BLOOD GLUCOSE ELEVATED: ICD-10-CM

## 2020-10-15 DIAGNOSIS — E78.2 MIXED HYPERLIPIDEMIA: ICD-10-CM

## 2020-10-15 DIAGNOSIS — R53.82 CHRONIC FATIGUE: ICD-10-CM

## 2020-10-23 LAB
25(OH)D3+25(OH)D2 SERPL-MCNC: 45.9 NG/ML (ref 30–100)
ALBUMIN SERPL-MCNC: 4.5 G/DL (ref 3.5–5.2)
ALBUMIN/GLOB SERPL: 1.9 G/DL
ALP SERPL-CCNC: 92 U/L (ref 39–117)
ALT SERPL-CCNC: 10 U/L (ref 1–33)
AST SERPL-CCNC: 19 U/L (ref 1–32)
BASOPHILS # BLD AUTO: 0.03 10*3/MM3 (ref 0–0.2)
BASOPHILS NFR BLD AUTO: 0.6 % (ref 0–1.5)
BILIRUB SERPL-MCNC: 1 MG/DL (ref 0–1.2)
BUN SERPL-MCNC: 24 MG/DL (ref 8–23)
BUN/CREAT SERPL: 25.8 (ref 7–25)
CALCIUM SERPL-MCNC: 10.1 MG/DL (ref 8.6–10.5)
CHLORIDE SERPL-SCNC: 103 MMOL/L (ref 98–107)
CO2 SERPL-SCNC: 28 MMOL/L (ref 22–29)
CREAT SERPL-MCNC: 0.93 MG/DL (ref 0.57–1)
EOSINOPHIL # BLD AUTO: 0.16 10*3/MM3 (ref 0–0.4)
EOSINOPHIL NFR BLD AUTO: 3.2 % (ref 0.3–6.2)
ERYTHROCYTE [DISTWIDTH] IN BLOOD BY AUTOMATED COUNT: 12.4 % (ref 12.3–15.4)
GLOBULIN SER CALC-MCNC: 2.4 GM/DL
GLUCOSE SERPL-MCNC: 97 MG/DL (ref 65–99)
HCT VFR BLD AUTO: 42.8 % (ref 34–46.6)
HGB BLD-MCNC: 14.1 G/DL (ref 12–15.9)
IMM GRANULOCYTES # BLD AUTO: 0.01 10*3/MM3 (ref 0–0.05)
IMM GRANULOCYTES NFR BLD AUTO: 0.2 % (ref 0–0.5)
LYMPHOCYTES # BLD AUTO: 1.33 10*3/MM3 (ref 0.7–3.1)
LYMPHOCYTES NFR BLD AUTO: 26.3 % (ref 19.6–45.3)
MCH RBC QN AUTO: 29.8 PG (ref 26.6–33)
MCHC RBC AUTO-ENTMCNC: 32.9 G/DL (ref 31.5–35.7)
MCV RBC AUTO: 90.5 FL (ref 79–97)
MONOCYTES # BLD AUTO: 0.4 10*3/MM3 (ref 0.1–0.9)
MONOCYTES NFR BLD AUTO: 7.9 % (ref 5–12)
NEUTROPHILS # BLD AUTO: 3.12 10*3/MM3 (ref 1.7–7)
NEUTROPHILS NFR BLD AUTO: 61.8 % (ref 42.7–76)
NRBC BLD AUTO-RTO: 0 /100 WBC (ref 0–0.2)
PLATELET # BLD AUTO: 188 10*3/MM3 (ref 140–450)
POTASSIUM SERPL-SCNC: 4.4 MMOL/L (ref 3.5–5.2)
PROT SERPL-MCNC: 6.9 G/DL (ref 6–8.5)
RBC # BLD AUTO: 4.73 10*6/MM3 (ref 3.77–5.28)
SODIUM SERPL-SCNC: 139 MMOL/L (ref 136–145)
UNABLE TO VOID: NORMAL
WBC # BLD AUTO: 5.05 10*3/MM3 (ref 3.4–10.8)

## 2020-10-27 ENCOUNTER — OFFICE VISIT (OUTPATIENT)
Dept: FAMILY MEDICINE CLINIC | Facility: CLINIC | Age: 85
End: 2020-10-27

## 2020-10-27 VITALS
SYSTOLIC BLOOD PRESSURE: 116 MMHG | HEIGHT: 63 IN | BODY MASS INDEX: 23.71 KG/M2 | TEMPERATURE: 96.6 F | WEIGHT: 133.8 LBS | OXYGEN SATURATION: 98 % | DIASTOLIC BLOOD PRESSURE: 78 MMHG | HEART RATE: 58 BPM | RESPIRATION RATE: 18 BRPM

## 2020-10-27 DIAGNOSIS — E78.2 MIXED HYPERLIPIDEMIA: ICD-10-CM

## 2020-10-27 DIAGNOSIS — I10 BENIGN ESSENTIAL HYPERTENSION: Primary | ICD-10-CM

## 2020-10-27 PROCEDURE — 99214 OFFICE O/P EST MOD 30 MIN: CPT | Performed by: INTERNAL MEDICINE

## 2020-10-27 RX ORDER — ALPRAZOLAM 0.5 MG/1
0.5 TABLET ORAL NIGHTLY PRN
COMMUNITY
End: 2020-11-04 | Stop reason: SDUPTHER

## 2020-10-27 NOTE — PROGRESS NOTES
Subjective   Sirisha Auguste is a 85 y.o. female. Patient is here today for   Chief Complaint   Patient presents with   • Anxiety     f/u labs   • Hyperlipidemia   • Hypertension   • Migraine          Vitals:    10/27/20 1000   BP: 116/78   Pulse: 58   Resp: 18   Temp: 96.6 °F (35.9 °C)   SpO2: 98%     Body mass index is 24.08 kg/m².      Past Medical History:   Diagnosis Date   • Arthritis    • Breast cyst    • GERD (gastroesophageal reflux disease)    • Hyperglycemia    • Hyperlipidemia    • Hypertension    • Migraines    • Murmur    • RBBB       Allergies   Allergen Reactions   • Sulfa Antibiotics Other (See Comments)     unknown      Social History     Socioeconomic History   • Marital status:      Spouse name: Not on file   • Number of children: Not on file   • Years of education: Not on file   • Highest education level: Not on file   Tobacco Use   • Smoking status: Former Smoker   • Smokeless tobacco: Never Used   • Tobacco comment: When she was 18   Substance and Sexual Activity   • Alcohol use: Never     Frequency: Never   • Drug use: No   • Sexual activity: Not Currently        Current Outpatient Medications:   •  ALPRAZolam (XANAX) 0.5 MG tablet, Take 0.5 mg by mouth At Night As Needed for Anxiety., Disp: , Rfl:   •  aspirin 81 MG EC tablet, Take 81 mg by mouth Daily., Disp: , Rfl:   •  diclofenac (VOLTAREN) 1 % gel gel, Apply 4 g topically 4 (Four) Times a Day As Needed., Disp: , Rfl:   •  esomeprazole (nexIUM) 40 MG capsule, TAKE 1 CAPSULE EVERY MORNING BEFORE BREAKFAST, Disp: 90 capsule, Rfl: 3  •  estradiol (ESTRACE) 0.1 MG/GM vaginal cream, Insert 2 g into the vagina Daily., Disp: , Rfl:   •  ezetimibe-simvastatin (VYTORIN) 10-20 MG per tablet, TAKE 1 TABLET EVERY NIGHT, Disp: 90 tablet, Rfl: 3  •  meclizine (ANTIVERT) 25 MG tablet, Take 1 tablet by mouth 3 (Three) Times a Day As Needed for dizziness., Disp: 180 tablet, Rfl: 1  •  metoprolol succinate XL (TOPROL-XL) 50 MG 24 hr tablet, TAKE 1  TABLET DAILY, Disp: 90 tablet, Rfl: 3  •  montelukast (SINGULAIR) 10 MG tablet, Take 1 tablet by mouth Every Night., Disp: 90 tablet, Rfl: 1  •  Multiple Minerals-Vitamins (ADVANCED CALCIUM/D/MAGNESIUM) tablet, Take  by mouth., Disp: , Rfl:   •  Probiotic Product (PROBIOTIC PO), Take  by mouth Every Other Day., Disp: , Rfl:   •  promethazine-codeine (PHENERGAN with CODEINE) 6.25-10 MG/5ML syrup, Take 5 mL by mouth Every 4 (Four) Hours As Needed for Cough., Disp: 180 mL, Rfl: 0  •  SUMAtriptan (IMITREX) 100 MG tablet, Take one tablet at onset of headache. May repeat dose one time in 2 hours if headache not relieved., Disp: 90 tablet, Rfl: 3  •  triamterene-hydrochlorothiazide (MAXZIDE-25) 37.5-25 MG per tablet, TAKE 1 TABLET DAILY (Patient taking differently: TAKE 1/2 TABLET DAILY), Disp: 90 tablet, Rfl: 3     Objective     She is here today to review some labs done last week.    She is accompanied by daughter-in-law.    She has no complaints.    Anxiety        Hyperlipidemia    Hypertension  Associated symptoms include anxiety.   Migraine          Review of Systems   Constitutional: Negative.    HENT: Negative.    Respiratory: Negative.    Cardiovascular: Negative.    Musculoskeletal: Negative.    Psychiatric/Behavioral: Negative.        Physical Exam  Vitals signs and nursing note reviewed.   Constitutional:       Appearance: Normal appearance.      Comments: Pleasant, neatly groomed, in no distress.   Neck:      Musculoskeletal: Normal range of motion and neck supple.   Cardiovascular:      Rate and Rhythm: Regular rhythm.      Pulses: Normal pulses.      Heart sounds: Normal heart sounds. No murmur. No gallop.    Pulmonary:      Effort: Pulmonary effort is normal.      Breath sounds: Normal breath sounds.   Neurological:      General: No focal deficit present.      Mental Status: She is alert and oriented to person, place, and time.   Psychiatric:         Mood and Affect: Mood normal.         Behavior: Behavior  normal.           Problems Addressed this Visit        Cardiovascular and Mediastinum    Benign essential hypertension - Primary    Hyperlipidemia      Diagnoses       Codes Comments    Benign essential hypertension    -  Primary ICD-10-CM: I10  ICD-9-CM: 401.1     Mixed hyperlipidemia     ICD-10-CM: E78.2  ICD-9-CM: 272.2             PLAN  She and I reviewed her labs.    She has good control of her hypercholesterolemia.    Her hyper tension is well controlled.    I asked her to follow-up in 4 months or so with fasting labs prior to that visit which should include lipid profile, comprehensive metabolic panel, CBC, urinalysis, TSH and free T4.  No follow-ups on file.

## 2020-11-04 RX ORDER — ALPRAZOLAM 0.5 MG/1
0.5 TABLET ORAL NIGHTLY PRN
Qty: 90 TABLET | Refills: 1 | Status: SHIPPED | OUTPATIENT
Start: 2020-11-04 | End: 2021-05-26 | Stop reason: SDUPTHER

## 2020-11-04 NOTE — TELEPHONE ENCOUNTER
Last OV: 10/27/2020  Pt states this was supposed to be sent at her appointment but it was not. Can we get this sent in rather quickly for pt?    Anesthesia Type: 1% lidocaine with 1:100,000 epinephrine and a 1:10 solution of 8.4% sodium bicarbonate

## 2020-11-05 ENCOUNTER — TELEPHONE (OUTPATIENT)
Dept: FAMILY MEDICINE CLINIC | Facility: CLINIC | Age: 85
End: 2020-11-05

## 2020-11-05 NOTE — TELEPHONE ENCOUNTER
PATIENT IS REQUESTING TO GET HER BLOOD WORK DONE BEFORE HER APPT ON MAY 25TH 2021.     PATIENT IS REQUESTING 5-20-21 @10AM OR EARLIER FOR HER BLOOD WORK.    PATIENT STATED SHE WOULD LIKE TO GO OVER RESULTS DURING OFFICE VISIT.    PLEASE ADVISE.    PATIENT CALL BACK: [phone]

## 2020-11-18 ENCOUNTER — TRANSCRIBE ORDERS (OUTPATIENT)
Dept: ADMINISTRATIVE | Facility: HOSPITAL | Age: 85
End: 2020-11-18

## 2020-11-18 DIAGNOSIS — N63.23 UNSPECIFIED LUMP IN THE LEFT BREAST, LOWER OUTER QUADRANT: ICD-10-CM

## 2020-11-18 DIAGNOSIS — N63.20 MASS OF LEFT BREAST: ICD-10-CM

## 2020-11-18 DIAGNOSIS — N63.20 LEFT BREAST LUMP: ICD-10-CM

## 2020-12-09 ENCOUNTER — APPOINTMENT (OUTPATIENT)
Dept: MAMMOGRAPHY | Facility: HOSPITAL | Age: 85
End: 2020-12-09

## 2020-12-09 ENCOUNTER — HOSPITAL ENCOUNTER (OUTPATIENT)
Dept: ULTRASOUND IMAGING | Facility: HOSPITAL | Age: 85
End: 2020-12-09

## 2021-01-04 ENCOUNTER — HOSPITAL ENCOUNTER (OUTPATIENT)
Dept: ULTRASOUND IMAGING | Facility: HOSPITAL | Age: 86
Discharge: HOME | End: 2021-01-04

## 2021-01-04 ENCOUNTER — HOSPITAL ENCOUNTER (OUTPATIENT)
Dept: MAMMOGRAPHY | Facility: HOSPITAL | Age: 86
Discharge: HOME | End: 2021-01-04

## 2021-01-04 DIAGNOSIS — N63.23 UNSPECIFIED LUMP IN THE LEFT BREAST, LOWER OUTER QUADRANT: ICD-10-CM

## 2021-01-04 DIAGNOSIS — N63.20 MASS OF LEFT BREAST: ICD-10-CM

## 2021-01-04 PROCEDURE — 77066 DX MAMMO INCL CAD BI: CPT

## 2021-01-04 PROCEDURE — G0279 TOMOSYNTHESIS, MAMMO: HCPCS

## 2021-01-04 PROCEDURE — 76642 ULTRASOUND BREAST LIMITED: CPT

## 2021-04-16 DIAGNOSIS — R06.00 PAROXYSMAL DYSPNEA: ICD-10-CM

## 2021-04-16 RX ORDER — MONTELUKAST SODIUM 10 MG/1
TABLET ORAL
Qty: 90 TABLET | Refills: 3 | Status: SHIPPED | OUTPATIENT
Start: 2021-04-16 | End: 2023-02-21 | Stop reason: SDUPTHER

## 2021-04-26 RX ORDER — ESOMEPRAZOLE MAGNESIUM 40 MG/1
CAPSULE, DELAYED RELEASE ORAL
Qty: 90 CAPSULE | Refills: 3 | Status: SHIPPED | OUTPATIENT
Start: 2021-04-26 | End: 2022-10-21 | Stop reason: SDUPTHER

## 2021-05-17 DIAGNOSIS — R53.82 CHRONIC FATIGUE: ICD-10-CM

## 2021-05-17 DIAGNOSIS — E78.2 MIXED HYPERLIPIDEMIA: ICD-10-CM

## 2021-05-27 ENCOUNTER — OFFICE VISIT (OUTPATIENT)
Dept: FAMILY MEDICINE CLINIC | Facility: CLINIC | Age: 86
End: 2021-05-27

## 2021-05-27 VITALS
DIASTOLIC BLOOD PRESSURE: 80 MMHG | HEIGHT: 63 IN | SYSTOLIC BLOOD PRESSURE: 122 MMHG | HEART RATE: 60 BPM | WEIGHT: 135.2 LBS | RESPIRATION RATE: 18 BRPM | TEMPERATURE: 97.3 F | OXYGEN SATURATION: 95 % | BODY MASS INDEX: 23.96 KG/M2

## 2021-05-27 DIAGNOSIS — I45.10 RBBB: ICD-10-CM

## 2021-05-27 DIAGNOSIS — I10 BENIGN ESSENTIAL HYPERTENSION: ICD-10-CM

## 2021-05-27 DIAGNOSIS — R06.09 DYSPNEA ON EXERTION: Primary | ICD-10-CM

## 2021-05-27 PROCEDURE — 99214 OFFICE O/P EST MOD 30 MIN: CPT | Performed by: INTERNAL MEDICINE

## 2021-05-27 PROCEDURE — 93000 ELECTROCARDIOGRAM COMPLETE: CPT | Performed by: INTERNAL MEDICINE

## 2021-05-27 RX ORDER — ALPRAZOLAM 0.5 MG/1
0.5 TABLET ORAL NIGHTLY PRN
Qty: 90 TABLET | Refills: 1 | Status: SHIPPED | OUTPATIENT
Start: 2021-05-27 | End: 2021-11-15

## 2021-05-27 NOTE — PROGRESS NOTES
Subjective   Sirisha Auguste is a 85 y.o. female. Patient is here today for   Chief Complaint   Patient presents with   • Hypertension     HYPERLIPIDEMIA- FOLLOW UP LABS          Vitals:    05/27/21 0758   BP: 122/80   Pulse: 60   Resp: 18   Temp: 97.3 °F (36.3 °C)   SpO2: 95%     Body mass index is 24.32 kg/m².      Past Medical History:   Diagnosis Date   • Arthritis    • Breast cyst    • GERD (gastroesophageal reflux disease)    • Hyperglycemia    • Hyperlipidemia    • Hypertension    • Migraines    • Murmur    • RBBB       Allergies   Allergen Reactions   • Sulfa Antibiotics Other (See Comments)     unknown      Social History     Socioeconomic History   • Marital status:      Spouse name: Not on file   • Number of children: Not on file   • Years of education: Not on file   • Highest education level: Not on file   Tobacco Use   • Smoking status: Former Smoker   • Smokeless tobacco: Never Used   • Tobacco comment: When she was 18   Substance and Sexual Activity   • Alcohol use: Never   • Drug use: No   • Sexual activity: Not Currently        Current Outpatient Medications:   •  ALPRAZolam (XANAX) 0.5 MG tablet, Take 1 tablet by mouth At Night As Needed for Anxiety., Disp: 90 tablet, Rfl: 1  •  aspirin 81 MG EC tablet, Take 81 mg by mouth Daily., Disp: , Rfl:   •  diclofenac (VOLTAREN) 1 % gel gel, Apply 4 g topically 4 (Four) Times a Day As Needed., Disp: , Rfl:   •  esomeprazole (nexIUM) 40 MG capsule, TAKE 1 CAPSULE EVERY MORNING BEFORE BREAKFAST, Disp: 90 capsule, Rfl: 3  •  estradiol (ESTRACE) 0.1 MG/GM vaginal cream, Insert 2 g into the vagina Daily., Disp: , Rfl:   •  ezetimibe-simvastatin (VYTORIN) 10-20 MG per tablet, TAKE 1 TABLET EVERY NIGHT, Disp: 90 tablet, Rfl: 3  •  meclizine (ANTIVERT) 25 MG tablet, Take 1 tablet by mouth 3 (Three) Times a Day As Needed for dizziness., Disp: 180 tablet, Rfl: 1  •  metoprolol succinate XL (TOPROL-XL) 50 MG 24 hr tablet, TAKE 1 TABLET DAILY, Disp: 90 tablet,  Rfl: 3  •  montelukast (SINGULAIR) 10 MG tablet, TAKE 1 TABLET EVERY NIGHT, Disp: 90 tablet, Rfl: 3  •  Multiple Minerals-Vitamins (ADVANCED CALCIUM/D/MAGNESIUM) tablet, Take  by mouth., Disp: , Rfl:   •  Probiotic Product (PROBIOTIC PO), Take  by mouth Every Other Day., Disp: , Rfl:   •  promethazine-codeine (PHENERGAN with CODEINE) 6.25-10 MG/5ML syrup, Take 5 mL by mouth Every 4 (Four) Hours As Needed for Cough., Disp: 180 mL, Rfl: 0  •  SUMAtriptan (IMITREX) 100 MG tablet, Take one tablet at onset of headache. May repeat dose one time in 2 hours if headache not relieved., Disp: 90 tablet, Rfl: 3  •  triamterene-hydrochlorothiazide (MAXZIDE-25) 37.5-25 MG per tablet, TAKE 1 TABLET DAILY (Patient taking differently: TAKE 1/2 TABLET DAILY), Disp: 90 tablet, Rfl: 3     Objective     She is here to follow-up on some labs that she had done last week.  He is concerned about a few things besides.    She is short of breath when she exerts herself.  She is weak in her arms and legs and she runs out of steam pretty quickly.    She denies any chest pains.  She denies any paroxysmal nocturnal dyspnea.    She never was a smoker.  She does not drink alcohol.    She does not exercise.  She used to go to the pool and swim routinely for exercise but he has not done that for about a year and a half now.    Hypertension  Associated symptoms include shortness of breath.        Review of Systems   Constitutional: Positive for fatigue.   Respiratory: Positive for shortness of breath.    Musculoskeletal: Positive for myalgias.   Psychiatric/Behavioral: Negative.        Physical Exam  Vitals and nursing note reviewed.   Constitutional:       General: She is not in acute distress.     Appearance: Normal appearance. She is normal weight. She is not ill-appearing, toxic-appearing or diaphoretic.      Comments: Pleasant, neatly groomed, in no distress.   Eyes:      General: No scleral icterus.     Conjunctiva/sclera: Conjunctivae normal.    Cardiovascular:      Rate and Rhythm: Regular rhythm.      Heart sounds: Normal heart sounds. No murmur heard.   No gallop.    Pulmonary:      Effort: No respiratory distress.      Breath sounds: Normal breath sounds. No wheezing or rales.   Musculoskeletal:         General: No swelling.      Comments: She has Heberden's and Luciano's nodes in both hands.   Neurological:      Mental Status: She is alert and oriented to person, place, and time.   Psychiatric:         Mood and Affect: Mood normal.         Behavior: Behavior normal.         Thought Content: Thought content normal.           Problems Addressed this Visit        Cardiac and Vasculature    Benign essential hypertension    Dyspnea on exertion - Primary    Relevant Orders    Full Pulmonary Function Test With Bronchodilator    ECG 12 Lead (Completed)    Adult Transthoracic Echo Complete W/ Cont if Necessary Per Protocol    BNP ([facility] Only)    RBBB      Diagnoses       Codes Comments    Dyspnea on exertion    -  Primary ICD-10-CM: R06.00  ICD-9-CM: 786.09     RBBB     ICD-10-CM: I45.10  ICD-9-CM: 426.4     Benign essential hypertension     ICD-10-CM: I10  ICD-9-CM: 401.1             PLAN  An EKG today shows a right bundle branch block unchanged from an EKG done on August 6, 2018.    Hypertension is well controlled.    Her hypercholesterolemia is well controlled.    I am going to arrange for her to get pulmonary function testing, 2D echocardiogram, get a BNP on labs today.    Would like to have her back to discuss the results when the results of the tests are available.    I think she could benefit from physical therapy for strengthening, but will hold off arranging physical therapy for her until she and I discussed the above results.  No follow-ups on file.

## 2021-05-28 DIAGNOSIS — G43.709 CHRONIC MIGRAINE WITHOUT AURA WITHOUT STATUS MIGRAINOSUS, NOT INTRACTABLE: ICD-10-CM

## 2021-05-28 LAB — BNP SERPL-MCNC: 89.4 PG/ML (ref 0–100)

## 2021-05-28 RX ORDER — METOPROLOL SUCCINATE 50 MG/1
TABLET, EXTENDED RELEASE ORAL
Qty: 90 TABLET | Refills: 3 | Status: SHIPPED | OUTPATIENT
Start: 2021-05-28 | End: 2022-07-15 | Stop reason: HOSPADM

## 2021-05-28 RX ORDER — SUMATRIPTAN 100 MG/1
TABLET, FILM COATED ORAL
Qty: 810 TABLET | Refills: 3 | Status: SHIPPED | OUTPATIENT
Start: 2021-05-28 | End: 2022-10-21 | Stop reason: SDUPTHER

## 2021-06-04 ENCOUNTER — TRANSCRIBE ORDERS (OUTPATIENT)
Dept: ADMINISTRATIVE | Facility: HOSPITAL | Age: 86
End: 2021-06-04

## 2021-06-04 DIAGNOSIS — Z01.818 OTHER SPECIFIED PRE-OPERATIVE EXAMINATION: Primary | ICD-10-CM

## 2021-06-28 RX ORDER — EZETIMIBE AND SIMVASTATIN 10; 20 MG/1; MG/1
TABLET ORAL
Qty: 90 TABLET | Refills: 3 | Status: SHIPPED | OUTPATIENT
Start: 2021-06-28 | End: 2022-07-28

## 2021-07-14 ENCOUNTER — LAB (OUTPATIENT)
Dept: LAB | Facility: HOSPITAL | Age: 86
End: 2021-07-14

## 2021-07-14 DIAGNOSIS — Z01.818 OTHER SPECIFIED PRE-OPERATIVE EXAMINATION: ICD-10-CM

## 2021-07-14 LAB — SARS-COV-2 ORF1AB RESP QL NAA+PROBE: NOT DETECTED

## 2021-07-14 PROCEDURE — C9803 HOPD COVID-19 SPEC COLLECT: HCPCS

## 2021-07-14 PROCEDURE — U0004 COV-19 TEST NON-CDC HGH THRU: HCPCS

## 2021-07-14 PROCEDURE — U0005 INFEC AGEN DETEC AMPLI PROBE: HCPCS

## 2021-07-16 ENCOUNTER — HOSPITAL ENCOUNTER (OUTPATIENT)
Dept: RESPIRATORY THERAPY | Facility: HOSPITAL | Age: 86
Discharge: HOME | End: 2021-07-16

## 2021-07-16 ENCOUNTER — HOSPITAL ENCOUNTER (OUTPATIENT)
Dept: CARDIOLOGY | Facility: HOSPITAL | Age: 86
Discharge: HOME | End: 2021-07-16

## 2021-07-16 DIAGNOSIS — R06.09 DYSPNEA ON EXERTION: ICD-10-CM

## 2021-07-16 LAB
AORTIC ARCH: 2.7 CM
AORTIC DIMENSIONLESS INDEX: 1 (DI)
BH CV ECHO MEAS - ACS: 2.2 CM
BH CV ECHO MEAS - AO ARCH DIAM (PROXIMAL TRANS.): 2.7 CM
BH CV ECHO MEAS - AO MAX PG (FULL): -0.53 MMHG
BH CV ECHO MEAS - AO MAX PG: 10.9 MMHG
BH CV ECHO MEAS - AO MEAN PG (FULL): 0 MMHG
BH CV ECHO MEAS - AO MEAN PG: 7 MMHG
BH CV ECHO MEAS - AO ROOT AREA (BSA CORRECTED): 2
BH CV ECHO MEAS - AO ROOT AREA: 8.6 CM^2
BH CV ECHO MEAS - AO ROOT DIAM: 3.3 CM
BH CV ECHO MEAS - AO V2 MAX: 165 CM/SEC
BH CV ECHO MEAS - AO V2 MEAN: 127 CM/SEC
BH CV ECHO MEAS - AO V2 VTI: 38 CM
BH CV ECHO MEAS - ASC AORTA: 3.2 CM
BH CV ECHO MEAS - AVA(I,A): 2.9 CM^2
BH CV ECHO MEAS - AVA(I,D): 2.9 CM^2
BH CV ECHO MEAS - AVA(V,A): 2.9 CM^2
BH CV ECHO MEAS - AVA(V,D): 2.9 CM^2
BH CV ECHO MEAS - BSA(HAYCOCK): 1.6 M^2
BH CV ECHO MEAS - BSA: 1.6 M^2
BH CV ECHO MEAS - BZI_BMI: 24.7 KILOGRAMS/M^2
BH CV ECHO MEAS - BZI_METRIC_HEIGHT: 157.5 CM
BH CV ECHO MEAS - BZI_METRIC_WEIGHT: 61.2 KG
BH CV ECHO MEAS - EDV(CUBED): 59.3 ML
BH CV ECHO MEAS - EDV(MOD-SP2): 43 ML
BH CV ECHO MEAS - EDV(MOD-SP4): 50 ML
BH CV ECHO MEAS - EDV(TEICH): 65.9 ML
BH CV ECHO MEAS - EF(CUBED): 76.7 %
BH CV ECHO MEAS - EF(MOD-BP): 65 %
BH CV ECHO MEAS - EF(MOD-SP2): 62.8 %
BH CV ECHO MEAS - EF(MOD-SP4): 64 %
BH CV ECHO MEAS - EF(TEICH): 69.4 %
BH CV ECHO MEAS - ESV(CUBED): 13.8 ML
BH CV ECHO MEAS - ESV(MOD-SP2): 16 ML
BH CV ECHO MEAS - ESV(MOD-SP4): 18 ML
BH CV ECHO MEAS - ESV(TEICH): 20.2 ML
BH CV ECHO MEAS - FS: 38.5 %
BH CV ECHO MEAS - IVS/LVPW: 1.1
BH CV ECHO MEAS - IVSD: 1.6 CM
BH CV ECHO MEAS - LAT PEAK E' VEL: 16.3 CM/SEC
BH CV ECHO MEAS - LV DIASTOLIC VOL/BSA (35-75): 30.9 ML/M^2
BH CV ECHO MEAS - LV MASS(C)D: 224.6 GRAMS
BH CV ECHO MEAS - LV MASS(C)DI: 138.8 GRAMS/M^2
BH CV ECHO MEAS - LV MAX PG: 11.4 MMHG
BH CV ECHO MEAS - LV MEAN PG: 7 MMHG
BH CV ECHO MEAS - LV SYSTOLIC VOL/BSA (12-30): 11.1 ML/M^2
BH CV ECHO MEAS - LV V1 MAX: 169 CM/SEC
BH CV ECHO MEAS - LV V1 MEAN: 124 CM/SEC
BH CV ECHO MEAS - LV V1 VTI: 38.9 CM
BH CV ECHO MEAS - LVIDD: 3.9 CM
BH CV ECHO MEAS - LVIDS: 2.4 CM
BH CV ECHO MEAS - LVLD AP2: 6.9 CM
BH CV ECHO MEAS - LVLD AP4: 6.4 CM
BH CV ECHO MEAS - LVLS AP2: 5.7 CM
BH CV ECHO MEAS - LVLS AP4: 5.7 CM
BH CV ECHO MEAS - LVOT AREA (M): 2.8 CM^2
BH CV ECHO MEAS - LVOT AREA: 2.8 CM^2
BH CV ECHO MEAS - LVOT DIAM: 1.9 CM
BH CV ECHO MEAS - LVPWD: 1.4 CM
BH CV ECHO MEAS - MED PEAK E' VEL: 19.6 CM/SEC
BH CV ECHO MEAS - MV A MAX VEL: 141 CM/SEC
BH CV ECHO MEAS - MV DEC SLOPE: 366 CM/SEC^2
BH CV ECHO MEAS - MV DEC TIME: 366 SEC
BH CV ECHO MEAS - MV E MAX VEL: 118 CM/SEC
BH CV ECHO MEAS - MV E/A: 0.84
BH CV ECHO MEAS - MV MEAN PG: 3 MMHG
BH CV ECHO MEAS - MV P1/2T MAX VEL: 130 CM/SEC
BH CV ECHO MEAS - MV P1/2T: 104 MSEC
BH CV ECHO MEAS - MV V2 MEAN: 74.8 CM/SEC
BH CV ECHO MEAS - MV V2 VTI: 50.3 CM
BH CV ECHO MEAS - MVA P1/2T LCG: 1.7 CM^2
BH CV ECHO MEAS - MVA(P1/2T): 2.1 CM^2
BH CV ECHO MEAS - MVA(VTI): 2.2 CM^2
BH CV ECHO MEAS - PA MAX PG (FULL): 1.8 MMHG
BH CV ECHO MEAS - PA MAX PG: 3.8 MMHG
BH CV ECHO MEAS - PA V2 MAX: 97.7 CM/SEC
BH CV ECHO MEAS - PI END-D VEL: 96.4 CM/SEC
BH CV ECHO MEAS - RAP SYSTOLE: 3 MMHG
BH CV ECHO MEAS - RV MAX PG: 2 MMHG
BH CV ECHO MEAS - RV MEAN PG: 1 MMHG
BH CV ECHO MEAS - RV V1 MAX: 71.4 CM/SEC
BH CV ECHO MEAS - RV V1 MEAN: 42.9 CM/SEC
BH CV ECHO MEAS - RV V1 VTI: 14.8 CM
BH CV ECHO MEAS - RVSP: 34.4 MMHG
BH CV ECHO MEAS - SI(AO): 200.9 ML/M^2
BH CV ECHO MEAS - SI(CUBED): 28.1 ML/M^2
BH CV ECHO MEAS - SI(LVOT): 68.2 ML/M^2
BH CV ECHO MEAS - SI(MOD-SP2): 16.7 ML/M^2
BH CV ECHO MEAS - SI(MOD-SP4): 19.8 ML/M^2
BH CV ECHO MEAS - SI(TEICH): 28.3 ML/M^2
BH CV ECHO MEAS - SUP REN AO DIAM: 1.8 CM
BH CV ECHO MEAS - SV(AO): 325 ML
BH CV ECHO MEAS - SV(CUBED): 45.5 ML
BH CV ECHO MEAS - SV(LVOT): 110.3 ML
BH CV ECHO MEAS - SV(MOD-SP2): 27 ML
BH CV ECHO MEAS - SV(MOD-SP4): 32 ML
BH CV ECHO MEAS - SV(TEICH): 45.8 ML
BH CV ECHO MEAS - TAPSE (>1.6): 2 CM
BH CV ECHO MEAS - TR MAX VEL: 280 CM/SEC
BH CV ECHO MEASUREMENTS AVERAGE E/E' RATIO: 6.57
BH CV XLRA - RV BASE: 3.7 CM
BH CV XLRA - RV LENGTH: 4.7 CM
BH CV XLRA - RV MID: 2.3 CM
BH CV XLRA - TDI S': 11 CM/SEC
LEFT ATRIUM VOLUME INDEX: 46.3 ML/M2
LV EF 2D ECHO EST: 65 %
MAXIMAL PREDICTED HEART RATE: 135 BPM
SINUS: 3.5 CM
STJ: 2.9 CM
STRESS TARGET HR: 115 BPM

## 2021-07-16 PROCEDURE — 94640 AIRWAY INHALATION TREATMENT: CPT

## 2021-07-16 PROCEDURE — 93306 TTE W/DOPPLER COMPLETE: CPT | Performed by: INTERNAL MEDICINE

## 2021-07-16 PROCEDURE — 25010000002 PERFLUTREN (DEFINITY) 8.476 MG IN SODIUM CHLORIDE (PF) 0.9 % 10 ML INJECTION: Performed by: INTERNAL MEDICINE

## 2021-07-16 PROCEDURE — 94729 DIFFUSING CAPACITY: CPT

## 2021-07-16 PROCEDURE — 94060 EVALUATION OF WHEEZING: CPT

## 2021-07-16 PROCEDURE — 94726 PLETHYSMOGRAPHY LUNG VOLUMES: CPT

## 2021-07-16 PROCEDURE — 93306 TTE W/DOPPLER COMPLETE: CPT

## 2021-07-16 RX ORDER — ALBUTEROL SULFATE 2.5 MG/3ML
2.5 SOLUTION RESPIRATORY (INHALATION) ONCE
Status: COMPLETED | OUTPATIENT
Start: 2021-07-16 | End: 2021-07-16

## 2021-07-16 RX ADMIN — ALBUTEROL SULFATE 2.5 MG: 2.5 SOLUTION RESPIRATORY (INHALATION) at 15:31

## 2021-07-16 RX ADMIN — SODIUM CHLORIDE 2 ML: 9 INJECTION INTRAMUSCULAR; INTRAVENOUS; SUBCUTANEOUS at 15:05

## 2021-11-15 RX ORDER — ALPRAZOLAM 0.5 MG/1
TABLET ORAL
Qty: 90 TABLET | Refills: 1 | Status: SHIPPED | OUTPATIENT
Start: 2021-11-15 | End: 2022-10-21 | Stop reason: SDUPTHER

## 2021-11-15 NOTE — TELEPHONE ENCOUNTER
Rx Refill Note  Requested Prescriptions     Pending Prescriptions Disp Refills   • ALPRAZolam (XANAX) 0.5 MG tablet [Pharmacy Med Name: ALPRAZOLAM TABS 0.5MG] 90 tablet 1     Sig: TAKE 1 TABLET AT NIGHT AS NEEDED FOR ANXIETY      Last office visit with prescribing clinician: 5/27/2021      Next office visit with prescribing clinician: Visit date not found            Hannah Cline MA  11/15/21, 08:26 EST

## 2022-07-04 ENCOUNTER — HOSPITAL ENCOUNTER (EMERGENCY)
Facility: HOSPITAL | Age: 87
Discharge: HOME | End: 2022-07-04
Attending: EMERGENCY MEDICINE | Admitting: EMERGENCY MEDICINE

## 2022-07-04 ENCOUNTER — APPOINTMENT (OUTPATIENT)
Dept: CT IMAGING | Facility: HOSPITAL | Age: 87
End: 2022-07-04

## 2022-07-04 VITALS
BODY MASS INDEX: 23.92 KG/M2 | RESPIRATION RATE: 16 BRPM | SYSTOLIC BLOOD PRESSURE: 150 MMHG | WEIGHT: 135 LBS | HEIGHT: 63 IN | OXYGEN SATURATION: 94 % | TEMPERATURE: 98.2 F | HEART RATE: 55 BPM | DIASTOLIC BLOOD PRESSURE: 78 MMHG

## 2022-07-04 DIAGNOSIS — N39.0 ACUTE UTI: Primary | ICD-10-CM

## 2022-07-04 DIAGNOSIS — K63.89 MASS OF COLON: ICD-10-CM

## 2022-07-04 DIAGNOSIS — R93.5 ABNORMAL CT OF THE ABDOMEN: ICD-10-CM

## 2022-07-04 LAB
ALBUMIN SERPL-MCNC: 4 G/DL (ref 3.5–5.2)
ALBUMIN/GLOB SERPL: 1.3 G/DL
ALP SERPL-CCNC: 66 U/L (ref 39–117)
ALT SERPL W P-5'-P-CCNC: 11 U/L (ref 1–33)
ANION GAP SERPL CALCULATED.3IONS-SCNC: 7.7 MMOL/L (ref 5–15)
AST SERPL-CCNC: 20 U/L (ref 1–32)
BACTERIA UR QL AUTO: ABNORMAL /HPF
BASOPHILS # BLD AUTO: 0.02 10*3/MM3 (ref 0–0.2)
BASOPHILS NFR BLD AUTO: 0.2 % (ref 0–1.5)
BILIRUB SERPL-MCNC: 0.9 MG/DL (ref 0–1.2)
BILIRUB UR QL STRIP: NEGATIVE
BUN SERPL-MCNC: 18 MG/DL (ref 8–23)
BUN/CREAT SERPL: 21.2 (ref 7–25)
CALCIUM SPEC-SCNC: 10.1 MG/DL (ref 8.6–10.5)
CHLORIDE SERPL-SCNC: 104 MMOL/L (ref 98–107)
CLARITY UR: ABNORMAL
CO2 SERPL-SCNC: 23.3 MMOL/L (ref 22–29)
COLOR UR: YELLOW
CREAT SERPL-MCNC: 0.85 MG/DL (ref 0.57–1)
D-LACTATE SERPL-SCNC: 1.1 MMOL/L (ref 0.5–2)
DEPRECATED RDW RBC AUTO: 39.8 FL (ref 37–54)
EGFRCR SERPLBLD CKD-EPI 2021: 66.8 ML/MIN/1.73
EOSINOPHIL # BLD AUTO: 0.13 10*3/MM3 (ref 0–0.4)
EOSINOPHIL NFR BLD AUTO: 1.5 % (ref 0.3–6.2)
ERYTHROCYTE [DISTWIDTH] IN BLOOD BY AUTOMATED COUNT: 12.2 % (ref 12.3–15.4)
EXPIRATION DATE: NORMAL
FECAL OCCULT BLOOD SCREEN, POC: NEGATIVE
GLOBULIN UR ELPH-MCNC: 3.1 GM/DL
GLUCOSE SERPL-MCNC: 129 MG/DL (ref 65–99)
GLUCOSE UR STRIP-MCNC: NEGATIVE MG/DL
HCT VFR BLD AUTO: 39.7 % (ref 34–46.6)
HGB BLD-MCNC: 13.4 G/DL (ref 12–15.9)
HGB UR QL STRIP.AUTO: NEGATIVE
HYALINE CASTS UR QL AUTO: ABNORMAL /LPF
IMM GRANULOCYTES # BLD AUTO: 0.02 10*3/MM3 (ref 0–0.05)
IMM GRANULOCYTES NFR BLD AUTO: 0.2 % (ref 0–0.5)
KETONES UR QL STRIP: NEGATIVE
LEUKOCYTE ESTERASE UR QL STRIP.AUTO: ABNORMAL
LIPASE SERPL-CCNC: 33 U/L (ref 13–60)
LYMPHOCYTES # BLD AUTO: 1.6 10*3/MM3 (ref 0.7–3.1)
LYMPHOCYTES NFR BLD AUTO: 18.3 % (ref 19.6–45.3)
Lab: 190
MCH RBC QN AUTO: 30.5 PG (ref 26.6–33)
MCHC RBC AUTO-ENTMCNC: 33.8 G/DL (ref 31.5–35.7)
MCV RBC AUTO: 90.2 FL (ref 79–97)
MONOCYTES # BLD AUTO: 0.55 10*3/MM3 (ref 0.1–0.9)
MONOCYTES NFR BLD AUTO: 6.3 % (ref 5–12)
NEGATIVE CONTROL: NEGATIVE
NEUTROPHILS NFR BLD AUTO: 6.42 10*3/MM3 (ref 1.7–7)
NEUTROPHILS NFR BLD AUTO: 73.5 % (ref 42.7–76)
NITRITE UR QL STRIP: POSITIVE
NRBC BLD AUTO-RTO: 0 /100 WBC (ref 0–0.2)
PH UR STRIP.AUTO: 5.5 [PH] (ref 5–8)
PLATELET # BLD AUTO: 191 10*3/MM3 (ref 140–450)
PMV BLD AUTO: 11.7 FL (ref 6–12)
POSITIVE CONTROL: POSITIVE
POTASSIUM SERPL-SCNC: 4.3 MMOL/L (ref 3.5–5.2)
PROCALCITONIN SERPL-MCNC: 0.03 NG/ML (ref 0–0.25)
PROT SERPL-MCNC: 7.1 G/DL (ref 6–8.5)
PROT UR QL STRIP: NEGATIVE
RBC # BLD AUTO: 4.4 10*6/MM3 (ref 3.77–5.28)
RBC # UR STRIP: ABNORMAL /HPF
REF LAB TEST METHOD: ABNORMAL
SARS-COV-2 RNA PNL SPEC NAA+PROBE: NOT DETECTED
SODIUM SERPL-SCNC: 135 MMOL/L (ref 136–145)
SP GR UR STRIP: 1.01 (ref 1–1.03)
SQUAMOUS #/AREA URNS HPF: ABNORMAL /HPF
UROBILINOGEN UR QL STRIP: ABNORMAL
WBC # UR STRIP: ABNORMAL /HPF
WBC NRBC COR # BLD: 8.74 10*3/MM3 (ref 3.4–10.8)

## 2022-07-04 PROCEDURE — 85025 COMPLETE CBC W/AUTO DIFF WBC: CPT | Performed by: PHYSICIAN ASSISTANT

## 2022-07-04 PROCEDURE — 87635 SARS-COV-2 COVID-19 AMP PRB: CPT | Performed by: PHYSICIAN ASSISTANT

## 2022-07-04 PROCEDURE — 83690 ASSAY OF LIPASE: CPT | Performed by: PHYSICIAN ASSISTANT

## 2022-07-04 PROCEDURE — 83605 ASSAY OF LACTIC ACID: CPT | Performed by: PHYSICIAN ASSISTANT

## 2022-07-04 PROCEDURE — 25010000002 CEFTRIAXONE PER 250 MG: Performed by: PHYSICIAN ASSISTANT

## 2022-07-04 PROCEDURE — 74176 CT ABD & PELVIS W/O CONTRAST: CPT

## 2022-07-04 PROCEDURE — 36415 COLL VENOUS BLD VENIPUNCTURE: CPT

## 2022-07-04 PROCEDURE — 87086 URINE CULTURE/COLONY COUNT: CPT | Performed by: PHYSICIAN ASSISTANT

## 2022-07-04 PROCEDURE — 84145 PROCALCITONIN (PCT): CPT | Performed by: PHYSICIAN ASSISTANT

## 2022-07-04 PROCEDURE — 87040 BLOOD CULTURE FOR BACTERIA: CPT | Performed by: PHYSICIAN ASSISTANT

## 2022-07-04 PROCEDURE — 99283 EMERGENCY DEPT VISIT LOW MDM: CPT

## 2022-07-04 PROCEDURE — 80053 COMPREHEN METABOLIC PANEL: CPT | Performed by: PHYSICIAN ASSISTANT

## 2022-07-04 PROCEDURE — 82270 OCCULT BLOOD FECES: CPT | Performed by: PHYSICIAN ASSISTANT

## 2022-07-04 PROCEDURE — P9612 CATHETERIZE FOR URINE SPEC: HCPCS

## 2022-07-04 PROCEDURE — 87077 CULTURE AEROBIC IDENTIFY: CPT | Performed by: PHYSICIAN ASSISTANT

## 2022-07-04 PROCEDURE — 87186 SC STD MICRODIL/AGAR DIL: CPT | Performed by: PHYSICIAN ASSISTANT

## 2022-07-04 PROCEDURE — 81001 URINALYSIS AUTO W/SCOPE: CPT | Performed by: PHYSICIAN ASSISTANT

## 2022-07-04 PROCEDURE — 96365 THER/PROPH/DIAG IV INF INIT: CPT

## 2022-07-04 RX ORDER — CEFDINIR 300 MG/1
300 CAPSULE ORAL 2 TIMES DAILY
Qty: 10 CAPSULE | Refills: 0 | Status: SHIPPED | OUTPATIENT
Start: 2022-07-04 | End: 2022-07-15 | Stop reason: HOSPADM

## 2022-07-04 RX ADMIN — SODIUM CHLORIDE 500 ML: 9 INJECTION, SOLUTION INTRAVENOUS at 11:46

## 2022-07-04 RX ADMIN — CEFTRIAXONE 2 G: 2 INJECTION, POWDER, FOR SOLUTION INTRAMUSCULAR; INTRAVENOUS at 12:58

## 2022-07-04 NOTE — ED TRIAGE NOTES
Has had abd pain since 6/23 and has had diarrhea.  She reports her stool is black and sticky    Patient was placed in face mask during first look triage.  Patient was wearing a face mask throughout encounter.  I wore personal protective equipment throughout the encounter.  Hand hygiene was performed before and after patient encounter.

## 2022-07-04 NOTE — ED PROVIDER NOTES
"EMERGENCY DEPARTMENT ENCOUNTER    Room Number: 07/07  Date seen: 7/4/2022  Time seen: 10:40 EDT  PCP: Majo Reynolds NP    Spoken Language:  English  Language interpretation services not needed     CHIEF COMPLAINT: abdominal pain    HPI: Sirisha Auguste is a 86 y.o. female with PMH of HTN, HLD presenting to the ED for evaluation of abdominal pain. The history is being obtained by the patient, her daughter and by review of the medical chart.  The patient presents complaining of \"cramping\" abdominal pain which onset 11 days ago.  She states that she initially felt as though she was going to have diarrhea, so she took some Imodium.  However, after having diarrhea, she then states that she has taken MiraLAX for the past 2 days.  The patient states that she continues to have cramping abdominal pain.  She states that she has had decreased p.o. intake because she generally has not felt well.  She rates the abdominal pain as 4/10 in severity.  It is intermittent.  She denies any specific aggravating or relieving factors.  There is no consistent correlation with food.  The patient also states that she may be having dark and \"sticky\" stools.    MEDICAL RECORD REVIEW:  Reviewed in [facility].     PAST MEDICAL HISTORY  Past Medical History:   Diagnosis Date   • Arthritis    • Breast cyst    • GERD (gastroesophageal reflux disease)    • Hyperglycemia    • Hyperlipidemia    • Hypertension    • Migraines    • Murmur    • RBBB        PAST SURGICAL HISTORY  Past Surgical History:   Procedure Laterality Date   • BLADDER SURGERY     • BREAST CYST ASPIRATION     • HYSTERECTOMY     • OOPHORECTOMY         FAMILY HISTORY  Family History   Problem Relation Age of Onset   • Cancer Sister         breast   • Breast cancer Sister    • Cancer Maternal Aunt         breast   • Breast cancer Maternal Aunt        SOCIAL HISTORY  Social History     Socioeconomic History   • Marital status:    Tobacco Use   • Smoking status: Former Smoker "   • Smokeless tobacco: Never Used   • Tobacco comment: When she was 18   Substance and Sexual Activity   • Alcohol use: Never   • Drug use: No   • Sexual activity: Not Currently       CURRENT MEDICATIONS  Prior to Admission medications    Medication Sig Start Date End Date Taking? Authorizing Provider   ALPRAZolam (XANAX) 0.5 MG tablet TAKE 1 TABLET AT NIGHT AS NEEDED FOR ANXIETY 11/15/21   Calos Patel MD   aspirin 81 MG EC tablet Take 81 mg by mouth Daily.    Deric Agrawal MD   diclofenac (VOLTAREN) 1 % gel gel Apply 4 g topically 4 (Four) Times a Day As Needed.    Deric Agrawal MD   esomeprazole (nexIUM) 40 MG capsule TAKE 1 CAPSULE EVERY MORNING BEFORE BREAKFAST 4/26/21   Calos Patel MD   estradiol (ESTRACE) 0.1 MG/GM vaginal cream Insert 2 g into the vagina Daily.    Deric Agrawal MD   ezetimibe-simvastatin (VYTORIN) 10-20 MG per tablet TAKE 1 TABLET EVERY NIGHT 6/28/21   Calos Patel MD   meclizine (ANTIVERT) 25 MG tablet Take 1 tablet by mouth 3 (Three) Times a Day As Needed for dizziness. 7/11/17   Rafat Cline MD   metoprolol succinate XL (TOPROL-XL) 50 MG 24 hr tablet TAKE 1 TABLET DAILY 5/28/21   Calos Patel MD   montelukast (SINGULAIR) 10 MG tablet TAKE 1 TABLET EVERY NIGHT 4/16/21   Calos Patel MD   Multiple Minerals-Vitamins (ADVANCED CALCIUM/D/MAGNESIUM) tablet Take  by mouth. 11/14/13   Deric Agrawal MD   Probiotic Product (PROBIOTIC PO) Take  by mouth Every Other Day.    Deric Agrawal MD   promethazine-codeine (PHENERGAN with CODEINE) 6.25-10 MG/5ML syrup Take 5 mL by mouth Every 4 (Four) Hours As Needed for Cough. 11/22/19   Calos Patel MD   SUMAtriptan (IMITREX) 100 MG tablet TAKE 1 TABLET AT ONSET OF HEADACHE. MAY REPEAT DOSE ONE TIME IN 2 HOURS IF HEADACHE NOT RELIEVED 5/28/21   Calos Patel MD   triamterene-hydrochlorothiazide (MAXZIDE-25) 37.5-25 MG per tablet TAKE 1 TABLET DAILY  Patient taking differently:  TAKE 1/2 TABLET DAILY 5/22/18   Rafat Cline MD       ALLERGIES  Sulfa antibiotics    REVIEW OF SYSTEMS  All systems reviewed and negative except for those discussed in HPI.     PHYSICAL EXAM  ED Triage Vitals   Temp Heart Rate Resp BP SpO2   07/04/22 1023 07/04/22 1023 07/04/22 1023 07/04/22 1031 07/04/22 1023   98.2 °F (36.8 °C) 79 16 (!) 154/101 94 %      Temp src Heart Rate Source Patient Position BP Location FiO2 (%)   07/04/22 1023 07/04/22 1023 -- -- --   Tympanic Monitor          Physical Exam  Constitutional:       Appearance: Normal appearance.   HENT:      Head: Normocephalic and atraumatic.      Mouth/Throat:      Mouth: Mucous membranes are moist.   Eyes:      Extraocular Movements: Extraocular movements intact.      Pupils: Pupils are equal, round, and reactive to light.   Cardiovascular:      Rate and Rhythm: Normal rate and regular rhythm.   Pulmonary:      Effort: Pulmonary effort is normal.      Breath sounds: Normal breath sounds.   Abdominal:      General: There is no distension.      Palpations: Abdomen is soft.      Tenderness: There is abdominal tenderness in the left lower quadrant.   Genitourinary:     Rectum: Mass (Small palpable mass palpated during digital rectal exam) present.   Musculoskeletal:      Cervical back: Normal range of motion.   Skin:     General: Skin is warm and dry.   Neurological:      General: No focal deficit present.      Mental Status: She is alert and oriented to person, place, and time.   Psychiatric:         Mood and Affect: Mood normal.         Behavior: Behavior normal.         Thought Content: Thought content normal.         Judgment: Judgment normal.         PROCEDURES  Procedures  None    LABS  Recent Results (from the past 24 hour(s))   Comprehensive Metabolic Panel    Collection Time: 07/04/22 11:45 AM    Specimen: Blood   Result Value Ref Range    Glucose 129 (H) 65 - 99 mg/dL    BUN 18 8 - 23 mg/dL    Creatinine 0.85 0.57 - 1.00 mg/dL    Sodium 135  (L) 136 - 145 mmol/L    Potassium 4.3 3.5 - 5.2 mmol/L    Chloride 104 98 - 107 mmol/L    CO2 23.3 22.0 - 29.0 mmol/L    Calcium 10.1 8.6 - 10.5 mg/dL    Total Protein 7.1 6.0 - 8.5 g/dL    Albumin 4.00 3.50 - 5.20 g/dL    ALT (SGPT) 11 1 - 33 U/L    AST (SGOT) 20 1 - 32 U/L    Alkaline Phosphatase 66 39 - 117 U/L    Total Bilirubin 0.9 0.0 - 1.2 mg/dL    Globulin 3.1 gm/dL    A/G Ratio 1.3 g/dL    BUN/Creatinine Ratio 21.2 7.0 - 25.0    Anion Gap 7.7 5.0 - 15.0 mmol/L    eGFR 66.8 >60.0 mL/min/1.73   Lipase    Collection Time: 07/04/22 11:45 AM    Specimen: Blood   Result Value Ref Range    Lipase 33 13 - 60 U/L   Lactic Acid, Plasma    Collection Time: 07/04/22 11:45 AM    Specimen: Blood   Result Value Ref Range    Lactate 1.1 0.5 - 2.0 mmol/L   Procalcitonin    Collection Time: 07/04/22 11:45 AM    Specimen: Blood   Result Value Ref Range    Procalcitonin 0.03 0.00 - 0.25 ng/mL   CBC Auto Differential    Collection Time: 07/04/22 11:45 AM    Specimen: Blood   Result Value Ref Range    WBC 8.74 3.40 - 10.80 10*3/mm3    RBC 4.40 3.77 - 5.28 10*6/mm3    Hemoglobin 13.4 12.0 - 15.9 g/dL    Hematocrit 39.7 34.0 - 46.6 %    MCV 90.2 79.0 - 97.0 fL    MCH 30.5 26.6 - 33.0 pg    MCHC 33.8 31.5 - 35.7 g/dL    RDW 12.2 (L) 12.3 - 15.4 %    RDW-SD 39.8 37.0 - 54.0 fl    MPV 11.7 6.0 - 12.0 fL    Platelets 191 140 - 450 10*3/mm3    Neutrophil % 73.5 42.7 - 76.0 %    Lymphocyte % 18.3 (L) 19.6 - 45.3 %    Monocyte % 6.3 5.0 - 12.0 %    Eosinophil % 1.5 0.3 - 6.2 %    Basophil % 0.2 0.0 - 1.5 %    Immature Grans % 0.2 0.0 - 0.5 %    Neutrophils, Absolute 6.42 1.70 - 7.00 10*3/mm3    Lymphocytes, Absolute 1.60 0.70 - 3.10 10*3/mm3    Monocytes, Absolute 0.55 0.10 - 0.90 10*3/mm3    Eosinophils, Absolute 0.13 0.00 - 0.40 10*3/mm3    Basophils, Absolute 0.02 0.00 - 0.20 10*3/mm3    Immature Grans, Absolute 0.02 0.00 - 0.05 10*3/mm3    nRBC 0.0 0.0 - 0.2 /100 WBC   COVID-19,[facility] IN-HOUSE CEPHEID/RANDALL NP SWAB IN TRANSPORT MEDIA  8-12 HR TAT - Swab, Nasopharynx    Collection Time: 07/04/22 11:48 AM    Specimen: Nasopharynx; Swab   Result Value Ref Range    COVID19 Not Detected Not Detected - Ref. Range   Urinalysis With Microscopic If Indicated (No Culture) - Urine, Catheter    Collection Time: 07/04/22 11:54 AM    Specimen: Urine, Catheter   Result Value Ref Range    Color, UA Yellow Yellow, Straw    Appearance, UA Cloudy (A) Clear    pH, UA 5.5 5.0 - 8.0    Specific Gravity, UA 1.011 1.005 - 1.030    Glucose, UA Negative Negative    Ketones, UA Negative Negative    Bilirubin, UA Negative Negative    Blood, UA Negative Negative    Protein, UA Negative Negative    Leuk Esterase, UA Moderate (2+) (A) Negative    Nitrite, UA Positive (A) Negative    Urobilinogen, UA 1.0 E.U./dL 0.2 - 1.0 E.U./dL   Urinalysis, Microscopic Only - Urine, Catheter    Collection Time: 07/04/22 11:54 AM    Specimen: Urine, Catheter   Result Value Ref Range    RBC, UA 0-2 None Seen, 0-2 /HPF    WBC, UA 31-50 (A) None Seen, 0-2 /HPF    Bacteria, UA 4+ (A) None Seen /HPF    Squamous Epithelial Cells, UA 0-2 None Seen, 0-2 /HPF    Hyaline Casts, UA 0-2 None Seen /LPF    Methodology Automated Microscopy    POCT Occult Blood Stool    Collection Time: 07/04/22  4:42 PM    Specimen: Per Rectum; Stool   Result Value Ref Range    Fecal Occult Blood Negative Negative    Lot Number [id number]     Expiration Date 12/31/2022     Positive Control Positive Positive    Negative Control Negative Negative       RADIOLOGY  CT Abdomen Pelvis Without Contrast   Final Result       Moderate slightly irregular sigmoid colonic wall thickening with mild   surrounding fat stranding and trace free pelvic fluid, which raises   concern for a nonspecific focal colitis in the appropriate clinical   setting. The appearance also raises concern for a possible underlying   lesion. Follow-up colonoscopy is recommended upon resolution of the   acute inflammatory process.       Discussed with Dr. Astudillo at 1:10  PM.       This report was finalized on 7/4/2022 1:10 PM by Dr. Rebekah Barlow M.D.              MEDICATIONS GIVEN IN THE ER  Medications   sodium chloride 0.9 % bolus 500 mL (0 mL Intravenous Stopped 7/4/22 1258)   cefTRIAXone (ROCEPHIN) 2 g in sodium chloride 0.9 % 100 mL IVPB-VTB (0 g Intravenous Stopped 7/4/22 1354)       MEDICAL DECISION MAKING, CONSULTS AND PROGRESS NOTES  All labs and all radiology studies were have been viewed and interpreted by me.   Discussion below represents my analysis of pertinent findings related to patient's condition, differential diagnosis, treatment plan and final disposition.    Differential diagnosis includes but is not limited to:  - Hepatobiliary pathology such as cholecystitis, cholangitis, and symptomatic cholelithiasis  - Pancreatitis  - Dyspepsia  - Small bowel obstruction  - Appendicitis  - Diverticulitis  - UTI including pyelonephritis  - Ureteral stone  - Zoster  - Colitis, including infectious and ischemic  - Atypical ACS  - colon cancer  - internal hemorrhoid  - polyp    PPE: The patient was placed in a face mask in first look. Patient was wearing facemask when I entered the room and throughout our encounter. I wore full protective equipment throughout this patient encounter including a face mask, eye protection and gloves. Hand hygiene was performed before donning protective equipment and after removal when leaving the room.    AS OF 16:43 EDT VITALS:    BP - 150/78  HR - 55  TEMP - 98.2 °F (36.8 °C) (Tympanic)  O2 SATS - 94%         I had a lengthy discussion with the patient daughter regarding the patient's CT findings and her need for close GI follow-up for a colonoscopy.  I offered admission for this evaluation, however they would prefer to be discharged home and do this on an outpatient basis.  An internal GI referral was placed.    The patient's history, physical exam, and lab findings were discussed with the physician, who also performed a face to face history  and physical exam.  I discussed all results and noted any abnormalities with patient.  Discussed absoute need to recheck abnormalities with their family physician.  I answered any of the patient's questions.  Discussed plan for discharge, as there is no emergent indication for admission.  Pt is agreeable and understands need for follow up and repeat testing.  Pt is aware that discharge does not mean that nothing is wrong but it indicates no emergency is present and they must continue care with their family physician.  Pt is discharged with instructions to follow up with primary care doctor to have their blood pressure rechecked.     DIAGNOSIS   Diagnosis Plan   1. Acute UTI     2. Abnormal CT of the abdomen     3. Mass of colon  Ambulatory Referral to Gastroenterology       DISPOSITION  ED Disposition     ED Disposition   Discharge    Condition   Stable    Comment   --             FOLLOW UP  Majo Reynolds, NP  [address] [facility] [address]  [place] [id number] [phone]    Schedule an appointment as soon as possible for a visit         RX  Medications   sodium chloride 0.9 % bolus 500 mL (0 mL Intravenous Stopped 7/4/22 1258)   cefTRIAXone (ROCEPHIN) 2 g in sodium chloride 0.9 % 100 mL IVPB-VTB (0 g Intravenous Stopped 7/4/22 1354)          Medication List      New Prescriptions    cefdinir 300 MG capsule  Commonly known as: OMNICEF  Take 1 capsule by mouth 2 (Two) Times a Day.        Changed    triamterene-hydrochlorothiazide 37.5-25 MG per tablet  Commonly known as: MAXZIDE-25  TAKE 1 TABLET DAILY  What changed:   · how much to take  · how to take this  · when to take this           Where to Get Your Medications      These medications were sent to [id number] - [place], [place] - [address] [facility] AT [facility] [phone] [facility] [phone] [address]  [address] [id number]    Phone: [phone]   · cefdinir 300 MG capsule       Provider Attestation:  I  personally reviewed the past medical history, past surgical history, social history, family history, current medications and allergies as they appear in the chart. I reviewed the patient's history, physical, lab/imaging results and overall care with Dr. Astudillo who is in agreement with the patient's treatment plan.    EMR Dragon/Transcription disclaimer:  Dictated using Dragon dictation    Provider note signed by:         Barbra Kellogg PA  07/04/22 6642

## 2022-07-04 NOTE — ED PROVIDER NOTES
MD ATTESTATION NOTE    The KAILA and I have discussed this patient's history, physical exam, and treatment plan.  I have reviewed the documentation and personally had a face to face interaction with the patient. I affirm the documentation and agree with the treatment and plan.  The attached note describes my personal findings.      I provided a substantive portion of the care of the patient.  I personally performed the physical exam in its entirety, and below are my findings.  For this patient encounter, the patient wore surgical mask, I wore full protective PPE including N95 and eye protection.      Brief HPI: Patient presenting with complaint of abdominal pain.    PHYSICAL EXAM  ED Triage Vitals   Temp Heart Rate Resp BP SpO2   07/04/22 1023 07/04/22 1023 07/04/22 1023 07/04/22 1031 07/04/22 1023   98.2 °F (36.8 °C) 79 16 (!) 154/101 94 %      Temp src Heart Rate Source Patient Position BP Location FiO2 (%)   07/04/22 1023 07/04/22 1023 -- -- --   Tympanic Monitor            GENERAL: Awake and alert, no acute distress  HENT: nares patent  EYES: no scleral icterus, EOMI  CV: regular rhythm, normal rate  RESPIRATORY: normal effort  ABDOMEN: soft, mild left lower quadrant tenderness without rebound or guarding  MUSCULOSKELETAL: no deformity  NEURO: alert, moves all extremities, follows commands, speech fluent and clear  PSYCH:  calm, cooperative  SKIN: warm, dry    Vital signs and nursing notes reviewed.        Plan: Labs appear consistent with possible urinary tract infection.  CT abdomen ordered for further evaluation.  Patient declined the contrast because she reports that once previously IV contrast causes her to have a migraine for 3 days.     Darnell Astudillo MD  07/04/22 0747

## 2022-07-04 NOTE — DISCHARGE INSTRUCTIONS
Although you are being discharged from the ED today, I encourage you to return for worsening symptoms. Things can, and do, change such that treatment at home with medication may not be adequate. Specifically I recommend returning for chest pain or discomfort, difficulty breathing, persistent vomiting or difficulty holding down liquids or medications, fever > 102.0 F or any other worsening or alarming symptoms.     Rest. Drink plenty of fluids.  Follow up with gastroenterology for further evaluation and management. You will need to have a colonoscopy performed.  Follow up with primary care provider for further management and to have blood pressure rechecked.  Take your medications as prescribed.

## 2022-07-06 LAB — BACTERIA SPEC AEROBE CULT: ABNORMAL

## 2022-07-09 LAB
BACTERIA SPEC AEROBE CULT: NORMAL
BACTERIA SPEC AEROBE CULT: NORMAL

## 2022-07-10 ENCOUNTER — HOSPITAL ENCOUNTER (INPATIENT)
Facility: HOSPITAL | Age: 87
LOS: 2 days | Discharge: HOME HEALTH | End: 2022-07-15
Attending: EMERGENCY MEDICINE | Admitting: INTERNAL MEDICINE

## 2022-07-10 ENCOUNTER — APPOINTMENT (OUTPATIENT)
Dept: CT IMAGING | Facility: HOSPITAL | Age: 87
End: 2022-07-10

## 2022-07-10 DIAGNOSIS — E86.0 DEHYDRATION: ICD-10-CM

## 2022-07-10 DIAGNOSIS — K52.9 COLITIS: Primary | ICD-10-CM

## 2022-07-10 DIAGNOSIS — R11.2 NAUSEA AND VOMITING, UNSPECIFIED VOMITING TYPE: ICD-10-CM

## 2022-07-10 DIAGNOSIS — R93.5 ABNORMAL CT OF THE ABDOMEN: ICD-10-CM

## 2022-07-10 DIAGNOSIS — A04.4 E. COLI COLITIS: ICD-10-CM

## 2022-07-10 DIAGNOSIS — K92.1 BLACK STOOL: ICD-10-CM

## 2022-07-10 DIAGNOSIS — R10.30 LOWER ABDOMINAL PAIN: ICD-10-CM

## 2022-07-10 PROBLEM — N39.0 UTI (URINARY TRACT INFECTION): Status: ACTIVE | Noted: 2022-07-10

## 2022-07-10 PROBLEM — R10.9 ABDOMINAL PAIN: Status: ACTIVE | Noted: 2022-07-10

## 2022-07-10 LAB
ALBUMIN SERPL-MCNC: 4.5 G/DL (ref 3.5–5.2)
ALBUMIN/GLOB SERPL: 1.5 G/DL
ALP SERPL-CCNC: 87 U/L (ref 39–117)
ALT SERPL W P-5'-P-CCNC: 9 U/L (ref 1–33)
ANION GAP SERPL CALCULATED.3IONS-SCNC: 12 MMOL/L (ref 5–15)
AST SERPL-CCNC: 19 U/L (ref 1–32)
BACTERIA UR QL AUTO: ABNORMAL /HPF
BASOPHILS # BLD AUTO: 0.02 10*3/MM3 (ref 0–0.2)
BASOPHILS NFR BLD AUTO: 0.2 % (ref 0–1.5)
BILIRUB SERPL-MCNC: 0.8 MG/DL (ref 0–1.2)
BILIRUB UR QL STRIP: NEGATIVE
BUN SERPL-MCNC: 13 MG/DL (ref 8–23)
BUN/CREAT SERPL: 14.9 (ref 7–25)
CALCIUM SPEC-SCNC: 10.7 MG/DL (ref 8.6–10.5)
CHLORIDE SERPL-SCNC: 98 MMOL/L (ref 98–107)
CLARITY UR: ABNORMAL
CO2 SERPL-SCNC: 26 MMOL/L (ref 22–29)
COLOR UR: YELLOW
CREAT SERPL-MCNC: 0.87 MG/DL (ref 0.57–1)
D-LACTATE SERPL-SCNC: 1.8 MMOL/L (ref 0.5–2)
DEPRECATED RDW RBC AUTO: 39.1 FL (ref 37–54)
EGFRCR SERPLBLD CKD-EPI 2021: 65 ML/MIN/1.73
EOSINOPHIL # BLD AUTO: 0.09 10*3/MM3 (ref 0–0.4)
EOSINOPHIL NFR BLD AUTO: 0.9 % (ref 0.3–6.2)
ERYTHROCYTE [DISTWIDTH] IN BLOOD BY AUTOMATED COUNT: 12.1 % (ref 12.3–15.4)
GLOBULIN UR ELPH-MCNC: 3.1 GM/DL
GLUCOSE SERPL-MCNC: 115 MG/DL (ref 65–99)
GLUCOSE UR STRIP-MCNC: NEGATIVE MG/DL
HCT VFR BLD AUTO: 41.6 % (ref 34–46.6)
HGB BLD-MCNC: 14.2 G/DL (ref 12–15.9)
HGB UR QL STRIP.AUTO: ABNORMAL
HOLD SPECIMEN: NORMAL
HOLD SPECIMEN: NORMAL
HYALINE CASTS UR QL AUTO: ABNORMAL /LPF
IMM GRANULOCYTES # BLD AUTO: 0.03 10*3/MM3 (ref 0–0.05)
IMM GRANULOCYTES NFR BLD AUTO: 0.3 % (ref 0–0.5)
KETONES UR QL STRIP: ABNORMAL
LEUKOCYTE ESTERASE UR QL STRIP.AUTO: ABNORMAL
LIPASE SERPL-CCNC: 33 U/L (ref 13–60)
LYMPHOCYTES # BLD AUTO: 1.23 10*3/MM3 (ref 0.7–3.1)
LYMPHOCYTES NFR BLD AUTO: 11.7 % (ref 19.6–45.3)
MCH RBC QN AUTO: 30.7 PG (ref 26.6–33)
MCHC RBC AUTO-ENTMCNC: 34.1 G/DL (ref 31.5–35.7)
MCV RBC AUTO: 90 FL (ref 79–97)
MONOCYTES # BLD AUTO: 0.44 10*3/MM3 (ref 0.1–0.9)
MONOCYTES NFR BLD AUTO: 4.2 % (ref 5–12)
NEUTROPHILS NFR BLD AUTO: 8.67 10*3/MM3 (ref 1.7–7)
NEUTROPHILS NFR BLD AUTO: 82.7 % (ref 42.7–76)
NITRITE UR QL STRIP: NEGATIVE
NRBC BLD AUTO-RTO: 0 /100 WBC (ref 0–0.2)
PH UR STRIP.AUTO: 6 [PH] (ref 5–8)
PLATELET # BLD AUTO: 234 10*3/MM3 (ref 140–450)
PMV BLD AUTO: 11.1 FL (ref 6–12)
POTASSIUM SERPL-SCNC: 4 MMOL/L (ref 3.5–5.2)
PROT SERPL-MCNC: 7.6 G/DL (ref 6–8.5)
PROT UR QL STRIP: ABNORMAL
RBC # BLD AUTO: 4.62 10*6/MM3 (ref 3.77–5.28)
RBC # UR STRIP: ABNORMAL /HPF
REF LAB TEST METHOD: ABNORMAL
SARS-COV-2 RNA PNL SPEC NAA+PROBE: NOT DETECTED
SODIUM SERPL-SCNC: 136 MMOL/L (ref 136–145)
SP GR UR STRIP: 1.02 (ref 1–1.03)
SQUAMOUS #/AREA URNS HPF: ABNORMAL /HPF
UROBILINOGEN UR QL STRIP: ABNORMAL
WBC # UR STRIP: ABNORMAL /HPF
WBC NRBC COR # BLD: 10.48 10*3/MM3 (ref 3.4–10.8)
WHOLE BLOOD HOLD COAG: NORMAL
WHOLE BLOOD HOLD SPECIMEN: NORMAL

## 2022-07-10 PROCEDURE — 25010000002 ONDANSETRON PER 1 MG: Performed by: EMERGENCY MEDICINE

## 2022-07-10 PROCEDURE — 99284 EMERGENCY DEPT VISIT MOD MDM: CPT

## 2022-07-10 PROCEDURE — 87076 CULTURE ANAEROBE IDENT EACH: CPT

## 2022-07-10 PROCEDURE — 25010000002 AMPICILLIN-SULBACTAM PER 1.5 G: Performed by: EMERGENCY MEDICINE

## 2022-07-10 PROCEDURE — 87153 DNA/RNA SEQUENCING: CPT

## 2022-07-10 PROCEDURE — 25010000002 HYDROMORPHONE PER 4 MG: Performed by: EMERGENCY MEDICINE

## 2022-07-10 PROCEDURE — 83690 ASSAY OF LIPASE: CPT

## 2022-07-10 PROCEDURE — 87040 BLOOD CULTURE FOR BACTERIA: CPT | Performed by: EMERGENCY MEDICINE

## 2022-07-10 PROCEDURE — 36415 COLL VENOUS BLD VENIPUNCTURE: CPT | Performed by: EMERGENCY MEDICINE

## 2022-07-10 PROCEDURE — 81001 URINALYSIS AUTO W/SCOPE: CPT

## 2022-07-10 PROCEDURE — 80053 COMPREHEN METABOLIC PANEL: CPT

## 2022-07-10 PROCEDURE — 25010000002 METOCLOPRAMIDE PER 10 MG: Performed by: EMERGENCY MEDICINE

## 2022-07-10 PROCEDURE — G0378 HOSPITAL OBSERVATION PER HR: HCPCS

## 2022-07-10 PROCEDURE — 74177 CT ABD & PELVIS W/CONTRAST: CPT

## 2022-07-10 PROCEDURE — 87635 SARS-COV-2 COVID-19 AMP PRB: CPT | Performed by: EMERGENCY MEDICINE

## 2022-07-10 PROCEDURE — 87186 SC STD MICRODIL/AGAR DIL: CPT

## 2022-07-10 PROCEDURE — 25010000002 IOPAMIDOL 61 % SOLUTION: Performed by: PHYSICIAN ASSISTANT

## 2022-07-10 PROCEDURE — 87150 DNA/RNA AMPLIFIED PROBE: CPT | Performed by: EMERGENCY MEDICINE

## 2022-07-10 PROCEDURE — 83605 ASSAY OF LACTIC ACID: CPT | Performed by: EMERGENCY MEDICINE

## 2022-07-10 PROCEDURE — 25010000002 ONDANSETRON PER 1 MG: Performed by: INTERNAL MEDICINE

## 2022-07-10 PROCEDURE — 85025 COMPLETE CBC W/AUTO DIFF WBC: CPT

## 2022-07-10 RX ORDER — SODIUM CHLORIDE 0.9 % (FLUSH) 0.9 %
10 SYRINGE (ML) INJECTION EVERY 12 HOURS SCHEDULED
Status: DISCONTINUED | OUTPATIENT
Start: 2022-07-10 | End: 2022-07-15 | Stop reason: HOSPADM

## 2022-07-10 RX ORDER — ACETAMINOPHEN 650 MG/1
650 SUPPOSITORY RECTAL EVERY 4 HOURS PRN
Status: DISCONTINUED | OUTPATIENT
Start: 2022-07-10 | End: 2022-07-15 | Stop reason: HOSPADM

## 2022-07-10 RX ORDER — ONDANSETRON 2 MG/ML
4 INJECTION INTRAMUSCULAR; INTRAVENOUS EVERY 6 HOURS PRN
Status: DISCONTINUED | OUTPATIENT
Start: 2022-07-10 | End: 2022-07-15 | Stop reason: HOSPADM

## 2022-07-10 RX ORDER — METOPROLOL SUCCINATE 50 MG/1
50 TABLET, EXTENDED RELEASE ORAL DAILY
Status: DISCONTINUED | OUTPATIENT
Start: 2022-07-10 | End: 2022-07-13

## 2022-07-10 RX ORDER — SODIUM CHLORIDE 0.9 % (FLUSH) 0.9 %
10 SYRINGE (ML) INJECTION AS NEEDED
Status: DISCONTINUED | OUTPATIENT
Start: 2022-07-10 | End: 2022-07-15 | Stop reason: HOSPADM

## 2022-07-10 RX ORDER — SODIUM CHLORIDE 9 MG/ML
100 INJECTION, SOLUTION INTRAVENOUS CONTINUOUS
Status: DISCONTINUED | OUTPATIENT
Start: 2022-07-10 | End: 2022-07-11

## 2022-07-10 RX ORDER — ACETAMINOPHEN 325 MG/1
650 TABLET ORAL EVERY 4 HOURS PRN
Status: DISCONTINUED | OUTPATIENT
Start: 2022-07-10 | End: 2022-07-15 | Stop reason: HOSPADM

## 2022-07-10 RX ORDER — TRIAMTERENE AND HYDROCHLOROTHIAZIDE 37.5; 25 MG/1; MG/1
1 TABLET ORAL DAILY
Status: DISCONTINUED | OUTPATIENT
Start: 2022-07-11 | End: 2022-07-13

## 2022-07-10 RX ORDER — L.ACID,PARA/B.BIFIDUM/S.THERM 8B CELL
1 CAPSULE ORAL DAILY
Status: DISCONTINUED | OUTPATIENT
Start: 2022-07-10 | End: 2022-07-15 | Stop reason: HOSPADM

## 2022-07-10 RX ORDER — ACETAMINOPHEN 160 MG/5ML
650 SOLUTION ORAL EVERY 4 HOURS PRN
Status: DISCONTINUED | OUTPATIENT
Start: 2022-07-10 | End: 2022-07-15 | Stop reason: HOSPADM

## 2022-07-10 RX ORDER — MECLIZINE HYDROCHLORIDE 25 MG/1
25 TABLET ORAL 3 TIMES DAILY PRN
Status: DISCONTINUED | OUTPATIENT
Start: 2022-07-10 | End: 2022-07-15 | Stop reason: HOSPADM

## 2022-07-10 RX ORDER — PANTOPRAZOLE SODIUM 40 MG/1
40 TABLET, DELAYED RELEASE ORAL EVERY MORNING
Refills: 3 | Status: DISCONTINUED | OUTPATIENT
Start: 2022-07-11 | End: 2022-07-15 | Stop reason: HOSPADM

## 2022-07-10 RX ORDER — ONDANSETRON 2 MG/ML
4 INJECTION INTRAMUSCULAR; INTRAVENOUS ONCE
Status: COMPLETED | OUTPATIENT
Start: 2022-07-10 | End: 2022-07-10

## 2022-07-10 RX ORDER — MONTELUKAST SODIUM 10 MG/1
10 TABLET ORAL NIGHTLY
Status: DISCONTINUED | OUTPATIENT
Start: 2022-07-10 | End: 2022-07-15 | Stop reason: HOSPADM

## 2022-07-10 RX ORDER — HYDROMORPHONE HYDROCHLORIDE 1 MG/ML
0.5 INJECTION, SOLUTION INTRAMUSCULAR; INTRAVENOUS; SUBCUTANEOUS ONCE
Status: COMPLETED | OUTPATIENT
Start: 2022-07-10 | End: 2022-07-10

## 2022-07-10 RX ORDER — HYDROMORPHONE HYDROCHLORIDE 1 MG/ML
0.5 INJECTION, SOLUTION INTRAMUSCULAR; INTRAVENOUS; SUBCUTANEOUS
Status: DISCONTINUED | OUTPATIENT
Start: 2022-07-10 | End: 2022-07-15 | Stop reason: HOSPADM

## 2022-07-10 RX ORDER — ATORVASTATIN CALCIUM 20 MG/1
10 TABLET, FILM COATED ORAL NIGHTLY
Refills: 3 | Status: DISCONTINUED | OUTPATIENT
Start: 2022-07-10 | End: 2022-07-15 | Stop reason: HOSPADM

## 2022-07-10 RX ORDER — SUMATRIPTAN 100 MG/1
100 TABLET, FILM COATED ORAL
Status: DISCONTINUED | OUTPATIENT
Start: 2022-07-10 | End: 2022-07-15 | Stop reason: HOSPADM

## 2022-07-10 RX ORDER — METOCLOPRAMIDE HYDROCHLORIDE 5 MG/ML
10 INJECTION INTRAMUSCULAR; INTRAVENOUS ONCE
Status: COMPLETED | OUTPATIENT
Start: 2022-07-10 | End: 2022-07-10

## 2022-07-10 RX ADMIN — SUMATRIPTAN SUCCINATE 100 MG: 100 TABLET ORAL at 21:08

## 2022-07-10 RX ADMIN — ATORVASTATIN CALCIUM 10 MG: 20 TABLET, FILM COATED ORAL at 21:09

## 2022-07-10 RX ADMIN — Medication 1 CAPSULE: at 21:09

## 2022-07-10 RX ADMIN — Medication 10 ML: at 21:15

## 2022-07-10 RX ADMIN — METOPROLOL SUCCINATE 50 MG: 50 TABLET, EXTENDED RELEASE ORAL at 21:09

## 2022-07-10 RX ADMIN — ACETAMINOPHEN 650 MG: 325 TABLET, FILM COATED ORAL at 18:03

## 2022-07-10 RX ADMIN — HYDROMORPHONE HYDROCHLORIDE 0.5 MG: 1 INJECTION, SOLUTION INTRAMUSCULAR; INTRAVENOUS; SUBCUTANEOUS at 13:04

## 2022-07-10 RX ADMIN — METOCLOPRAMIDE HYDROCHLORIDE 10 MG: 5 INJECTION INTRAMUSCULAR; INTRAVENOUS at 14:33

## 2022-07-10 RX ADMIN — IOPAMIDOL 85 ML: 612 INJECTION, SOLUTION INTRAVENOUS at 11:25

## 2022-07-10 RX ADMIN — ONDANSETRON 4 MG: 2 INJECTION INTRAMUSCULAR; INTRAVENOUS at 21:10

## 2022-07-10 RX ADMIN — MONTELUKAST SODIUM 10 MG: 10 TABLET, FILM COATED ORAL at 21:10

## 2022-07-10 RX ADMIN — SODIUM CHLORIDE, POTASSIUM CHLORIDE, SODIUM LACTATE AND CALCIUM CHLORIDE 1000 ML: 600; 310; 30; 20 INJECTION, SOLUTION INTRAVENOUS at 13:06

## 2022-07-10 RX ADMIN — ONDANSETRON 4 MG: 2 INJECTION INTRAMUSCULAR; INTRAVENOUS at 13:04

## 2022-07-10 RX ADMIN — AMPICILLIN SODIUM AND SULBACTAM SODIUM 3 G: 2; 1 INJECTION, POWDER, FOR SOLUTION INTRAMUSCULAR; INTRAVENOUS at 15:09

## 2022-07-10 RX ADMIN — SODIUM CHLORIDE 100 ML/HR: 9 INJECTION, SOLUTION INTRAVENOUS at 17:56

## 2022-07-10 NOTE — ED PROVIDER NOTES
" EMERGENCY DEPARTMENT ENCOUNTER    Room Number:  23/23  Date of encounter:  7/10/2022  PCP: Majo Reynolds NP  Historian: Patient  Full history not obtainable due to: None    HPI:  Chief Complaint: Abdominal pain    Context: Sirisha Auguste is a 86 y.o. female with a PMH significant for hyperlipidemia, hypertension, GERD who presents to the ED c/o generalized cramping and aching abdominal pain which is nonradiating.  She states that symptoms have been going on for the past 2 weeks but have gotten much worse in the past couple of days.  She was here in the emergency department 6 days ago for similar symptoms and was diagnosed with a urinary tract infection and a mass to the sigmoid colon.  States that since leaving the hospital she has had difficulty with bowel movements and states that she feels she is constipated.  The only bowel movement she has are \"mushy, sticky, and thick\".  She has not noticed any dark or bloody stools.  She denies fever, chills.  Admits to nausea without vomiting that was present through the night last night.  She denies any recent abdominal surgeries.  She has been on cefdinir for the UTI that she was diagnosed with last week.      MEDICAL RECORD REVIEW:    CT ABDOMEN PELVIS WO CONTRAST-     HISTORY:  Abdominal pain, UTI.      TECHNIQUE:  CT of the abdomen and pelvis was performed without  intravenous contrast.  Evaluation of the solid organs and vasculature is  limited without intravenous contrast.  Radiation dose reduction  techniques were utilized, including automated exposure control and  exposure modulation based on body size.     COMPARISON:  CT chest 10/02/2012     FINDINGS:     The heart is enlarged with predominantly left atrial enlargement. There  are dense mitral calcifications. There is no pericardial effusion. There  is calcific coronary artery atherosclerosis. The ascending aorta is  ectatic to at least 3.9 cm, partially imaged. There are partially imaged  calcified " lymph nodes. There is mild bibasilar atelectasis and/or  scarring. There is calcific granuloma in the left lower lobe. Pleural  spaces are clear.  There is asymmetric elevation of the right hemidiaphragm.  The liver is normal in size. There are calcified hepatic granulomata.  The gallbladder is present. The spleen is normal in size. There are  calcified splenic granulomata. There are no pancreatic calcifications.  The adrenal glands are within normal limits. There are no renal stones  or hydronephrosis.  No pathologically enlarged abdominal or pelvic lymph nodes are  identified. There is advanced calcific aortoiliac atherosclerosis.   There is no bowel obstruction. The appendix is present and normal in  size. There are scattered colonic diverticula. There is moderate  slightly irregular sigmoid colonic wall thickening with mild surrounding  fat stranding and trace free pelvic fluid. There is an at least moderate  right-sided colonic and sigmoid colonic stool burden. The bladder is  moderately distended and within normal limits. The uterus is not seen.  There is no adnexal mass. There is surgical scarring in the anterior  pelvic wall.  There is multilevel degenerative disc disease. There is diffuse osseous  demineralization. There is lumbar levoscoliosis.        IMPRESSION:     Moderate slightly irregular sigmoid colonic wall thickening with mild  surrounding fat stranding and trace free pelvic fluid, which raises  concern for a nonspecific focal colitis in the appropriate clinical  setting. The appearance also raises concern for a possible underlying  lesion. Follow-up colonoscopy is recommended upon resolution of the  acute inflammatory process.     Discussed with Dr. Astudillo at 1:10 PM.     This report was finalized on 7/4/2022 1:10 PM by Dr. Rebekah Barlow M.D.      PAST MEDICAL HISTORY    Active Ambulatory Problems     Diagnosis Date Noted   • Benign essential hypertension 06/09/2016   • Borderline hyperglycemia  06/09/2016   • Gastroesophageal reflux disease 06/09/2016   • Hyperlipidemia 06/09/2016   • Migraine 06/09/2016   • Heart murmur 06/09/2016   • 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   • Acute thoracic back pain 02/09/2017   • Dyspnea on exertion 02/09/2017   • Recurrent vertigo 05/08/2017   • Generalized osteoarthritis of multiple sites 03/20/2018   • Diastolic dysfunction 08/06/2018   • Chronic fatigue 08/06/2018   • Mild concentric left ventricular hypertrophy (LVH) 08/23/2018   • Hypercalcemia 12/04/2018   • Hematoma 06/03/2019   • Generalized anxiety disorder 12/01/2019   • Chronic migraine without aura without status migrainosus, not intractable 05/26/2020   • RBBB      Resolved Ambulatory Problems     Diagnosis Date Noted   • Renal insufficiency 06/09/2016   • Herpes zoster 06/09/2016   • Rash 08/11/2017     Past Medical History:   Diagnosis Date   • Arthritis    • Breast cyst    • GERD (gastroesophageal reflux disease)    • Hyperglycemia    • Hypertension    • Migraines    • Murmur          PAST SURGICAL HISTORY  Past Surgical History:   Procedure Laterality Date   • BLADDER SURGERY     • BREAST CYST ASPIRATION     • HYSTERECTOMY     • OOPHORECTOMY           FAMILY HISTORY  Family History   Problem Relation Age of Onset   • Cancer Sister         breast   • Breast cancer Sister    • Cancer Maternal Aunt         breast   • Breast cancer Maternal Aunt          SOCIAL HISTORY  Social History     Socioeconomic History   • Marital status:    Tobacco Use   • Smoking status: Former Smoker   • Smokeless tobacco: Never Used   • Tobacco comment: When she was 18   Substance and Sexual Activity   • Alcohol use: Never   • Drug use: No   • Sexual activity: Not Currently         ALLERGIES  Sulfa antibiotics        REVIEW OF SYSTEMS  Review of Systems   Constitutional: Negative for chills and fever.   HENT: Negative for congestion.    Eyes: Negative for visual disturbance.   Respiratory: Negative for shortness of breath.     Cardiovascular: Negative for chest pain.   Gastrointestinal: Positive for abdominal pain, constipation and nausea. Negative for vomiting.   Genitourinary: Negative for difficulty urinating.   Musculoskeletal: Negative for gait problem.   Skin: Negative for wound.   Neurological: Negative for syncope.   Psychiatric/Behavioral: Negative for suicidal ideas.      All systems reviewed and marked as negative except as listed in HPI       PHYSICAL EXAM    I have reviewed the triage vital signs and nursing notes.    ED Triage Vitals   Temp Heart Rate Resp BP SpO2   07/10/22 0930 07/10/22 0930 07/10/22 0930 07/10/22 1029 07/10/22 0930   97.6 °F (36.4 °C) 84 18 179/84 96 %      Temp src Heart Rate Source Patient Position BP Location FiO2 (%)   07/10/22 0930 07/10/22 0930 07/10/22 1029 07/10/22 1029 --   Tympanic Monitor Lying Right arm        Physical Exam  Constitutional:       General: She is not in acute distress.     Appearance: She is well-developed.   HENT:      Head: Normocephalic and atraumatic.   Eyes:      General: No scleral icterus.     Conjunctiva/sclera: Conjunctivae normal.   Neck:      Trachea: No tracheal deviation.   Cardiovascular:      Rate and Rhythm: Normal rate and regular rhythm.   Pulmonary:      Effort: Pulmonary effort is normal.      Breath sounds: Normal breath sounds.   Abdominal:      General: There is distension.      Palpations: Abdomen is soft.      Tenderness: There is abdominal tenderness.      Comments: There is generalized abdominal distention and tenderness there is much worse in the left lower quadrant.   Musculoskeletal:         General: No deformity.      Cervical back: Normal range of motion.   Lymphadenopathy:      Cervical: No cervical adenopathy.   Skin:     General: Skin is warm and dry.   Neurological:      Mental Status: She is alert and oriented to person, place, and time.   Psychiatric:         Behavior: Behavior normal.         Vital signs and nursing notes  reviewed.            LAB RESULTS  Recent Results (from the past 24 hour(s))   Comprehensive Metabolic Panel    Collection Time: 07/10/22 10:28 AM    Specimen: Blood   Result Value Ref Range    Glucose 115 (H) 65 - 99 mg/dL    BUN 13 8 - 23 mg/dL    Creatinine 0.87 0.57 - 1.00 mg/dL    Sodium 136 136 - 145 mmol/L    Potassium 4.0 3.5 - 5.2 mmol/L    Chloride 98 98 - 107 mmol/L    CO2 26.0 22.0 - 29.0 mmol/L    Calcium 10.7 (H) 8.6 - 10.5 mg/dL    Total Protein 7.6 6.0 - 8.5 g/dL    Albumin 4.50 3.50 - 5.20 g/dL    ALT (SGPT) 9 1 - 33 U/L    AST (SGOT) 19 1 - 32 U/L    Alkaline Phosphatase 87 39 - 117 U/L    Total Bilirubin 0.8 0.0 - 1.2 mg/dL    Globulin 3.1 gm/dL    A/G Ratio 1.5 g/dL    BUN/Creatinine Ratio 14.9 7.0 - 25.0    Anion Gap 12.0 5.0 - 15.0 mmol/L    eGFR 65.0 >60.0 mL/min/1.73   Lipase    Collection Time: 07/10/22 10:28 AM    Specimen: Blood   Result Value Ref Range    Lipase 33 13 - 60 U/L   Green Top (Gel)    Collection Time: 07/10/22 10:28 AM   Result Value Ref Range    Extra Tube Hold for add-ons.    Lavender Top    Collection Time: 07/10/22 10:28 AM   Result Value Ref Range    Extra Tube hold for add-on    CBC Auto Differential    Collection Time: 07/10/22 10:28 AM    Specimen: Blood   Result Value Ref Range    WBC 10.48 3.40 - 10.80 10*3/mm3    RBC 4.62 3.77 - 5.28 10*6/mm3    Hemoglobin 14.2 12.0 - 15.9 g/dL    Hematocrit 41.6 34.0 - 46.6 %    MCV 90.0 79.0 - 97.0 fL    MCH 30.7 26.6 - 33.0 pg    MCHC 34.1 31.5 - 35.7 g/dL    RDW 12.1 (L) 12.3 - 15.4 %    RDW-SD 39.1 37.0 - 54.0 fl    MPV 11.1 6.0 - 12.0 fL    Platelets 234 140 - 450 10*3/mm3    Neutrophil % 82.7 (H) 42.7 - 76.0 %    Lymphocyte % 11.7 (L) 19.6 - 45.3 %    Monocyte % 4.2 (L) 5.0 - 12.0 %    Eosinophil % 0.9 0.3 - 6.2 %    Basophil % 0.2 0.0 - 1.5 %    Immature Grans % 0.3 0.0 - 0.5 %    Neutrophils, Absolute 8.67 (H) 1.70 - 7.00 10*3/mm3    Lymphocytes, Absolute 1.23 0.70 - 3.10 10*3/mm3    Monocytes, Absolute 0.44 0.10 - 0.90  10*3/mm3    Eosinophils, Absolute 0.09 0.00 - 0.40 10*3/mm3    Basophils, Absolute 0.02 0.00 - 0.20 10*3/mm3    Immature Grans, Absolute 0.03 0.00 - 0.05 10*3/mm3    nRBC 0.0 0.0 - 0.2 /100 WBC   Urinalysis With Microscopic If Indicated (No Culture) - Urine, Clean Catch    Collection Time: 07/10/22 10:29 AM    Specimen: Urine, Clean Catch   Result Value Ref Range    Color, UA Yellow Yellow, Straw    Appearance, UA Cloudy (A) Clear    pH, UA 6.0 5.0 - 8.0    Specific Gravity, UA 1.020 1.005 - 1.030    Glucose, UA Negative Negative    Ketones, UA 15 mg/dL (1+) (A) Negative    Bilirubin, UA Negative Negative    Blood, UA Large (3+) (A) Negative    Protein, UA Trace (A) Negative    Leuk Esterase, UA Moderate (2+) (A) Negative    Nitrite, UA Negative Negative    Urobilinogen, UA 1.0 E.U./dL 0.2 - 1.0 E.U./dL   Urinalysis, Microscopic Only - Urine, Clean Catch    Collection Time: 07/10/22 10:29 AM    Specimen: Urine, Clean Catch   Result Value Ref Range    RBC, UA 6-12 (A) None Seen, 0-2 /HPF    WBC, UA 3-5 (A) None Seen, 0-2 /HPF    Bacteria, UA 1+ (A) None Seen /HPF    Squamous Epithelial Cells, UA 0-2 None Seen, 0-2 /HPF    Hyaline Casts, UA None Seen None Seen /LPF    Methodology Automated Microscopy        Ordered the above labs and independently reviewed the results.        RADIOLOGY  No Radiology Exams Resulted Within Past 24 Hours    I ordered the above noted radiological studies. Independently reviewed by me and discussed with radiologist.  See dictation above for official radiology interpretation.      PROCEDURES    Procedures        MEDICATIONS GIVEN IN ER    Medications   sodium chloride 0.9 % flush 10 mL (has no administration in time range)   ampicillin-sulbactam (UNASYN) 3 g in sodium chloride 0.9 % 100 mL IVPB-VTB (has no administration in time range)   iopamidol (ISOVUE-300) 61 % injection 100 mL (85 mL Intravenous Given by Other 7/10/22 1125)   lactated ringers bolus 1,000 mL (1,000 mL Intravenous New  Bag 7/10/22 1306)   HYDROmorphone (DILAUDID) injection 0.5 mg (0.5 mg Intravenous Given 7/10/22 1304)   ondansetron (ZOFRAN) injection 4 mg (4 mg Intravenous Given 7/10/22 1304)         PROGRESS, DATA ANALYSIS, CONSULTS, AND MEDICAL DECISION MAKING    All labs have been independently reviewed by me.  All radiology studies have been reviewed by me.   EKG's independently reviewed by me.  Discussion below represents my analysis of pertinent findings related to patient's condition, differential diagnosis, treatment plan and final disposition.    DIFFERENTIAL DIAGNOSIS INCLUDE BUT NOT LIMITED TO:     UTI, colitis, diverticulitis, constipation    ED Course as of 07/10/22 1410   Sun Jul 10, 2022   1048 Blood, UA(!): Large (3+) [DC]   1048 Leukocytes, UA(!): Moderate (2+) [DC]   1048 Appearance, UA(!): Cloudy [DC]   1059 WBC, UA(!): 3-5 [DC]   1059 Bacteria, UA(!): 1+ [DC]   1059 RBC, UA(!): 6-12 [DC]   1343 Per radiology, patient's CT scan has worsened since the last CT scan 6 days ago.  Patient is having persistent nausea and dry heaves.  I think she would best benefit from admission for GI evaluation and IV antibiotics. [TD]   1401 I discussed the case with Dr. Byrd, hospitalist.  We reviewed the patient's labs, imaging and history.  He will admit. [TD]      ED Course User Index  [DC] Brannon Archibald PA  [TD] Ben Noguera II, MD       AS OF 14:10 EDT VITALS:    BP - 163/77  HR - 72  TEMP - 97.6 °F (36.4 °C) (Tympanic)  02 SATS - 99%      DIAGNOSIS  Final diagnoses:   Colitis   Dehydration   Nausea and vomiting, unspecified vomiting type         DISPOSITION  Admit    Pt masked in first look. I wore a surgical mask throughout my encounters with the pt. I performed hand hygiene on entry into the pt room and upon exit.     Much of this encounter note is an electronic transcription of spoken language to printed text.  As such, the subtleties and finesse of spoken language may permit erroneous, or at times,  nonsensical words or phrases to be inadvertently transcribed; Although I have reviewed the note for such errors, some may still exist.      Brannon Archibald PA  07/10/22 1413

## 2022-07-10 NOTE — ED TRIAGE NOTES
Patient to ed from  with complaints of generalized abd pain, denies N/V today. Patient recently here for same.

## 2022-07-10 NOTE — ED PROVIDER NOTES
MD ATTESTATION NOTE    The KAILA and I have discussed this patient's history, physical exam, and treatment plan.    I provided a substantive portion of the care of this patient. I personally performed the physical exam, in its entirety. The attached note describes my personal findings.      Sirisha Auguste is a 86 y.o. female who presents to the ED c/o lower abdominal pain has been constant since 6/23/2022.  It is sharp and squeezing in nature.  Nothing makes this worse or better.  She endorses having associated nausea.      On exam:  GENERAL: not distressed  HENT: nares patent  EYES: no scleral icterus  CV: regular rhythm, regular rate  RESPIRATORY: normal effort  ABDOMEN: soft, bilateral lower abdominal tenderness without rebound or guarding  MUSCULOSKELETAL: no deformity  NEURO: alert, moves all extremities, follows commands  SKIN: warm, dry    Labs  Recent Results (from the past 24 hour(s))   Comprehensive Metabolic Panel    Collection Time: 07/10/22 10:28 AM    Specimen: Blood   Result Value Ref Range    Glucose 115 (H) 65 - 99 mg/dL    BUN 13 8 - 23 mg/dL    Creatinine 0.87 0.57 - 1.00 mg/dL    Sodium 136 136 - 145 mmol/L    Potassium 4.0 3.5 - 5.2 mmol/L    Chloride 98 98 - 107 mmol/L    CO2 26.0 22.0 - 29.0 mmol/L    Calcium 10.7 (H) 8.6 - 10.5 mg/dL    Total Protein 7.6 6.0 - 8.5 g/dL    Albumin 4.50 3.50 - 5.20 g/dL    ALT (SGPT) 9 1 - 33 U/L    AST (SGOT) 19 1 - 32 U/L    Alkaline Phosphatase 87 39 - 117 U/L    Total Bilirubin 0.8 0.0 - 1.2 mg/dL    Globulin 3.1 gm/dL    A/G Ratio 1.5 g/dL    BUN/Creatinine Ratio 14.9 7.0 - 25.0    Anion Gap 12.0 5.0 - 15.0 mmol/L    eGFR 65.0 >60.0 mL/min/1.73   Lipase    Collection Time: 07/10/22 10:28 AM    Specimen: Blood   Result Value Ref Range    Lipase 33 13 - 60 U/L   Green Top (Gel)    Collection Time: 07/10/22 10:28 AM   Result Value Ref Range    Extra Tube Hold for add-ons.    Lavender Top    Collection Time: 07/10/22 10:28 AM   Result Value Ref Range    Extra  Tube hold for add-on    CBC Auto Differential    Collection Time: 07/10/22 10:28 AM    Specimen: Blood   Result Value Ref Range    WBC 10.48 3.40 - 10.80 10*3/mm3    RBC 4.62 3.77 - 5.28 10*6/mm3    Hemoglobin 14.2 12.0 - 15.9 g/dL    Hematocrit 41.6 34.0 - 46.6 %    MCV 90.0 79.0 - 97.0 fL    MCH 30.7 26.6 - 33.0 pg    MCHC 34.1 31.5 - 35.7 g/dL    RDW 12.1 (L) 12.3 - 15.4 %    RDW-SD 39.1 37.0 - 54.0 fl    MPV 11.1 6.0 - 12.0 fL    Platelets 234 140 - 450 10*3/mm3    Neutrophil % 82.7 (H) 42.7 - 76.0 %    Lymphocyte % 11.7 (L) 19.6 - 45.3 %    Monocyte % 4.2 (L) 5.0 - 12.0 %    Eosinophil % 0.9 0.3 - 6.2 %    Basophil % 0.2 0.0 - 1.5 %    Immature Grans % 0.3 0.0 - 0.5 %    Neutrophils, Absolute 8.67 (H) 1.70 - 7.00 10*3/mm3    Lymphocytes, Absolute 1.23 0.70 - 3.10 10*3/mm3    Monocytes, Absolute 0.44 0.10 - 0.90 10*3/mm3    Eosinophils, Absolute 0.09 0.00 - 0.40 10*3/mm3    Basophils, Absolute 0.02 0.00 - 0.20 10*3/mm3    Immature Grans, Absolute 0.03 0.00 - 0.05 10*3/mm3    nRBC 0.0 0.0 - 0.2 /100 WBC   Urinalysis With Microscopic If Indicated (No Culture) - Urine, Clean Catch    Collection Time: 07/10/22 10:29 AM    Specimen: Urine, Clean Catch   Result Value Ref Range    Color, UA Yellow Yellow, Straw    Appearance, UA Cloudy (A) Clear    pH, UA 6.0 5.0 - 8.0    Specific Gravity, UA 1.020 1.005 - 1.030    Glucose, UA Negative Negative    Ketones, UA 15 mg/dL (1+) (A) Negative    Bilirubin, UA Negative Negative    Blood, UA Large (3+) (A) Negative    Protein, UA Trace (A) Negative    Leuk Esterase, UA Moderate (2+) (A) Negative    Nitrite, UA Negative Negative    Urobilinogen, UA 1.0 E.U./dL 0.2 - 1.0 E.U./dL   Urinalysis, Microscopic Only - Urine, Clean Catch    Collection Time: 07/10/22 10:29 AM    Specimen: Urine, Clean Catch   Result Value Ref Range    RBC, UA 6-12 (A) None Seen, 0-2 /HPF    WBC, UA 3-5 (A) None Seen, 0-2 /HPF    Bacteria, UA 1+ (A) None Seen /HPF    Squamous Epithelial Cells, UA 0-2 None  Seen, 0-2 /HPF    Hyaline Casts, UA None Seen None Seen /LPF    Methodology Automated Microscopy        Radiology  No Radiology Exams Resulted Within Past 24 Hours    Medical Decision Making:  ED Course as of 07/10/22 1822   Sun Jul 10, 2022   1048 Blood, UA(!): Large (3+) [DC]   1048 Leukocytes, UA(!): Moderate (2+) [DC]   1048 Appearance, UA(!): Cloudy [DC]   1059 WBC, UA(!): 3-5 [DC]   1059 Bacteria, UA(!): 1+ [DC]   1059 RBC, UA(!): 6-12 [DC]   1343 Per radiology, patient's CT scan has worsened since the last CT scan 6 days ago.  Patient is having persistent nausea and dry heaves.  I think she would best benefit from admission for GI evaluation and IV antibiotics. [TD]   1401 I discussed the case with Dr. Byrd, hospitalist.  We reviewed the patient's labs, imaging and history.  He will admit. [TD]      ED Course User Index  [DC] Brannon Archibald PA  [TD] Ben Noguera II, MD       On medical chart review, patient was recently seen in the ER approximately 6 days ago.  CT scan showed sigmoid wall thickening.    PPE: Both the patient and I wore a surgical mask throughout the entire patient encounter. I wore protective goggles.     Diagnosis  Final diagnoses:   Colitis   Dehydration   Nausea and vomiting, unspecified vomiting type      Admit     Ben Noguera II, MD  07/10/22 1822       Ben Noguera II, MD  07/10/22 1822

## 2022-07-10 NOTE — PLAN OF CARE
Goal Outcome Evaluation:  Plan of Care Reviewed With: patient        Progress: no change  Outcome Evaluation: received pt from ER, c/o abdominal discomfort, Tylenol given, pt with dry heaving, remains NPO except ice, ivf initiated, iv abx given as ordered, ambulated to bathroom with assistance, falls precautions maintained for safety, having loose mucoid smear, voiding well, alert and oriented, scds on, heels elevated by pillows and accumax on mattress, consult for GI called

## 2022-07-11 ENCOUNTER — PRE-PROCEDURE SCREENING (OUTPATIENT)
Dept: GASTROENTEROLOGY | Facility: CLINIC | Age: 87
End: 2022-07-11

## 2022-07-11 PROBLEM — R11.2 NAUSEA AND VOMITING: Status: ACTIVE | Noted: 2022-07-10

## 2022-07-11 PROBLEM — K92.1 BLACK STOOL: Status: ACTIVE | Noted: 2022-07-10

## 2022-07-11 LAB
ALBUMIN SERPL-MCNC: 3.6 G/DL (ref 3.5–5.2)
ALBUMIN/GLOB SERPL: 1.6 G/DL
ALP SERPL-CCNC: 64 U/L (ref 39–117)
ALT SERPL W P-5'-P-CCNC: 6 U/L (ref 1–33)
ANION GAP SERPL CALCULATED.3IONS-SCNC: 9 MMOL/L (ref 5–15)
AST SERPL-CCNC: 15 U/L (ref 1–32)
BASOPHILS # BLD AUTO: 0.02 10*3/MM3 (ref 0–0.2)
BASOPHILS NFR BLD AUTO: 0.2 % (ref 0–1.5)
BILIRUB SERPL-MCNC: 0.8 MG/DL (ref 0–1.2)
BUN SERPL-MCNC: 10 MG/DL (ref 8–23)
BUN/CREAT SERPL: 15.2 (ref 7–25)
CALCIUM SPEC-SCNC: 9.6 MG/DL (ref 8.6–10.5)
CHLORIDE SERPL-SCNC: 103 MMOL/L (ref 98–107)
CO2 SERPL-SCNC: 24 MMOL/L (ref 22–29)
CREAT SERPL-MCNC: 0.66 MG/DL (ref 0.57–1)
DEPRECATED RDW RBC AUTO: 40.3 FL (ref 37–54)
EGFRCR SERPLBLD CKD-EPI 2021: 85.6 ML/MIN/1.73
EOSINOPHIL # BLD AUTO: 0.09 10*3/MM3 (ref 0–0.4)
EOSINOPHIL NFR BLD AUTO: 0.9 % (ref 0.3–6.2)
ERYTHROCYTE [DISTWIDTH] IN BLOOD BY AUTOMATED COUNT: 12.3 % (ref 12.3–15.4)
GLOBULIN UR ELPH-MCNC: 2.3 GM/DL
GLUCOSE SERPL-MCNC: 102 MG/DL (ref 65–99)
HCT VFR BLD AUTO: 35.1 % (ref 34–46.6)
HGB BLD-MCNC: 11.8 G/DL (ref 12–15.9)
IMM GRANULOCYTES # BLD AUTO: 0.03 10*3/MM3 (ref 0–0.05)
IMM GRANULOCYTES NFR BLD AUTO: 0.3 % (ref 0–0.5)
LYMPHOCYTES # BLD AUTO: 1.04 10*3/MM3 (ref 0.7–3.1)
LYMPHOCYTES NFR BLD AUTO: 10.9 % (ref 19.6–45.3)
MCH RBC QN AUTO: 30.6 PG (ref 26.6–33)
MCHC RBC AUTO-ENTMCNC: 33.6 G/DL (ref 31.5–35.7)
MCV RBC AUTO: 90.9 FL (ref 79–97)
MONOCYTES # BLD AUTO: 0.74 10*3/MM3 (ref 0.1–0.9)
MONOCYTES NFR BLD AUTO: 7.7 % (ref 5–12)
NEUTROPHILS NFR BLD AUTO: 7.66 10*3/MM3 (ref 1.7–7)
NEUTROPHILS NFR BLD AUTO: 80 % (ref 42.7–76)
NRBC BLD AUTO-RTO: 0 /100 WBC (ref 0–0.2)
PLATELET # BLD AUTO: 185 10*3/MM3 (ref 140–450)
PMV BLD AUTO: 11.4 FL (ref 6–12)
POTASSIUM SERPL-SCNC: 3.8 MMOL/L (ref 3.5–5.2)
PROT SERPL-MCNC: 5.9 G/DL (ref 6–8.5)
RBC # BLD AUTO: 3.86 10*6/MM3 (ref 3.77–5.28)
SODIUM SERPL-SCNC: 136 MMOL/L (ref 136–145)
WBC NRBC COR # BLD: 9.58 10*3/MM3 (ref 3.4–10.8)

## 2022-07-11 PROCEDURE — 80053 COMPREHEN METABOLIC PANEL: CPT | Performed by: INTERNAL MEDICINE

## 2022-07-11 PROCEDURE — G0378 HOSPITAL OBSERVATION PER HR: HCPCS

## 2022-07-11 PROCEDURE — 99204 OFFICE O/P NEW MOD 45 MIN: CPT | Performed by: PHYSICIAN ASSISTANT

## 2022-07-11 PROCEDURE — 25010000002 PIPERACILLIN SOD-TAZOBACTAM PER 1 G: Performed by: INTERNAL MEDICINE

## 2022-07-11 PROCEDURE — 97161 PT EVAL LOW COMPLEX 20 MIN: CPT

## 2022-07-11 PROCEDURE — 25010000002 ENOXAPARIN PER 10 MG: Performed by: INTERNAL MEDICINE

## 2022-07-11 PROCEDURE — 85025 COMPLETE CBC W/AUTO DIFF WBC: CPT | Performed by: INTERNAL MEDICINE

## 2022-07-11 PROCEDURE — 97116 GAIT TRAINING THERAPY: CPT

## 2022-07-11 RX ORDER — ENOXAPARIN SODIUM 100 MG/ML
40 INJECTION SUBCUTANEOUS EVERY 24 HOURS
Status: DISCONTINUED | OUTPATIENT
Start: 2022-07-11 | End: 2022-07-15 | Stop reason: HOSPADM

## 2022-07-11 RX ORDER — POLYETHYLENE GLYCOL 3350 17 G/17G
17 POWDER, FOR SOLUTION ORAL 2 TIMES DAILY
Status: DISCONTINUED | OUTPATIENT
Start: 2022-07-11 | End: 2022-07-13

## 2022-07-11 RX ORDER — AMOXICILLIN 250 MG
2 CAPSULE ORAL 2 TIMES DAILY
Status: DISCONTINUED | OUTPATIENT
Start: 2022-07-11 | End: 2022-07-15 | Stop reason: HOSPADM

## 2022-07-11 RX ORDER — SODIUM CHLORIDE, SODIUM LACTATE, POTASSIUM CHLORIDE, CALCIUM CHLORIDE 600; 310; 30; 20 MG/100ML; MG/100ML; MG/100ML; MG/100ML
100 INJECTION, SOLUTION INTRAVENOUS CONTINUOUS
Status: DISCONTINUED | OUTPATIENT
Start: 2022-07-11 | End: 2022-07-14

## 2022-07-11 RX ADMIN — METOPROLOL SUCCINATE 50 MG: 50 TABLET, EXTENDED RELEASE ORAL at 08:40

## 2022-07-11 RX ADMIN — DOCUSATE SODIUM 50MG AND SENNOSIDES 8.6MG 2 TABLET: 8.6; 5 TABLET, FILM COATED ORAL at 20:45

## 2022-07-11 RX ADMIN — SODIUM CHLORIDE, POTASSIUM CHLORIDE, SODIUM LACTATE AND CALCIUM CHLORIDE 100 ML/HR: 600; 310; 30; 20 INJECTION, SOLUTION INTRAVENOUS at 16:57

## 2022-07-11 RX ADMIN — PANTOPRAZOLE SODIUM 40 MG: 40 TABLET, DELAYED RELEASE ORAL at 06:53

## 2022-07-11 RX ADMIN — Medication 1 CAPSULE: at 08:40

## 2022-07-11 RX ADMIN — TAZOBACTAM SODIUM AND PIPERACILLIN SODIUM 3.38 G: 375; 3 INJECTION, SOLUTION INTRAVENOUS at 18:47

## 2022-07-11 RX ADMIN — ATORVASTATIN CALCIUM 10 MG: 20 TABLET, FILM COATED ORAL at 20:44

## 2022-07-11 RX ADMIN — ACETAMINOPHEN 650 MG: 325 TABLET, FILM COATED ORAL at 17:04

## 2022-07-11 RX ADMIN — Medication 10 ML: at 20:47

## 2022-07-11 RX ADMIN — SUMATRIPTAN SUCCINATE 100 MG: 100 TABLET ORAL at 18:33

## 2022-07-11 RX ADMIN — MONTELUKAST SODIUM 10 MG: 10 TABLET, FILM COATED ORAL at 20:45

## 2022-07-11 RX ADMIN — TAZOBACTAM SODIUM AND PIPERACILLIN SODIUM 3.38 G: 375; 3 INJECTION, SOLUTION INTRAVENOUS at 22:35

## 2022-07-11 RX ADMIN — ENOXAPARIN SODIUM 40 MG: 100 INJECTION SUBCUTANEOUS at 17:04

## 2022-07-11 RX ADMIN — Medication 10 ML: at 08:41

## 2022-07-11 RX ADMIN — POLYETHYLENE GLYCOL 3350 17 G: 17 POWDER, FOR SOLUTION ORAL at 20:45

## 2022-07-11 NOTE — CONSULTS
Nutrition Services    Patient Name:  Sirisha Auguste  YOB: 1935  MRN: [id number]  Admit Date:  7/10/2022    Pt was NPO for most of the day so RD has not visited. Now that she has a clear liquid order, RD to follow up in the morning and assess in person.     Electronically signed by:  LYNDA ASKEW RD  07/11/22 16:51 EDT

## 2022-07-11 NOTE — PLAN OF CARE
Goal Outcome Evaluation:              Outcome Evaluation: vss. a/o x4 c/o lower abdominal discomfort and relief with going to the bathroom. pt did not request any pain medications for pain. pt up with assist x1 to the bathroom, loos BM/smear. voiding without difficulty. scds in place, fall precautions maintained, accumax to bed, heels elevated with pillows. pt pleasant, care explained, question answered. pt rested intermittently throughout the shift. will continue to monitor.

## 2022-07-11 NOTE — THERAPY EVALUATION
Patient Name: Sirisha Auguste  : 1935    MRN: [id number]                              Today's Date: 2022       Admit Date: 7/10/2022    Visit Dx:     ICD-10-CM ICD-9-CM   1. Colitis  K52.9 558.9   2. Dehydration  E86.0 276.51   3. Nausea and vomiting, unspecified vomiting type  R11.2 787.01     Patient Active Problem List   Diagnosis   • Benign essential hypertension   • Borderline hyperglycemia   • Gastroesophageal reflux disease   • Hyperlipidemia   • Migraine   • Heart murmur   • Right fascicular block   • Acute thoracic back pain   • Dyspnea on exertion   • Recurrent vertigo   • Generalized osteoarthritis of multiple sites   • Diastolic dysfunction   • Chronic fatigue   • Mild concentric left ventricular hypertrophy (LVH)   • Hypercalcemia   • Hematoma   • Generalized anxiety disorder   • Chronic migraine without aura without status migrainosus, not intractable   • RBBB   • Colitis   • Abdominal pain   • UTI (urinary tract infection)     Past Medical History:   Diagnosis Date   • Arthritis    • Breast cyst    • GERD (gastroesophageal reflux disease)    • Hyperglycemia    • Hyperlipidemia    • Hypertension    • Migraines    • Murmur    • RBBB      Past Surgical History:   Procedure Laterality Date   • BLADDER SURGERY     • BREAST CYST ASPIRATION     • HYSTERECTOMY     • OOPHORECTOMY        General Information     Row Name 22 1009          Physical Therapy Time and Intention    Document Type evaluation  -     Mode of Treatment individual therapy;physical therapy  -     Row Name 22 1009          General Information    Patient Profile Reviewed yes  -     Prior Level of Function independent:;gait;transfer;bed mobility  -     Existing Precautions/Restrictions fall  -     Barriers to Rehab none identified  -     Row Name 22 1009          Living Environment    People in Home alone  -     Row Name 22 [facility]9          Home Main Entrance    Number of Stairs, Main Entrance  two  -     Row Name 07/11/22 1009          Stairs Within Home, Primary    Number of Stairs, Within Home, Primary none  -[facility] Name 07/11/22 1009          Cognition    Orientation Status (Cognition) oriented x 4  -[facility] Name 07/11/22 1009          Safety Issues, Functional Mobility    Impairments Affecting Function (Mobility) balance;endurance/activity tolerance;strength;pain  -           User Key  (r) = Recorded By, (t) = Taken By, (c) = Cosigned By    Initials Name Provider Type     Gabriela Harding PT Physical Therapist               Mobility     Row Name 07/11/22 1009          Bed Mobility    Bed Mobility supine-sit  -     Supine-Sit [place] (Bed Mobility) contact guard;verbal cues  -     Comment, (Bed Mobility) HHA x1 to pull to sit  -[facility] Name 07/11/22 1009          Sit-Stand Transfer    Sit-Stand [place] (Transfers) standby assist;verbal cues  -     Assistive Device (Sit-Stand Transfers) other (see comments)  None  -[facility] Name 07/11/22 1009          Gait/Stairs (Locomotion)    [place] Level (Gait) standby assist;verbal cues  -     Assistive Device (Gait) other (see comments)  None  -     Distance in Feet (Gait) 300ft  -     Deviations/Abnormal Patterns (Gait) teodoro decreased;gait speed decreased  -     [place] Level (Stairs) not tested  -     Comment, (Gait/Stairs) Tolerated gait well, distance limited 2/2 fatigue. A couple minor LOB but able to self-correct  -           User Key  (r) = Recorded By, (t) = Taken By, (c) = Cosigned By    Initials Name Provider Type     Gabriela Harding PT Physical Therapist               Obj/Interventions     [facility] Name 07/11/22 1011          Range of Motion Comprehensive    General Range of Motion bilateral lower extremity ROM WFL  -[facility] Name 07/11/22 1011          Strength Comprehensive (MMT)    General Manual Muscle Testing (MMT) Assessment lower extremity strength deficits identified  -     Comment,  General Manual Muscle Testing (MMT) Assessment Generalized weakness, BLE grossly 4/5  -[facility] Name 07/11/22 1011          Balance    Balance Assessment sitting static balance;standing static balance;sitting dynamic balance;standing dynamic balance  -     Static Sitting Balance modified independence  -     Dynamic Sitting Balance modified independence  -     Position, Sitting Balance unsupported;sitting edge of bed  -     Static Standing Balance standby assist;verbal cues  -     Dynamic Standing Balance standby assist;verbal cues  -     Position/Device Used, Standing Balance unsupported  -BH     Row Name 07/11/22 1011          Sensory Assessment (Somatosensory)    Sensory Assessment (Somatosensory) LE sensation intact  -           User Key  (r) = Recorded By, (t) = Taken By, (c) = Cosigned By    Initials Name Provider Type     Gabriela Harding, PT Physical Therapist               Goals/Plan     Row Name 07/11/22 1017          Bed Mobility Goal 1 (PT)    Activity/Assistive Device (Bed Mobility Goal 1, PT) bed mobility activities, all  -     [place] Level/Cues Needed (Bed Mobility Goal 1, PT) modified independence  -     Time Frame (Bed Mobility Goal 1, PT) 1 week  -BH     Row Name 07/11/22 1017          Transfer Goal 1 (PT)    Activity/Assistive Device (Transfer Goal 1, PT) transfers, all  -     [place] Level/Cues Needed (Transfer Goal 1, PT) modified independence  -     Time Frame (Transfer Goal 1, PT) 1 week  -BH     Row Name 07/11/22 1017          Gait Training Goal 1 (PT)    Activity/Assistive Device (Gait Training Goal 1, PT) gait (walking locomotion)  -     [place] Level (Gait Training Goal 1, PT) modified independence  -     Distance (Gait Training Goal 1, PT) 250ft  -     Time Frame (Gait Training Goal 1, PT) 1 week  -BH     Row Name 07/11/22 1017          Therapy Assessment/Plan (PT)    Planned Therapy Interventions (PT) balance training;bed mobility  training;gait training;home exercise program;patient/family education;strengthening;stair training;transfer training  [facility]           User Key  (r) = Recorded By, (t) = Taken By, (c) = Cosigned By    Initials Name Provider Type     Gabriela Harding, PT Physical Therapist               Clinical Impression     Row Name 07/11/22 1011          Pain    Pretreatment Pain Rating 0/10 - no pain  -     Posttreatment Pain Rating 0/10 - no pain  -     Row Name 07/11/22 1011          Plan of Care Review    Plan of Care Reviewed With patient;daughter  -     Outcome Evaluation Pt is an 85 yo F admitted from home with generalized abdominal pain - possible colitis. Pt lives alone and reports independence at BL with no AD, but has a hx of Meniere's with use of cane when having symptoms. Pt reports 2 falls in the last 6 months and has 2 ONDINA with no steps inside. Pt presents to PT with minimal functional deficits and appears close to BL. Pt transferred to EOB with CGA and STS with SBA. Pt ambulated to bathroom and completed toilet transfer/hygiene tasks independently. Pt ambulated an additional 300ft in hallway with no AD and SBA. Pt with a couple minor LOB, but was able to self-correct. Pt agreeable to sitting UI and encouraged to call out for assist d/t mild unsteadiness with gait - daughter present in room. Pt will continue to benefit from skilled PT a few times/week to address balance deficits and improve overall safety with D/C home. Anticipate D/C home with HHPT pending progress.  -     Row Name 07/11/22 1011          Therapy Assessment/Plan (PT)    Patient/Family Therapy Goals Statement (PT) Return to OF  -     Rehab Potential (PT) good, to achieve stated therapy goals  -     Criteria for Skilled Interventions Met (PT) yes  -     Therapy Frequency (PT) 3 times/wk  -     Row Name 07/11/22 1011          Vital Signs    O2 Delivery Pre Treatment room air  -     O2 Delivery Intra Treatment room air  -     O2  Delivery Post Treatment room air  -     Row Name 07/11/22 1011          Positioning and Restraints    Pre-Treatment Position in bed  -     Post Treatment Position chair  -     In Chair reclined;call light within reach;encouraged to call for assist;exit alarm on;with family/caregiver  -           User Key  (r) = Recorded By, (t) = Taken By, (c) = Cosigned By    Initials Name Provider Type     Gabriela Harding PT Physical Therapist               Outcome Measures     Row Name 07/11/22 1017          How much help from another person do you currently need...    Turning from your back to your side while in flat bed without using bedrails? 4  -     Moving from lying on back to sitting on the side of a flat bed without bedrails? 4  -BH     Moving to and from a bed to a chair (including a wheelchair)? 3  -     Standing up from a chair using your arms (e.g., wheelchair, bedside chair)? 4  -BH     Climbing 3-5 steps with a railing? 3  -     To walk in hospital room? 3  -     AM-PAC 6 Clicks Score (PT) 21  -     Highest level of mobility 6 --> Walked 10 steps or more  -     Row Name 07/11/22 1017          Functional Assessment    Outcome Measure Options AM-PAC 6 Clicks Basic Mobility (PT)  -           User Key  (r) = Recorded By, (t) = Taken By, (c) = Cosigned By    Initials Name Provider Type     Gabriela Harding PT Physical Therapist                             Physical Therapy Education                 Title: PT OT SLP Therapies (Done)     Topic: Physical Therapy (Done)     Point: Mobility training (Done)     Learning Progress Summary           Patient Acceptance, E,TB,D, VU,NR by  at 7/11/2022 1018                   Point: Home exercise program (Done)     Learning Progress Summary           Patient Acceptance, E,TB,D, VU,NR by  at 7/11/2022 1018                   Point: Body mechanics (Done)     Learning Progress Summary           Patient Acceptance, E,TB,D, VU,NR by  at 7/11/2022 1018                    Point: Precautions (Done)     Learning Progress Summary           Patient Acceptance, E,TB,D, VU,NR by  at 7/11/2022 1018                               User Key     Initials Effective Dates Name Provider Type Discipline     04/08/22 -  Gabriela Harding, PT Physical Therapist PT              PT Recommendation and Plan  Planned Therapy Interventions (PT): balance training, bed mobility training, gait training, home exercise program, patient/family education, strengthening, stair training, transfer training  Plan of Care Reviewed With: patient, daughter  Outcome Evaluation: Pt is an 85 yo F admitted from home with generalized abdominal pain - possible colitis. Pt lives alone and reports independence at BL with no AD, but has a hx of Meniere's with use of cane when having symptoms. Pt reports 2 falls in the last 6 months and has 2 ONDINA with no steps inside. Pt presents to PT with minimal functional deficits and appears close to BL. Pt transferred to EOB with CGA and STS with SBA. Pt ambulated to bathroom and completed toilet transfer/hygiene tasks independently. Pt ambulated an additional 300ft in hallway with no AD and SBA. Pt with a couple minor LOB, but was able to self-correct. Pt agreeable to sitting [facility] and encouraged to call out for assist d/t mild unsteadiness with gait - daughter present in room. Pt will continue to benefit from skilled PT a few times/week to address balance deficits and improve overall safety with D/C home. Anticipate D/C home with HHPT pending progress.     Time Calculation:    PT Charges     Row Name 07/11/22 1019             Time Calculation    Start Time 0945  -      Stop Time 1005  -      Time Calculation (min) 20 min  -      PT Received On 07/11/22  -      PT - Next Appointment 07/13/22  -      PT Goal Re-Cert Due Date 07/18/22  -              Time Calculation- PT    Total Timed Code Minutes- PT 15 minute(s)  -              Timed Charges    09409 - Gait  Training Minutes  10  -      97295 - PT Therapeutic Activity Minutes 5  -BH              Untimed Charges    PT Eval/Re-eval Minutes 5  -BH              Total Minutes    Timed Charges Total Minutes 15  -BH      Untimed Charges Total Minutes 5  -BH       Total Minutes 20  -BH            User Key  (r) = Recorded By, (t) = Taken By, (c) = Cosigned By    Initials Name Provider Type     Gabriela Harding, PT Physical Therapist              Therapy Charges for Today     Code Description Service Date Service Provider Modifiers Qty    [id number]  GAIT TRAINING EA 15 MIN 7/11/2022 Gabriela Harding, PT GP 1    [id number]  PT EVAL LOW COMPLEXITY 2 7/11/2022 Gabriela Harding, PT GP 1          PT G-Codes  Outcome Measure Options: AM-PAC 6 Clicks Basic Mobility (PT)  AM-PAC 6 Clicks Score (PT): 21    Gabriela Harding PT  7/11/2022

## 2022-07-11 NOTE — PLAN OF CARE
Goal Outcome Evaluation:  Plan of Care Reviewed With: patient, daughter           Outcome Evaluation: Pt is an 87 yo F admitted from home with generalized abdominal pain - possible colitis. Pt lives alone and reports independence at BL with no AD, but has a hx of Meniere's with use of cane when having symptoms. Pt reports 2 falls in the last 6 months and has 2 ONDINA with no steps inside. Pt presents to PT with minimal functional deficits and appears close to BL. Pt transferred to EOB with CGA and STS with SBA. Pt ambulated to bathroom and completed toilet transfer/hygiene tasks independently. Pt ambulated an additional 300ft in hallway with no AD and SBA. Pt with a couple minor LOB, but was able to self-correct. Pt agreeable to sitting [facility] and encouraged to call out for assist d/t mild unsteadiness with gait - daughter present in room. Pt will continue to benefit from skilled PT a few times/week to address balance deficits and improve overall safety with D/C home. Anticipate D/C home with HHPT pending progress.

## 2022-07-11 NOTE — PLAN OF CARE
Goal Outcome Evaluation:  VSS,  loose BMs, mepilex intact to left buttock, heels pink, tolerating clear liquids will be NPO after midnight for EGD tomorrow, a little anxious at times, up with assist to BR, falls protocol maintained,bed alarm in use  Plan of Care Reviewed With: patient

## 2022-07-11 NOTE — PROGRESS NOTES
Name: Sirisha Auguste ADMIT: 7/10/2022   : 1935  PCP: Majo Reynolds NP    MRN: [id number] LOS: 0 days   AGE/SEX: 86 y.o. female  ROOM: [facility]     Subjective   Subjective   Patient continues with lower abdominal pain and loose slimy stool associated with fresh bright blood per rectum at wiping.  Patient states that her stool is melena at this.  There is no fever or chills.  No nausea or vomiting.  No heartburn.  Positive frequency and urgency of urination but no dysuria.    Review of Systems  Cardiovascular/respiratory.  No chest pain/no shortness of breath/no cough/no wheeze/no palpitations/no hemoptysis.       Objective   Objective   Vital Signs  Temp:  [96.9 °F (36.1 °C)-98 °F (36.7 °C)] 97.1 °F (36.2 °C)  Heart Rate:  [54-80] 80  Resp:  [16-18] 16  BP: (129-159)/(68-78) 138/68  SpO2:  [93 %-97 %] 93 %  on   ;   Device (Oxygen Therapy): room air    Intake/Output Summary (Last 24 hours) at 2022 1556  Last data filed at 2022 1214  Gross per 24 hour   Intake 0 ml   Output 1100 ml   Net -1100 ml     Body mass index is 24.07 kg/m².      07/10/22  1524   Weight: 60.7 kg (133 lb 13.1 oz)     Physical Exam  General.  Elderly female.  Alert and oriented x3.  No apparent pain/distress/diaphoresis.  Normal mood and affect.  Eyes.  Pupils equal round and reactive.  Intact extraocular musculature.  No pallor or jaundice.  Normal projectory lids.  Oral cavity.  Moist mucous membrane.  Neck.  Supple.  No JVD.  No lymphadenopathy or thyromegaly.  Cardiovascular.  Regular rate and rhythm.  Bradycardia.  Grade 4 systolic murmur.  Abdomen.  Positive lower abdominal tenderness.  No distention.  No guarding or rebound.  No organomegaly.  Positive bowel sounds.  Extremities.  No clubbing cyanosis or edema.  CNS.  No acute focal neurological deficits.    Results Review:      Results from last 7 days   Lab Units 22  0535 07/10/22  1028   SODIUM mmol/L 136 136   POTASSIUM mmol/L 3.8 4.0   CHLORIDE  mmol/L 103 98   CO2 mmol/L 24.0 26.0   BUN mg/dL 10 13   CREATININE mg/dL 0.66 0.87   GLUCOSE mg/dL 102* 115*   CALCIUM mg/dL 9.6 10.7*   AST (SGOT) U/L 15 19   ALT (SGPT) U/L 6 9     Estimated Creatinine Clearance: 58.6 mL/min (by C-G formula based on SCr of 0.66 mg/dL).                            Invalid input(s):  PHOS        Invalid input(s): LDLCALC  Results from last 7 days   Lab Units 07/11/22  0535 07/10/22  1028   WBC 10*3/mm3 9.58 10.48   HEMOGLOBIN g/dL 11.8* 14.2   HEMATOCRIT % 35.1 41.6   PLATELETS 10*3/mm3 185 234   MCV fL 90.9 90.0   MCH pg 30.6 30.7   MCHC g/dL 33.6 34.1   RDW % 12.3 12.1*   RDW-SD fl 40.3 39.1   MPV fL 11.4 11.1   NEUTROPHIL % % 80.0* 82.7*   LYMPHOCYTE % % 10.9* 11.7*   MONOCYTES % % 7.7 4.2*   EOSINOPHIL % % 0.9 0.9   BASOPHIL % % 0.2 0.2   IMM GRAN % % 0.3 0.3   NEUTROS ABS 10*3/mm3 7.66* 8.67*   LYMPHS ABS 10*3/mm3 1.04 1.23   MONOS ABS 10*3/mm3 0.74 0.44   EOS ABS 10*3/mm3 0.09 0.09   BASOS ABS 10*3/mm3 0.02 0.02   IMMATURE GRANS (ABS) 10*3/mm3 0.03 0.03   NRBC /100 WBC 0.0 0.0             Results from last 7 days   Lab Units 07/10/22  1413   LACTATE mmol/L 1.8         Results from last 7 days   Lab Units 07/10/22  1028   LIPASE U/L 33     Results from last 7 days   Lab Units 07/10/22  1418 07/10/22  1413   BLOODCX  No growth at 24 hours No growth at 24 hours         Results from last 7 days   Lab Units 07/10/22  1029   NITRITE UA  Negative   WBC UA /HPF 3-5*   BACTERIA UA /HPF 1+*   SQUAM EPITHEL UA /HPF 0-2           Imaging:  Imaging Results (Last 24 Hours)     ** No results found for the last 24 hours. **             I reviewed the patient's new clinical results / labs / tests / procedures      Assessment/Plan     Active Hospital Problems    Diagnosis  POA   • **Colitis [K52.9]  Yes   • Abdominal pain [R10.9]  Unknown   • UTI (urinary tract infection) [N39.0]  Unknown   • Generalized anxiety disorder [F41.1]  Yes   • Diastolic dysfunction [I51.89]  Yes   • Benign essential  hypertension [I10]  Yes   • Gastroesophageal reflux disease [K21.9]  Yes      Resolved Hospital Problems   No resolved problems to display.           · Sigmoid colitis in a patient with a history of GERD.  That has failed outpatient treatment.  Plan clear liquid diet/IV fluid/GI consult.  Patient with normal white count.  Blood cultures obtained and are negative till now.  Will check stool PCR.  Differential diagnoses include infectious versus ischemic colitis.  We will ask GI to evaluate as she might need a colonoscopy.  We will initiate the patient on Zosyn.  · UTI.  Patient symptomatic with frequency and urgency.  Plan check urine culture.  She is status post p.o. antibiotic treatments as an outpatient.  If persistent 1 should consider a colovesical fistula.  · Chronic diastolic congestive heart failure/hypertension/cardiac murmur/right bundle branch block.  Clinically stable with good blood pressure control on Toprol and Maxide.  No evidence of angina or congestive heart failure.  · Hyperlipidemia.  Continue Lipitor.  · Glucose intolerance.  Will monitor.  · VTE prophylaxis with Lovenox.    Discussed my findings and plan of treatment with the patient.  Disposition.  To be determined based on clinical course.        Rocio Farmer MD  [facility]  07/11/22  15:56 EDT

## 2022-07-11 NOTE — PROGRESS NOTES
Discharge Planning Assessment  [facility]     Patient Name: Sirisha Auguste  MRN: [id number]  Today's Date: 7/11/2022    Admit Date: 7/10/2022     Discharge Needs Assessment     Row Name 07/11/22 1604       Living Environment    People in Home alone    Current Living Arrangements home    Primary Care Provided by self    Family Caregiver if Needed child(ervin), adult    Family Caregiver Names daughter Marcos Aiken    Quality of Family Relationships helpful;involved;supportive    Able to Return to Prior Arrangements yes       Transition Planning    Patient/Family Anticipates Transition to home    Patient/Family Anticipated Services at Transition rehabilitation services    Transportation Anticipated family or friend will provide       Discharge Needs Assessment    Concerns to be Addressed no discharge needs identified               Discharge Plan     Row Name 07/11/22 1614       Plan    Plan Return home; denies any discharge needs    Plan Comments Spoke with patient at bedside, face sheet verified. Patient lives alone in a single story home and uses a cane as needed. Patient stated she is independent with ADL's and drives short distances. Stated her daughter lives 10 minutes  away and they are in contact daily. Patient plans to return home at discharge and denies any discharge needs. Tejal Alonzo RN              Continued Care and Services - Admitted Since 7/10/2022    Coordination has not been started for this encounter.       Expected Discharge Date and Time     Expected Discharge Date Expected Discharge Time    Jul 13, 2022          Demographic Summary     Row Name 07/11/22 1612       General Information    Preferred Language English    Row Name 07/11/22 1603       General Information    Admission Type observation    Arrived From emergency department    Required Notices Provided Observation Status Notice    Referral Source admission list    Reason for Consult discharge planning    Preferred Language English                Functional Status     Row Name 07/11/22 1603       Functional Status    Usual Activity Tolerance moderate    Current Activity Tolerance moderate       Functional Status, IADL    Medications independent    Meal Preparation independent    Housekeeping independent    Laundry independent    Shopping independent               Psychosocial    No documentation.                Abuse/Neglect    No documentation.                Legal    No documentation.                Substance Abuse    No documentation.                Patient Forms    No documentation.                   Tejal Alonzo RN

## 2022-07-11 NOTE — CONSULTS
"[facility]  Initial Inpatient Consult Note    Referring Provider: Dr. Farmer    Reason for Consultation: Colitis    Subjective     History of present illness:      Thank you for allowing us to participate in the care of this patient.  86 y.o. female with hx of HTN/HLP, GERD, OA, RBBB presented to the ED with lower abdominal discomfort and black \"mushy\" \"sticky\" stools ongoing for several weeks.  No change in pain with bowel movements.  She is not eating much due to the discomfort.  Associated with nausea but denies vomiting.  Denies red blood in stool.  She did come to the ED initially on July 4 for her symptoms and was diagnosed with a UTI and sent home on cefdinir.  This did not change her symptoms.  She does report history of GERD/dyspepsia controlled on Nexium 20 mg daily.  She admits that she has been taking 2 Aleve per day since March.  Her last colonoscopy was at least 10 years ago she believes with Dr. Chavez.  She states this was normal and was told to follow-up in 10 years.     7/11/2022: CMP nml; CBC with Hgb 11.8 otherwise normal. Lipase nml;    7/10 CT scan A/P w/ contrast:  focal colitis of proximal sigmoid--slightly worse from 7/4 scan. persistent and mildly progressive moderate retention of stool throughout the colon.    7/4 CT scan without contrast showed moderate slightly irregular sigmoid colonic wall thickening with mild surrounding fat stranding-possible nonspecific focal colitis but cannot rule out underlying lesion.  Colonoscopy was recommended.    She denies family history of colon cancer or other GI cancers.  She has no significant GI history other than what is mentioned above.      Past Medical History:  Past Medical History:   Diagnosis Date   • Arthritis    • Breast cyst    • GERD (gastroesophageal reflux disease)    • Hyperglycemia    • Hyperlipidemia    • Hypertension    • Migraines    • Murmur    • RBBB      Past Surgical History:  Past Surgical History: "   Procedure Laterality Date   • BLADDER SURGERY     • BREAST CYST ASPIRATION     • HYSTERECTOMY     • OOPHORECTOMY        Social History:   Social History     Tobacco Use   • Smoking status: Former Smoker   • Smokeless tobacco: Never Used   • Tobacco comment: When she was 18   Substance Use Topics   • Alcohol use: Never      Family History:  Family History   Problem Relation Age of Onset   • Cancer Sister         breast   • Breast cancer Sister    • Cancer Maternal Aunt         breast   • Breast cancer Maternal Aunt        Home Meds:  Medications Prior to Admission   Medication Sig Dispense Refill Last Dose   • ALPRAZolam (XANAX) 0.5 MG tablet TAKE 1 TABLET AT NIGHT AS NEEDED FOR ANXIETY 90 tablet 1    • esomeprazole (nexIUM) 40 MG capsule TAKE 1 CAPSULE EVERY MORNING BEFORE BREAKFAST 90 capsule 3    • ezetimibe-simvastatin (VYTORIN) 10-20 MG per tablet TAKE 1 TABLET EVERY NIGHT 90 tablet 3    • Multiple Minerals-Vitamins (ADVANCED CALCIUM/D/MAGNESIUM) tablet Take  by mouth.      • SUMAtriptan (IMITREX) 100 MG tablet TAKE 1 TABLET AT ONSET OF HEADACHE. MAY REPEAT DOSE ONE TIME IN 2 HOURS IF HEADACHE NOT RELIEVED 810 tablet 3    • aspirin 81 MG EC tablet Take 81 mg by mouth Daily.      • cefdinir (OMNICEF) 300 MG capsule Take 1 capsule by mouth 2 (Two) Times a Day. 10 capsule 0    • diclofenac (VOLTAREN) 1 % gel gel Apply 4 g topically 4 (Four) Times a Day As Needed.      • estradiol (ESTRACE) 0.1 MG/GM vaginal cream Insert 2 g into the vagina Daily.      • meclizine (ANTIVERT) 25 MG tablet Take 1 tablet by mouth 3 (Three) Times a Day As Needed for dizziness. 180 tablet 1    • metoprolol succinate XL (TOPROL-XL) 50 MG 24 hr tablet TAKE 1 TABLET DAILY (Patient taking differently: Take 75 mg by mouth Daily.) 90 tablet 3    • montelukast (SINGULAIR) 10 MG tablet TAKE 1 TABLET EVERY NIGHT 90 tablet 3    • Probiotic Product (PROBIOTIC PO) Take  by mouth Every Other Day.      • promethazine-codeine (PHENERGAN with CODEINE)  6.25-10 MG/5ML syrup Take 5 mL by mouth Every 4 (Four) Hours As Needed for Cough. 180 mL 0    • triamterene-hydrochlorothiazide (MAXZIDE-25) 37.5-25 MG per tablet TAKE 1 TABLET DAILY (Patient taking differently: TAKE 1/2 TABLET DAILY) 90 tablet 3      Current Meds:   atorvastatin, 10 mg, Oral, Nightly  enoxaparin, 40 mg, Subcutaneous, Q24H  lactobacillus acidophilus, 1 capsule, Oral, Daily  metoprolol succinate XL, 50 mg, Oral, Daily  montelukast, 10 mg, Oral, Nightly  pantoprazole, 40 mg, Oral, QAM  piperacillin-tazobactam, 3.375 g, Intravenous, Once  piperacillin-tazobactam, 3.375 g, Intravenous, Q8H  sodium chloride, 10 mL, Intravenous, Q12H  triamterene-hydrochlorothiazide, 1 tablet, Oral, Daily      Allergies:  Allergies   Allergen Reactions   • Sulfa Antibiotics Other (See Comments)     unknown     Review of Systems  General ROS: negative for - fever or weight loss  Psychological ROS: negative for - hallucinations or suicidal ideation  ENT ROS: negative for - headaches, nasal congestion or nasal discharge  Respiratory ROS: no cough, shortness of breath, or wheezing  Cardiovascular ROS: no chest pain or dyspnea on exertion  Gastrointestinal ROS: positive for - abdominal pain, change in stools, melena and nausea  negative for - appetite loss, blood in stools, heartburn, swallowing difficulty/pain or vomiting  Genito-Urinary ROS: no dysuria, trouble voiding, or hematuria  Musculoskeletal ROS: negative for - joint pain or muscle pain  Neurological ROS: no TIA or stroke symptoms  Dermatological ROS: negative for pruritus and rash    Objective     Vital Signs  Temp:  [96.9 °F (36.1 °C)-98 °F (36.7 °C)] 97.1 °F (36.2 °C)  Heart Rate:  [54-80] 80  Resp:  [16-18] 16  BP: (129-159)/(68-78) 138/68  Physical Exam:   Constitutional:    Alert, cooperative, in no acute distress    Eyes:          conjunctivae and sclerae normal, no icterus    HENT:   Atraumatic, normal hearing, mucosa moist    Neck:   Trachea midline, supple     Lungs:     Clear to auscultation bilaterally, normal respiratory effort     Heart:    Regular rhythm and normal rate, no MGR, no abdominal bruits    Abdomen:     Soft, nondistended, diffuse lower abdominal and mid abdominal tenderness, minimal upper abdominal tenderness.  No guarding; normal bowel sounds , no organomegaly    Extremities:   no edema or erythema of bilateral lower extremities    Skin:   No bruising, rash, or pallor   Neurologic:  No tremors, no asterixis    Psychiatric:  normal mood and affect; A&O x3     Results Review:   I reviewed the patient's new clinical results.    Results from last 7 days   Lab Units 07/11/22  0535 07/10/22  1028   WBC 10*3/mm3 9.58 10.48   HEMOGLOBIN g/dL 11.8* 14.2   HEMATOCRIT % 35.1 41.6   PLATELETS 10*3/mm3 185 234     Results from last 7 days   Lab Units 07/11/22  0535 07/10/22  1028   SODIUM mmol/L 136 136   POTASSIUM mmol/L 3.8 4.0   CHLORIDE mmol/L 103 98   CO2 mmol/L 24.0 26.0   BUN mg/dL 10 13   CREATININE mg/dL 0.66 0.87   CALCIUM mg/dL 9.6 10.7*   BILIRUBIN mg/dL 0.8 0.8   ALK PHOS U/L 64 87   ALT (SGPT) U/L 6 9   AST (SGOT) U/L 15 19   GLUCOSE mg/dL 102* 115*         Lab Results   Lab Value Date/Time    LIPASE 33 07/10/2022 1028    LIPASE 33 07/04/2022 1145    LIPASE 35 02/14/2017 0112       Radiology:  CT Abdomen Pelvis With Contrast   Final Result   1. There are persistent and slightly progressive findings suggestive of   focal colitis of the proximal sigmoid colon.   2. There is persistent and mildly progressive moderate retention of   stool throughout the colon.       This report was finalized on 7/10/2022 2:49 PM by Dr. Troy Cisneros M.D.              Assessment & Plan   Patient Active Problem List   Diagnosis   • Benign essential hypertension   • Borderline hyperglycemia   • Gastroesophageal reflux disease   • Hyperlipidemia   • Migraine   • Heart murmur   • Right fascicular block   • Acute thoracic back pain   • Dyspnea on exertion   • Recurrent  vertigo   • Generalized osteoarthritis of multiple sites   • Diastolic dysfunction   • Chronic fatigue   • Mild concentric left ventricular hypertrophy (LVH)   • Hypercalcemia   • Hematoma   • Generalized anxiety disorder   • Chronic migraine without aura without status migrainosus, not intractable   • RBBB   • Colitis   • Abdominal pain   • UTI (urinary tract infection)       Assessment:  1. Lower abdominal pain  2. Nausea  3. Black stools  4. Abnormal CT scan of the sigmoid colon with focal colitis  5. Moderate stool retention on CT scan  6. Chronic dyspepsia, controlled symptomatically on Nexium 20 mg daily  7. History of NSAID use-Aleve daily    Plan:  · Inconsistent clinical picture with lower abdominal pain and CT scan suggesting sigmoid colitis however she has nausea and black stools consistent with melena.  Recent NSAID use would suggest she may have an ulcer but, again, CT scan lower abdominal pain is not consistent with this.  · Pending GI panel.  FOBT on July 4 was negative.  We will repeat today.  · Reviewed with Dr. Gates, will proceed with EGD tomorrow.  · Recommend PPI daily.  · Will start Senokot and MiraLAX twice daily and attempt to clear out bowels.  · Okay for clear liquid diet for now.  N.p.o. after midnight.      I discussed the patients findings and my recommendations with patient and family.    Dragon dictation used throughout this note.            Adela Iyer PA-C  [facility]  [address] [place] [address]  Office: [phone]

## 2022-07-12 ENCOUNTER — ANESTHESIA EVENT (OUTPATIENT)
Dept: GASTROENTEROLOGY | Facility: HOSPITAL | Age: 87
End: 2022-07-12

## 2022-07-12 ENCOUNTER — PREP FOR SURGERY (OUTPATIENT)
Dept: OTHER | Facility: HOSPITAL | Age: 87
End: 2022-07-12

## 2022-07-12 ENCOUNTER — ANESTHESIA (OUTPATIENT)
Dept: GASTROENTEROLOGY | Facility: HOSPITAL | Age: 87
End: 2022-07-12

## 2022-07-12 DIAGNOSIS — R10.30 LOWER ABDOMINAL PAIN: ICD-10-CM

## 2022-07-12 DIAGNOSIS — R93.5 ABNORMAL CT OF THE ABDOMEN: Primary | ICD-10-CM

## 2022-07-12 LAB
ANION GAP SERPL CALCULATED.3IONS-SCNC: 10 MMOL/L (ref 5–15)
BACTERIA BLD CULT: NORMAL
BASOPHILS # BLD AUTO: 0.03 10*3/MM3 (ref 0–0.2)
BASOPHILS NFR BLD AUTO: 0.5 % (ref 0–1.5)
BOTTLE TYPE: NORMAL
BUN SERPL-MCNC: 8 MG/DL (ref 8–23)
BUN/CREAT SERPL: 10.7 (ref 7–25)
CALCIUM SPEC-SCNC: 9.8 MG/DL (ref 8.6–10.5)
CHLORIDE SERPL-SCNC: 101 MMOL/L (ref 98–107)
CO2 SERPL-SCNC: 24 MMOL/L (ref 22–29)
CREAT SERPL-MCNC: 0.75 MG/DL (ref 0.57–1)
DEPRECATED RDW RBC AUTO: 38.9 FL (ref 37–54)
EGFRCR SERPLBLD CKD-EPI 2021: 77.6 ML/MIN/1.73
EOSINOPHIL # BLD AUTO: 0.25 10*3/MM3 (ref 0–0.4)
EOSINOPHIL NFR BLD AUTO: 4.1 % (ref 0.3–6.2)
ERYTHROCYTE [DISTWIDTH] IN BLOOD BY AUTOMATED COUNT: 12.1 % (ref 12.3–15.4)
GLUCOSE SERPL-MCNC: 107 MG/DL (ref 65–99)
HCT VFR BLD AUTO: 35.1 % (ref 34–46.6)
HEMOCCULT STL QL: POSITIVE
HGB BLD-MCNC: 12.1 G/DL (ref 12–15.9)
IMM GRANULOCYTES # BLD AUTO: 0.01 10*3/MM3 (ref 0–0.05)
IMM GRANULOCYTES NFR BLD AUTO: 0.2 % (ref 0–0.5)
LYMPHOCYTES # BLD AUTO: 1.01 10*3/MM3 (ref 0.7–3.1)
LYMPHOCYTES NFR BLD AUTO: 16.6 % (ref 19.6–45.3)
MCH RBC QN AUTO: 30.6 PG (ref 26.6–33)
MCHC RBC AUTO-ENTMCNC: 34.5 G/DL (ref 31.5–35.7)
MCV RBC AUTO: 88.9 FL (ref 79–97)
MONOCYTES # BLD AUTO: 0.47 10*3/MM3 (ref 0.1–0.9)
MONOCYTES NFR BLD AUTO: 7.7 % (ref 5–12)
NEUTROPHILS NFR BLD AUTO: 4.33 10*3/MM3 (ref 1.7–7)
NEUTROPHILS NFR BLD AUTO: 70.9 % (ref 42.7–76)
NRBC BLD AUTO-RTO: 0 /100 WBC (ref 0–0.2)
PLATELET # BLD AUTO: 185 10*3/MM3 (ref 140–450)
PMV BLD AUTO: 11.6 FL (ref 6–12)
POTASSIUM SERPL-SCNC: 3.4 MMOL/L (ref 3.5–5.2)
RBC # BLD AUTO: 3.95 10*6/MM3 (ref 3.77–5.28)
SODIUM SERPL-SCNC: 135 MMOL/L (ref 136–145)
WBC NRBC COR # BLD: 6.1 10*3/MM3 (ref 3.4–10.8)

## 2022-07-12 PROCEDURE — 88305 TISSUE EXAM BY PATHOLOGIST: CPT | Performed by: INTERNAL MEDICINE

## 2022-07-12 PROCEDURE — 82272 OCCULT BLD FECES 1-3 TESTS: CPT | Performed by: PHYSICIAN ASSISTANT

## 2022-07-12 PROCEDURE — G0378 HOSPITAL OBSERVATION PER HR: HCPCS

## 2022-07-12 PROCEDURE — 80048 BASIC METABOLIC PNL TOTAL CA: CPT | Performed by: INTERNAL MEDICINE

## 2022-07-12 PROCEDURE — 85025 COMPLETE CBC W/AUTO DIFF WBC: CPT | Performed by: INTERNAL MEDICINE

## 2022-07-12 PROCEDURE — 43239 EGD BIOPSY SINGLE/MULTIPLE: CPT | Performed by: INTERNAL MEDICINE

## 2022-07-12 PROCEDURE — 25010000002 PROPOFOL 10 MG/ML EMULSION: Performed by: ANESTHESIOLOGY

## 2022-07-12 PROCEDURE — 0DB68ZX EXCISION OF STOMACH, VIA NATURAL OR ARTIFICIAL OPENING ENDOSCOPIC, DIAGNOSTIC: ICD-10-PCS | Performed by: INTERNAL MEDICINE

## 2022-07-12 PROCEDURE — 25010000002 PIPERACILLIN SOD-TAZOBACTAM PER 1 G: Performed by: INTERNAL MEDICINE

## 2022-07-12 RX ORDER — PROPOFOL 10 MG/ML
VIAL (ML) INTRAVENOUS AS NEEDED
Status: DISCONTINUED | OUTPATIENT
Start: 2022-07-12 | End: 2022-07-12 | Stop reason: SURG

## 2022-07-12 RX ORDER — POTASSIUM CHLORIDE 1.5 G/1.77G
40 POWDER, FOR SOLUTION ORAL AS NEEDED
Status: DISCONTINUED | OUTPATIENT
Start: 2022-07-12 | End: 2022-07-15 | Stop reason: HOSPADM

## 2022-07-12 RX ORDER — LIDOCAINE HYDROCHLORIDE 20 MG/ML
INJECTION, SOLUTION INFILTRATION; PERINEURAL AS NEEDED
Status: DISCONTINUED | OUTPATIENT
Start: 2022-07-12 | End: 2022-07-12 | Stop reason: SURG

## 2022-07-12 RX ORDER — BISACODYL 5 MG/1
20 TABLET, DELAYED RELEASE ORAL ONCE
Status: DISCONTINUED | OUTPATIENT
Start: 2022-07-12 | End: 2022-07-15 | Stop reason: HOSPADM

## 2022-07-12 RX ORDER — POTASSIUM CHLORIDE 750 MG/1
40 TABLET, FILM COATED, EXTENDED RELEASE ORAL AS NEEDED
Status: DISCONTINUED | OUTPATIENT
Start: 2022-07-12 | End: 2022-07-15 | Stop reason: HOSPADM

## 2022-07-12 RX ORDER — POLYETHYLENE GLYCOL 3350 17 G/17G
0.5 POWDER, FOR SOLUTION ORAL EVERY 12 HOURS
Status: COMPLETED | OUTPATIENT
Start: 2022-07-12 | End: 2022-07-13

## 2022-07-12 RX ORDER — SODIUM CHLORIDE 9 MG/ML
30 INJECTION, SOLUTION INTRAVENOUS CONTINUOUS PRN
Status: DISCONTINUED | OUTPATIENT
Start: 2022-07-12 | End: 2022-07-13

## 2022-07-12 RX ORDER — AMLODIPINE BESYLATE 2.5 MG/1
2.5 TABLET ORAL
Status: DISCONTINUED | OUTPATIENT
Start: 2022-07-12 | End: 2022-07-13

## 2022-07-12 RX ORDER — POTASSIUM CHLORIDE 7.45 MG/ML
10 INJECTION INTRAVENOUS
Status: DISCONTINUED | OUTPATIENT
Start: 2022-07-12 | End: 2022-07-15 | Stop reason: HOSPADM

## 2022-07-12 RX ADMIN — METOPROLOL SUCCINATE 50 MG: 50 TABLET, EXTENDED RELEASE ORAL at 08:37

## 2022-07-12 RX ADMIN — SODIUM CHLORIDE, POTASSIUM CHLORIDE, SODIUM LACTATE AND CALCIUM CHLORIDE 100 ML/HR: 600; 310; 30; 20 INJECTION, SOLUTION INTRAVENOUS at 19:27

## 2022-07-12 RX ADMIN — PANTOPRAZOLE SODIUM 40 MG: 40 TABLET, DELAYED RELEASE ORAL at 06:18

## 2022-07-12 RX ADMIN — MONTELUKAST SODIUM 10 MG: 10 TABLET, FILM COATED ORAL at 20:41

## 2022-07-12 RX ADMIN — POTASSIUM CHLORIDE 40 MEQ: 10 TABLET, EXTENDED RELEASE ORAL at 16:16

## 2022-07-12 RX ADMIN — POTASSIUM CHLORIDE 40 MEQ: 10 TABLET, EXTENDED RELEASE ORAL at 10:20

## 2022-07-12 RX ADMIN — DOCUSATE SODIUM 50MG AND SENNOSIDES 8.6MG 2 TABLET: 8.6; 5 TABLET, FILM COATED ORAL at 08:36

## 2022-07-12 RX ADMIN — TRIAMTERENE AND HYDROCHLOROTHIAZIDE 1 TABLET: 37.5; 25 TABLET ORAL at 08:37

## 2022-07-12 RX ADMIN — SODIUM CHLORIDE 30 ML/HR: 9 INJECTION, SOLUTION INTRAVENOUS at 13:23

## 2022-07-12 RX ADMIN — Medication 10 ML: at 20:41

## 2022-07-12 RX ADMIN — POLYETHYLENE GLYCOL 3350 0.5 BOTTLE: 17 POWDER, FOR SOLUTION ORAL at 20:37

## 2022-07-12 RX ADMIN — SODIUM CHLORIDE, POTASSIUM CHLORIDE, SODIUM LACTATE AND CALCIUM CHLORIDE 100 ML/HR: 600; 310; 30; 20 INJECTION, SOLUTION INTRAVENOUS at 04:43

## 2022-07-12 RX ADMIN — POLYETHYLENE GLYCOL 3350 17 G: 17 POWDER, FOR SOLUTION ORAL at 08:36

## 2022-07-12 RX ADMIN — Medication 1 CAPSULE: at 08:37

## 2022-07-12 RX ADMIN — ACETAMINOPHEN 650 MG: 325 TABLET, FILM COATED ORAL at 20:46

## 2022-07-12 RX ADMIN — LIDOCAINE HYDROCHLORIDE 60 MG: 20 INJECTION, SOLUTION INFILTRATION; PERINEURAL at 14:47

## 2022-07-12 RX ADMIN — TAZOBACTAM SODIUM AND PIPERACILLIN SODIUM 3.38 G: 375; 3 INJECTION, SOLUTION INTRAVENOUS at 06:18

## 2022-07-12 RX ADMIN — PROPOFOL 100 MG: 10 INJECTION, EMULSION INTRAVENOUS at 14:46

## 2022-07-12 RX ADMIN — TAZOBACTAM SODIUM AND PIPERACILLIN SODIUM 3.38 G: 375; 3 INJECTION, SOLUTION INTRAVENOUS at 18:58

## 2022-07-12 RX ADMIN — AMLODIPINE BESYLATE 2.5 MG: 2.5 TABLET ORAL at 18:58

## 2022-07-12 RX ADMIN — ATORVASTATIN CALCIUM 10 MG: 20 TABLET, FILM COATED ORAL at 20:41

## 2022-07-12 RX ADMIN — DOCUSATE SODIUM 50MG AND SENNOSIDES 8.6MG 2 TABLET: 8.6; 5 TABLET, FILM COATED ORAL at 20:41

## 2022-07-12 NOTE — ANESTHESIA PREPROCEDURE EVALUATION
Anesthesia Evaluation     no history of anesthetic complications:  NPO Solid Status: > 8 hours  NPO Liquid Status: > 2 hours           Airway   Mallampati: II  Neck ROM: full  no difficulty expected  Dental - normal exam     Pulmonary - normal exam   (+) a smoker Former, shortness of breath,   (-) COPD, asthma, sleep apnea    PE comment: nonlabored  Cardiovascular - normal exam  Exercise tolerance: poor (<4 METS)    Rhythm: regular  Rate: normal    (+) hypertension, valvular problems/murmurs murmur, dysrhythmias (RBBB; fascicular block), CHF Diastolic >=55%, SCHULER, hyperlipidemia,   (-) past MI, CAD, angina      Neuro/Psych  (+) headaches, dizziness/light headedness, psychiatric history Anxiety,    (-) seizures, TIA, CVA  GI/Hepatic/Renal/Endo    (+)  GERD,    (-) liver disease, no renal disease, diabetes, no thyroid disorder    ROS Comment: Colitis    Musculoskeletal     (+) arthralgias, back pain,   Abdominal    Substance History      OB/GYN          Other   arthritis,                      Anesthesia Plan    ASA 3     MAC       Anesthetic plan, risks, benefits, and alternatives have been provided, discussed and informed consent has been obtained with: patient.        CODE STATUS:    Code Status (Patient has no pulse and is not breathing): CPR (Attempt to Resuscitate)  Medical Interventions (Patient has pulse or is breathing): Full Support

## 2022-07-12 NOTE — CONSULTS
Nutrition Services    Patient Name:  Sirisha Auguste  YOB: 1935  MRN: [id number]  Admit Date:  7/10/2022      Comment:  Consult: LAURA   Pt admitted for abdominal problems. She has been experiencing melena. Was on clear liquids briefly yesterday but NPO again for EGD today. No significant wt loss. RD to continue to follow and monitor for EGD results.     CLINICAL NUTRITION ASSESSMENT      Reason for Assessment Nurse Admission Screen     H&P  Per infectious disease-  86-year-old female with past medical history as noted below has been having abdominal discomfort and cramping decreased appetite started couple weeks ago.  She came to the emergency room on 7/4/2022 with 11 days history of abdominal cramps with antisecretory diarrhea leading her to use Imodium.  She never had diarrhea and Imodium made her constipated so she took MiraLAX for few days before coming to the emergency room on 7/4/2022.  She had associated decreased intake abdominal cramps and was not feeling very well.  She was having sticky dark stools.  Work-up at that time revealed evidence of urinary tract infection and CT scan abdomen and pelvis revealed abnormal appearing colonic wall in the sigmoid area concerning for evolving colitis or possible underlying mucosal lesion for which colonoscopy was recommended.  Patient was also noted to have abnormal urinalysis.      Past Medical History:   Diagnosis Date   • Arthritis    • Breast cyst    • GERD (gastroesophageal reflux disease)    • Hyperglycemia    • Hyperlipidemia    • Hypertension    • Migraines    • Murmur    • RBBB        Past Surgical History:   Procedure Laterality Date   • BLADDER SURGERY     • BREAST CYST ASPIRATION     • HYSTERECTOMY     • OOPHORECTOMY          Current Problems   Colitis        Nutritional Risk Screening       MST SCORE 3   Risk Screening Criteria Reduced oral intake over the last month     Encounter Information        Nutrition/Diet History:    Pt is NPO  again for EGD today. She was briefly on clear liquids yesterday. Pt has been having bloody BM's this admit along with abdominal pain. RD to continue to monitor for EGD results    Food Preferences: UTD      Supplements: UTD      Typical Activity Pattern: Ambulatory     Factors Affecting Intake: abdominal pain, altered GI function     Tests/Procedures: No new tests/procedures EGD today        Anthropometrics        Current Height  Current Weight    Weight: 60.7 kg (133 lb 13.1 oz) (07/10/22 1524)       Admission Weight 135# (61.2kg)    Ideal Body Weight (IBW) 113# (51.3kg)    Usual Body Weight (UBW)        Trending Weight Hx     Weight Change Consistent wt hx              PTA:     Wt Readings from Last 30 Encounters:   07/10/22 1524 60.7 kg (133 lb 13.1 oz)   07/04/22 1041 61.2 kg (135 lb)   05/27/21 0758 61.3 kg (135 lb 3.2 oz)   10/27/20 1000 60.7 kg (133 lb 12.8 oz)   05/26/20 0930 61.4 kg (135 lb 6.4 oz)   11/21/19 1307 61.2 kg (135 lb)   06/03/19 1045 61.3 kg (135 lb 3.2 oz)   12/04/18 0949 60.8 kg (134 lb)   08/23/18 0816 61.2 kg (135 lb)   08/16/18 1230 61.2 kg (135 lb)   08/06/18 1422 60.8 kg (134 lb)   03/20/18 0944 60.8 kg (134 lb)   11/02/17 0843 62.1 kg (137 lb)   08/11/17 1249 61.7 kg (136 lb)   05/23/17 0910 63.7 kg (140 lb 6.4 oz)   05/17/17 1307 63.4 kg (139 lb 12.8 oz)   05/08/17 0939 62.6 kg (138 lb)   02/14/17 0049 62.1 kg (137 lb)   02/09/17 1243 64.4 kg (142 lb)   12/08/16 0852 64.4 kg (142 lb)   06/09/16 0909 65.2 kg (143 lb 12.8 oz)   12/01/15 0924 66.3 kg (146 lb 1.9 oz)   10/09/15 1012 65.8 kg (144 lb 15.9 oz)   05/26/15 0920 67.3 kg (148 lb 6.3 oz)   02/23/15 1044 65.8 kg (144 lb 15.9 oz)   11/20/14 1010 68.2 kg (150 lb 6 oz)   10/07/14 0848 67.7 kg (149 lb 2.3 oz)   06/30/14 1406 68.1 kg (150 lb 2.1 oz)   05/15/14 0912 66.7 kg (147 lb)   10/17/13 0908 67.6 kg (149 lb 0.1 oz)      BMI kg/m2 Body mass index is 24.07 kg/m².       Labs       Pertinent Labs Reviewed    Results from last 7 days    Lab Units 07/12/22  0554 07/11/22  0535 07/10/22  1028   SODIUM mmol/L 135* 136 136   POTASSIUM mmol/L 3.4* 3.8 4.0   CHLORIDE mmol/L 101 103 98   CO2 mmol/L 24.0 24.0 26.0   BUN mg/dL 8 10 13   CREATININE mg/dL 0.75 0.66 0.87   CALCIUM mg/dL 9.8 9.6 10.7*   BILIRUBIN mg/dL  --  0.8 0.8   ALK PHOS U/L  --  64 87   ALT (SGPT) U/L  --  6 9   AST (SGOT) U/L  --  15 19   GLUCOSE mg/dL 107* 102* 115*     Results from last 7 days   Lab Units 07/12/22  0554   HEMOGLOBIN g/dL 12.1   HEMATOCRIT % 35.1   WBC 10*3/mm3 6.10     Results from last 7 days   Lab Units 07/12/22  0554 07/11/22  0535 07/10/22  1028   PLATELETS 10*3/mm3 185 185 234     COVID19   Date Value Ref Range Status   07/10/2022 Not Detected Not Detected - Ref. Range Final     Lab Results   Component Value Date    HGBA1C 5.50 08/06/2018          Medications           Scheduled Medications atorvastatin, 10 mg, Oral, Nightly  enoxaparin, 40 mg, Subcutaneous, Q24H  lactobacillus acidophilus, 1 capsule, Oral, Daily  metoprolol succinate XL, 50 mg, Oral, Daily  montelukast, 10 mg, Oral, Nightly  pantoprazole, 40 mg, Oral, QAM  piperacillin-tazobactam, 3.375 g, Intravenous, Q8H  polyethylene glycol, 17 g, Oral, BID  senna-docusate sodium, 2 tablet, Oral, BID  sodium chloride, 10 mL, Intravenous, Q12H  triamterene-hydrochlorothiazide, 1 tablet, Oral, Daily       Infusions lactated ringers, 100 mL/hr, Last Rate: 100 mL/hr (07/12/22 0443)       PRN Medications •  acetaminophen **OR** acetaminophen **OR** acetaminophen  •  HYDROmorphone  •  meclizine  •  ondansetron  •  potassium chloride **OR** potassium chloride **OR** potassium chloride  •  sodium chloride  •  sodium chloride  •  SUMAtriptan     Physical Findings        Physical Appearance Have not visited pt      NFPE Not applicable   --  Edema  no edema   Gastrointestinal hypoactive bowel sounds   Tubes/Drains none   Oral/Mouth Cavity NATALIE    Skin non-healing wound(s)   --  Estimated/Assessed Needs       Energy  "Requirements    Height for Calculation   62.52\"   Weight for Calculation 133# (60.7kg)    Method for Estimation  25 kcal/kg, 30 kcal/kg   EST Needs (kcal/day) 0674-2728       Protein Requirements    Weight for Calculation 133# (60.7kg)    EST Protein Needs (g/kg) 0.8 gm/kg, 1.0 gm/kg   EST Daily Needs (g/day) 48-61       Fluid Requirements 1 ml/kcal     Estimated Needs (mL/day) 1511-1235         Current Nutrition Orders & Evaluation of Intake       Oral Nutrition     Food Allergies NKFA   Current PO Diet NPO Diet NPO Type: Strict NPO   Supplement Boost Breeze, BID   PO Evaluation     Trending % PO Intake 0%        --  Nutrition Diagnosis        Nutrition Dx Problem 1 Problem: Inadequate Nutrient Intake    Etiology: Medical Diagnosis    Signs/Symptoms: NPO and Clear Liquid Diet       Intervention Goal        Intervention Goal(s) Nutrition support treatment, Improved nutrition related labs, Reduce/improve symptoms, Meet estimated needs, Disease management/therapy, Tolerate PO , Advance diet and Maintain weight     Nutrition Intervention        RD Action Follow Tx Progress and Care plan reviewed     Nutrition Prescription        Diet Prescription NPO    Supplement Prescription n/a   Prescription Ordered N/a    --  Monitor/Evaluation        Monitor Per protocol, I&O, PO intake, Pertinent labs, Weight, Skin status, GI status, Symptoms, POC/GOC     RD to follow per protocol.      Electronically signed by:  LYNDA ASKEW RD  07/12/22 10:17 EDT  "

## 2022-07-12 NOTE — ANESTHESIA POSTPROCEDURE EVALUATION
Patient: Sirisha Auguste    Procedure Summary     Date: 07/12/22 Room / Location:  [facility] /  [facility]    Anesthesia Start: 1444 Anesthesia Stop: 1456    Procedure: ESOPHAGOGASTRODUODENOSCOPY WITH COLD BIOPSIES (N/A Esophagus) Diagnosis:       Nausea and vomiting, unspecified vomiting type      Lower abdominal pain      Black stool      (Nausea and vomiting, unspecified vomiting type [R11.2])      (Lower abdominal pain [R10.30])      (Black stool [K92.1])    Surgeons: Yoselyn Gates MD Provider: Brannon Gabriel MD    Anesthesia Type: MAC ASA Status: 3          Anesthesia Type: MAC    Vitals  Vitals Value Taken Time   /63 07/12/22 1509   Temp     Pulse 75 07/12/22 1509   Resp 17 07/12/22 1509   SpO2 96 % 07/12/22 1509           Post Anesthesia Care and Evaluation    Patient location during evaluation: bedside  Patient participation: complete - patient participated  Level of consciousness: awake and alert  Pain management: adequate    Airway patency: patent  Anesthetic complications: No anesthetic complications  PONV Status: controlled  Cardiovascular status: blood pressure returned to baseline and acceptable  Respiratory status: acceptable  Hydration status: acceptable

## 2022-07-12 NOTE — PROGRESS NOTES
Name: Sirisha Auguste ADMIT: 7/10/2022   : 1935  PCP: Majo Reynodls NP    MRN: [id number] LOS: 0 days   AGE/SEX: 86 y.o. female  ROOM: [facility]     Subjective   Subjective   Patient continues with lower abdominal pain and loose slimy stool.  No fresh bright blood per rectum.  Positive melena..  no fever or chills.  Positive nausea but no vomiting.  No heartburn.  No urgency/frequency/dysuria.      Review of Systems  Cardiovascular/respiratory.  No chest pain/no shortness of breath/no cough/no wheeze/no palpitations/no hemoptysis.       Objective   Objective   Vital Signs  Temp:  [97 °F (36.1 °C)-98.6 °F (37 °C)] 97.8 °F (36.6 °C)  Heart Rate:  [62-77] 62  Resp:  [16-18] 16  BP: (124-186)/(63-85) 166/79  SpO2:  [94 %-99 %] 98 %  on  Flow (L/min):  [6] 6;   Device (Oxygen Therapy): room air    Intake/Output Summary (Last 24 hours) at 2022 1633  Last data filed at 2022 1124  Gross per 24 hour   Intake 0 ml   Output 1750 ml   Net -1750 ml     Body mass index is 24.07 kg/m².      07/10/22  1524   Weight: 60.7 kg (133 lb 13.1 oz)     Physical Exam    General.  Elderly female.  Alert and oriented x3.  No apparent pain/distress/diaphoresis.  Normal mood and affect.  Eyes.  Pupils equal round and reactive.  Intact extraocular musculature.  No pallor or jaundice.  Normal projectory lids.  Oral cavity.  Moist mucous membrane.  Neck.  Supple.  No JVD.  No lymphadenopathy or thyromegaly.  Cardiovascular.  Regular rate and rhythm.  Bradycardia.  Grade 3 systolic murmur.  Abdomen.  Mild lower abdominal tenderness.  No distention.  No guarding or rebound.  No organomegaly.  Positive bowel sounds.  Extremities.  No clubbing cyanosis or edema.  CNS.  No acute focal neurological deficits.    Results Review:      Results from last 7 days   Lab Units 22  0554 22  0535 07/10/22  1028   SODIUM mmol/L 135* 136 136   POTASSIUM mmol/L 3.4* 3.8 4.0   CHLORIDE mmol/L 101 103 98   CO2 mmol/L 24.0 24.0  26.0   BUN mg/dL 8 10 13   CREATININE mg/dL 0.75 0.66 0.87   GLUCOSE mg/dL 107* 102* 115*   CALCIUM mg/dL 9.8 9.6 10.7*   AST (SGOT) U/L  --  15 19   ALT (SGPT) U/L  --  6 9     Estimated Creatinine Clearance: 51.6 mL/min (by C-G formula based on SCr of 0.75 mg/dL).                            Invalid input(s):  PHOS        Invalid input(s): LDLCALC  Results from last 7 days   Lab Units 07/12/22  0554 07/11/22  0535 07/10/22  1028   WBC 10*3/mm3 6.10 9.58 10.48   HEMOGLOBIN g/dL 12.1 11.8* 14.2   HEMATOCRIT % 35.1 35.1 41.6   PLATELETS 10*3/mm3 185 185 234   MCV fL 88.9 90.9 90.0   MCH pg 30.6 30.6 30.7   MCHC g/dL 34.5 33.6 34.1   RDW % 12.1* 12.3 12.1*   RDW-SD fl 38.9 40.3 39.1   MPV fL 11.6 11.4 11.1   NEUTROPHIL % % 70.9 80.0* 82.7*   LYMPHOCYTE % % 16.6* 10.9* 11.7*   MONOCYTES % % 7.7 7.7 4.2*   EOSINOPHIL % % 4.1 0.9 0.9   BASOPHIL % % 0.5 0.2 0.2   IMM GRAN % % 0.2 0.3 0.3   NEUTROS ABS 10*3/mm3 4.33 7.66* 8.67*   LYMPHS ABS 10*3/mm3 1.01 1.04 1.23   MONOS ABS 10*3/mm3 0.47 0.74 0.44   EOS ABS 10*3/mm3 0.25 0.09 0.09   BASOS ABS 10*3/mm3 0.03 0.02 0.02   IMMATURE GRANS (ABS) 10*3/mm3 0.01 0.03 0.03   NRBC /100 WBC 0.0 0.0 0.0             Results from last 7 days   Lab Units 07/10/22  1413   LACTATE mmol/L 1.8         Results from last 7 days   Lab Units 07/10/22  1028   LIPASE U/L 33     Results from last 7 days   Lab Units 07/10/22  1418 07/10/22  1413   BLOODCX  No growth at 2 days Abnormal Stain*   BCIDPCR   --  Negative by BCID PCR. Culture to Follow.         Results from last 7 days   Lab Units 07/10/22  1029   NITRITE UA  Negative   WBC UA /HPF 3-5*   BACTERIA UA /HPF 1+*   SQUAM EPITHEL UA /HPF 0-2           Imaging:  Imaging Results (Last 24 Hours)     ** No results found for the last 24 hours. **             I reviewed the patient's new clinical results / labs / tests / procedures      Assessment/Plan     Active Hospital Problems    Diagnosis  POA   • **Colitis [K52.9]  Yes   • Abdominal pain  [R10.9]  Yes   • UTI (urinary tract infection) [N39.0]  Yes   • Nausea and vomiting [R11.2]  Yes   • Black stool [K92.1]  Yes   • Abnormal CT of the abdomen [R93.5]  Yes   • Generalized anxiety disorder [F41.1]  Yes   • Diastolic dysfunction [I51.89]  Yes   • Benign essential hypertension [I10]  Yes   • Gastroesophageal reflux disease [K21.9]  Yes      Resolved Hospital Problems   No resolved problems to display.           · Sigmoid colitis in a patient with a history of GERD.  That has failed outpatient treatment.  On clear liquid diet/IV fluid/GI consult.  Patient with normal white count.  1 of 2 blood cultures revealed [place]: Bacilli.  No fever or leukocytosis.  Stool PCR is pending.  Differential diagnoses include infectious versus ischemic colitis.  GI consult noted and appreciated.  EGD revealed a small hiatal hernia.  Scheduled for colonoscopy tomorrow.  Continue Zosyn.  Continue IV fluid.  · UTI. She is status post p.o. antibiotic treatments as an outpatient.  · Chronic diastolic congestive heart failure/hypertension/cardiac murmur/right bundle branch block.  Clinically stable blood pressure mildly elevated.  Continue Toprol and Maxide.  Add low-dose Norvasc.    No evidence of angina or congestive heart failure.  · Hyperlipidemia.  Continue Lipitor.  · Glucose intolerance.  Will monitor.  · VTE prophylaxis with Lovenox.    Discussed my findings and plan of treatment with the patient/.  Disposition.  To be determined based on clinical course.        Rocio Farmer MD  [facility]  07/12/22  16:33 EDT

## 2022-07-13 PROBLEM — E87.1 HYPONATREMIA: Status: ACTIVE | Noted: 2022-07-13

## 2022-07-13 PROBLEM — R78.81 BACTEREMIA: Status: ACTIVE | Noted: 2022-07-13

## 2022-07-13 PROBLEM — I48.20 CHRONIC ATRIAL FIBRILLATION WITH RAPID VENTRICULAR RESPONSE: Status: ACTIVE | Noted: 2022-07-13

## 2022-07-13 LAB
ADV 40+41 DNA STL QL NAA+NON-PROBE: NOT DETECTED
ANION GAP SERPL CALCULATED.3IONS-SCNC: 12.6 MMOL/L (ref 5–15)
ASTRO TYP 1-8 RNA STL QL NAA+NON-PROBE: NOT DETECTED
BASOPHILS # BLD AUTO: 0.02 10*3/MM3 (ref 0–0.2)
BASOPHILS NFR BLD AUTO: 0.2 % (ref 0–1.5)
BUN SERPL-MCNC: 7 MG/DL (ref 8–23)
BUN/CREAT SERPL: 10.1 (ref 7–25)
C CAYETANENSIS DNA STL QL NAA+NON-PROBE: NOT DETECTED
C COLI+JEJ+UPSA DNA STL QL NAA+NON-PROBE: NOT DETECTED
CALCIUM SPEC-SCNC: 9.3 MG/DL (ref 8.6–10.5)
CHLORIDE SERPL-SCNC: 96 MMOL/L (ref 98–107)
CO2 SERPL-SCNC: 20.4 MMOL/L (ref 22–29)
CREAT SERPL-MCNC: 0.69 MG/DL (ref 0.57–1)
CRYPTOSP DNA STL QL NAA+NON-PROBE: NOT DETECTED
DEPRECATED RDW RBC AUTO: 39.1 FL (ref 37–54)
E HISTOLYT DNA STL QL NAA+NON-PROBE: NOT DETECTED
EAEC PAA PLAS AGGR+AATA ST NAA+NON-PRB: NOT DETECTED
EC STX1+STX2 GENES STL QL NAA+NON-PROBE: NOT DETECTED
EGFRCR SERPLBLD CKD-EPI 2021: 84.6 ML/MIN/1.73
EOSINOPHIL # BLD AUTO: 0.13 10*3/MM3 (ref 0–0.4)
EOSINOPHIL NFR BLD AUTO: 1.3 % (ref 0.3–6.2)
EPEC EAE GENE STL QL NAA+NON-PROBE: DETECTED
ERYTHROCYTE [DISTWIDTH] IN BLOOD BY AUTOMATED COUNT: 12 % (ref 12.3–15.4)
ETEC LTA+ST1A+ST1B TOX ST NAA+NON-PROBE: NOT DETECTED
G LAMBLIA DNA STL QL NAA+NON-PROBE: NOT DETECTED
GLUCOSE SERPL-MCNC: 104 MG/DL (ref 65–99)
HCT VFR BLD AUTO: 36.1 % (ref 34–46.6)
HGB BLD-MCNC: 12.5 G/DL (ref 12–15.9)
IMM GRANULOCYTES # BLD AUTO: 0.05 10*3/MM3 (ref 0–0.05)
IMM GRANULOCYTES NFR BLD AUTO: 0.5 % (ref 0–0.5)
LYMPHOCYTES # BLD AUTO: 1.42 10*3/MM3 (ref 0.7–3.1)
LYMPHOCYTES NFR BLD AUTO: 14.6 % (ref 19.6–45.3)
MAGNESIUM SERPL-MCNC: 1.3 MG/DL (ref 1.6–2.4)
MCH RBC QN AUTO: 30.8 PG (ref 26.6–33)
MCHC RBC AUTO-ENTMCNC: 34.6 G/DL (ref 31.5–35.7)
MCV RBC AUTO: 88.9 FL (ref 79–97)
MONOCYTES # BLD AUTO: 0.77 10*3/MM3 (ref 0.1–0.9)
MONOCYTES NFR BLD AUTO: 7.9 % (ref 5–12)
NEUTROPHILS NFR BLD AUTO: 7.33 10*3/MM3 (ref 1.7–7)
NEUTROPHILS NFR BLD AUTO: 75.5 % (ref 42.7–76)
NOROVIRUS GI+II RNA STL QL NAA+NON-PROBE: NOT DETECTED
NRBC BLD AUTO-RTO: 0 /100 WBC (ref 0–0.2)
P SHIGELLOIDES DNA STL QL NAA+NON-PROBE: NOT DETECTED
PLATELET # BLD AUTO: 196 10*3/MM3 (ref 140–450)
PMV BLD AUTO: 11.2 FL (ref 6–12)
POTASSIUM SERPL-SCNC: 3.5 MMOL/L (ref 3.5–5.2)
QT INTERVAL: 358 MS
RBC # BLD AUTO: 4.06 10*6/MM3 (ref 3.77–5.28)
RVA RNA STL QL NAA+NON-PROBE: NOT DETECTED
S ENT+BONG DNA STL QL NAA+NON-PROBE: NOT DETECTED
SAPO I+II+IV+V RNA STL QL NAA+NON-PROBE: NOT DETECTED
SHIGELLA SP+EIEC IPAH ST NAA+NON-PROBE: NOT DETECTED
SODIUM SERPL-SCNC: 129 MMOL/L (ref 136–145)
TROPONIN T SERPL-MCNC: 0.02 NG/ML (ref 0–0.03)
TSH SERPL DL<=0.05 MIU/L-ACNC: 1.31 UIU/ML (ref 0.27–4.2)
URATE SERPL-MCNC: 2.9 MG/DL (ref 2.4–5.7)
V CHOL+PARA+VUL DNA STL QL NAA+NON-PROBE: NOT DETECTED
V CHOLERAE DNA STL QL NAA+NON-PROBE: NOT DETECTED
WBC NRBC COR # BLD: 9.72 10*3/MM3 (ref 3.4–10.8)
Y ENTEROCOL DNA STL QL NAA+NON-PROBE: NOT DETECTED

## 2022-07-13 PROCEDURE — 87040 BLOOD CULTURE FOR BACTERIA: CPT | Performed by: INTERNAL MEDICINE

## 2022-07-13 PROCEDURE — 99222 1ST HOSP IP/OBS MODERATE 55: CPT | Performed by: INTERNAL MEDICINE

## 2022-07-13 PROCEDURE — 84443 ASSAY THYROID STIM HORMONE: CPT | Performed by: INTERNAL MEDICINE

## 2022-07-13 PROCEDURE — 84484 ASSAY OF TROPONIN QUANT: CPT | Performed by: INTERNAL MEDICINE

## 2022-07-13 PROCEDURE — G0463 HOSPITAL OUTPT CLINIC VISIT: HCPCS | Performed by: INTERNAL MEDICINE

## 2022-07-13 PROCEDURE — 80048 BASIC METABOLIC PNL TOTAL CA: CPT | Performed by: INTERNAL MEDICINE

## 2022-07-13 PROCEDURE — 93010 ELECTROCARDIOGRAM REPORT: CPT | Performed by: INTERNAL MEDICINE

## 2022-07-13 PROCEDURE — 25010000002 PIPERACILLIN SOD-TAZOBACTAM PER 1 G: Performed by: INTERNAL MEDICINE

## 2022-07-13 PROCEDURE — 99232 SBSQ HOSP IP/OBS MODERATE 35: CPT | Performed by: PHYSICIAN ASSISTANT

## 2022-07-13 PROCEDURE — 25010000002 MAGNESIUM SULFATE 2 GM/50ML SOLUTION: Performed by: INTERNAL MEDICINE

## 2022-07-13 PROCEDURE — 83735 ASSAY OF MAGNESIUM: CPT | Performed by: INTERNAL MEDICINE

## 2022-07-13 PROCEDURE — 93005 ELECTROCARDIOGRAM TRACING: CPT | Performed by: INTERNAL MEDICINE

## 2022-07-13 PROCEDURE — 85025 COMPLETE CBC W/AUTO DIFF WBC: CPT | Performed by: INTERNAL MEDICINE

## 2022-07-13 PROCEDURE — 97110 THERAPEUTIC EXERCISES: CPT

## 2022-07-13 PROCEDURE — 25010000002 ENOXAPARIN PER 10 MG: Performed by: INTERNAL MEDICINE

## 2022-07-13 PROCEDURE — 99223 1ST HOSP IP/OBS HIGH 75: CPT | Performed by: STUDENT IN AN ORGANIZED HEALTH CARE EDUCATION/TRAINING PROGRAM

## 2022-07-13 PROCEDURE — 87507 IADNA-DNA/RNA PROBE TQ 12-25: CPT | Performed by: INTERNAL MEDICINE

## 2022-07-13 PROCEDURE — 84550 ASSAY OF BLOOD/URIC ACID: CPT | Performed by: INTERNAL MEDICINE

## 2022-07-13 RX ORDER — LEVOFLOXACIN 750 MG/1
750 TABLET ORAL EVERY 24 HOURS
Status: DISCONTINUED | OUTPATIENT
Start: 2022-07-13 | End: 2022-07-15 | Stop reason: HOSPADM

## 2022-07-13 RX ORDER — MAGNESIUM SULFATE HEPTAHYDRATE 40 MG/ML
2 INJECTION, SOLUTION INTRAVENOUS AS NEEDED
Status: DISCONTINUED | OUTPATIENT
Start: 2022-07-13 | End: 2022-07-15 | Stop reason: HOSPADM

## 2022-07-13 RX ORDER — POTASSIUM CHLORIDE 750 MG/1
40 TABLET, FILM COATED, EXTENDED RELEASE ORAL ONCE
Status: COMPLETED | OUTPATIENT
Start: 2022-07-13 | End: 2022-07-13

## 2022-07-13 RX ORDER — MAGNESIUM SULFATE HEPTAHYDRATE 40 MG/ML
4 INJECTION, SOLUTION INTRAVENOUS AS NEEDED
Status: DISCONTINUED | OUTPATIENT
Start: 2022-07-13 | End: 2022-07-15 | Stop reason: HOSPADM

## 2022-07-13 RX ORDER — METRONIDAZOLE 500 MG/1
500 TABLET ORAL EVERY 8 HOURS SCHEDULED
Status: DISCONTINUED | OUTPATIENT
Start: 2022-07-13 | End: 2022-07-15 | Stop reason: HOSPADM

## 2022-07-13 RX ORDER — METOPROLOL TARTRATE 5 MG/5ML
2.5 INJECTION INTRAVENOUS ONCE
Status: COMPLETED | OUTPATIENT
Start: 2022-07-13 | End: 2022-07-13

## 2022-07-13 RX ADMIN — METOPROLOL SUCCINATE 50 MG: 50 TABLET, EXTENDED RELEASE ORAL at 12:48

## 2022-07-13 RX ADMIN — PANTOPRAZOLE SODIUM 40 MG: 40 TABLET, DELAYED RELEASE ORAL at 06:30

## 2022-07-13 RX ADMIN — MAGNESIUM SULFATE HEPTAHYDRATE 2 G: 40 INJECTION, SOLUTION INTRAVENOUS at 20:24

## 2022-07-13 RX ADMIN — SODIUM CHLORIDE, POTASSIUM CHLORIDE, SODIUM LACTATE AND CALCIUM CHLORIDE 100 ML/HR: 600; 310; 30; 20 INJECTION, SOLUTION INTRAVENOUS at 05:32

## 2022-07-13 RX ADMIN — POTASSIUM CHLORIDE 40 MEQ: 10 TABLET, EXTENDED RELEASE ORAL at 15:44

## 2022-07-13 RX ADMIN — MAGNESIUM SULFATE HEPTAHYDRATE 2 G: 40 INJECTION, SOLUTION INTRAVENOUS at 22:49

## 2022-07-13 RX ADMIN — SODIUM CHLORIDE, POTASSIUM CHLORIDE, SODIUM LACTATE AND CALCIUM CHLORIDE 100 ML/HR: 600; 310; 30; 20 INJECTION, SOLUTION INTRAVENOUS at 21:52

## 2022-07-13 RX ADMIN — ENOXAPARIN SODIUM 40 MG: 100 INJECTION SUBCUTANEOUS at 18:02

## 2022-07-13 RX ADMIN — LEVOFLOXACIN 750 MG: 750 TABLET, FILM COATED ORAL at 18:03

## 2022-07-13 RX ADMIN — METOPROLOL TARTRATE 2.5 MG: 1 INJECTION, SOLUTION INTRAVENOUS at 10:29

## 2022-07-13 RX ADMIN — POTASSIUM CHLORIDE 40 MEQ: 10 TABLET, EXTENDED RELEASE ORAL at 12:41

## 2022-07-13 RX ADMIN — ATORVASTATIN CALCIUM 10 MG: 20 TABLET, FILM COATED ORAL at 20:23

## 2022-07-13 RX ADMIN — ACETAMINOPHEN 650 MG: 325 TABLET, FILM COATED ORAL at 12:53

## 2022-07-13 RX ADMIN — METRONIDAZOLE 500 MG: 500 TABLET, FILM COATED ORAL at 18:03

## 2022-07-13 RX ADMIN — DOCUSATE SODIUM 50MG AND SENNOSIDES 8.6MG 2 TABLET: 8.6; 5 TABLET, FILM COATED ORAL at 20:23

## 2022-07-13 RX ADMIN — METRONIDAZOLE 500 MG: 500 TABLET, FILM COATED ORAL at 20:23

## 2022-07-13 RX ADMIN — TAZOBACTAM SODIUM AND PIPERACILLIN SODIUM 3.38 G: 375; 3 INJECTION, SOLUTION INTRAVENOUS at 01:34

## 2022-07-13 RX ADMIN — MONTELUKAST SODIUM 10 MG: 10 TABLET, FILM COATED ORAL at 20:23

## 2022-07-13 RX ADMIN — Medication 10 ML: at 20:24

## 2022-07-13 RX ADMIN — POLYETHYLENE GLYCOL 3350 0.5 BOTTLE: 17 POWDER, FOR SOLUTION ORAL at 04:29

## 2022-07-13 RX ADMIN — MAGNESIUM SULFATE HEPTAHYDRATE 2 G: 40 INJECTION, SOLUTION INTRAVENOUS at 18:39

## 2022-07-13 NOTE — PROGRESS NOTES
[facility]  Inpatient Progress Note    Reason for Follow-up: Colitis    Subjective     Interval History:   Colonoscopy canceled for today due to new onset A. fib.  Pending cardiac evaluation.  EGD yesterday showed small hiatal hernia otherwise normal.  No evidence of bleeding.  Pathology pending.    She reports tolerating prep well yesterday.  No melena or hematochezia in stools that she is aware of.  Tolerating clear liquids well.  Abdominal pain is improving.  She denies shortness of air, chest pain.  No history of A. fib.    7/13 labs show hemoglobin stable at 12.5, sodium 129, chloride 96, normal creatinine.    Current Facility-Administered Medications:   •  acetaminophen (TYLENOL) tablet 650 mg, 650 mg, Oral, Q4H PRN, 650 mg at 07/13/22 1253 **OR** acetaminophen (TYLENOL) 160 MG/5ML solution 650 mg, 650 mg, Oral, Q4H PRN **OR** acetaminophen (TYLENOL) suppository 650 mg, 650 mg, Rectal, Q4H PRN, Yoselyn Gates MD  •  atorvastatin (LIPITOR) tablet 10 mg, 10 mg, Oral, Nightly, Yoselyn Gates MD, 10 mg at 07/12/22 2041  •  bisacodyl (DULCOLAX) EC tablet 20 mg, 20 mg, Oral, Once, Yoselyn Gates MD  •  Enoxaparin Sodium (LOVENOX) syringe 40 mg, 40 mg, Subcutaneous, Q24H, Yoselyn Gates MD, 40 mg at 07/11/22 1704  •  HYDROmorphone (DILAUDID) injection 0.5 mg, 0.5 mg, Intravenous, Q2H PRN, Yoselyn Gates MD  •  lactated ringers infusion, 100 mL/hr, Intravenous, Continuous, Yoselyn Gates MD, Stopped at 07/13/22 0907  •  lactobacillus acidophilus (RISAQUAD) capsule 1 capsule, 1 capsule, Oral, Daily, Yoselyn Gates MD, 1 capsule at 07/12/22 0837  •  meclizine (ANTIVERT) tablet 25 mg, 25 mg, Oral, TID PRN, Yoselyn Gates MD  •  [START ON 7/14/2022] metoprolol succinate XL (TOPROL-XL) 24 hr tablet 75 mg, 75 mg, Oral, Daily, Rocio Faremr MD  •  montelukast (SINGULAIR) tablet 10 mg, 10 mg, Oral, Nightly, Yoselyn Gates MD, 10 mg at 07/12/22 2041  •   ondansetron (ZOFRAN) injection 4 mg, 4 mg, Intravenous, Q6H PRN, Yoselyn Gates MD, 4 mg at 07/10/22 2110  •  pantoprazole (PROTONIX) EC tablet 40 mg, 40 mg, Oral, QAM, Yoselyn Gates MD, 40 mg at 07/13/22 0630  •  piperacillin-tazobactam (ZOSYN) 3.375 g in iso-osmotic dextrose 50 ml (premix), 3.375 g, Intravenous, Q8H, Yoselyn Gates MD, 3.375 g at 07/13/22 1253  •  potassium chloride (K-DUR,KLOR-CON) ER tablet 40 mEq, 40 mEq, Oral, PRN, 40 mEq at 07/13/22 1241 **OR** potassium chloride (KLOR-CON) packet 40 mEq, 40 mEq, Oral, PRN **OR** potassium chloride 10 mEq in 100 mL IVPB, 10 mEq, Intravenous, Q1H PRN, Yoselyn Gates MD  •  potassium chloride (K-DUR,KLOR-CON) ER tablet 40 mEq, 40 mEq, Oral, Once, Rocio Farmer MD  •  sennosides-docusate (PERICOLACE) 8.6-50 MG per tablet 2 tablet, 2 tablet, Oral, BID, Yoselyn Gates MD, 2 tablet at 07/12/22 2041  •  sodium chloride 0.9 % flush 10 mL, 10 mL, Intravenous, PRN, Yoselyn Gates MD  •  sodium chloride 0.9 % flush 10 mL, 10 mL, Intravenous, Q12H, Yoselyn Gates MD, 10 mL at 07/12/22 2041  •  sodium chloride 0.9 % flush 10 mL, 10 mL, Intravenous, PRN, Yoselyn Gates MD  •  SUMAtriptan (IMITREX) tablet 100 mg, 100 mg, Oral, Q2H PRN, Yoselyn Gates MD, 100 mg at 07/11/22 1833  Review of Systems:    The following systems were reviewed and negative;  respiratory and cardiovascular    Objective     Vital Signs  Temp:  [96.8 °F (36 °C)-98.1 °F (36.7 °C)] 97.7 °F (36.5 °C)  Heart Rate:  [] 97  Resp:  [16-18] 16  BP: (115-177)/() 125/84  Body mass index is 24.33 kg/m².    Intake/Output Summary (Last 24 hours) at 7/13/2022 1502  Last data filed at 7/13/2022 1253  Gross per 24 hour   Intake 3450 ml   Output 100 ml   Net 3350 ml     I/O this shift:  In: 50 [IV Piggyback:50]  Out: -      Physical Exam:    General: patient awake, alert and cooperative   Eyes: Normal lids and lashes, no scleral icterus   Skin: warm and dry, not  jaundiced   Pulm: regular and unlabored   Abdomen: soft, mild lower abdominal tenderness, nondistended; normal bowel sounds   Psychiatric: Normal mood and behavior; memory intact     Results Review:     I reviewed the patient's new clinical results.    Results from last 7 days   Lab Units 07/13/22  0710 07/12/22  0554 07/11/22  0535   WBC 10*3/mm3 9.72 6.10 9.58   HEMOGLOBIN g/dL 12.5 12.1 11.8*   HEMATOCRIT % 36.1 35.1 35.1   PLATELETS 10*3/mm3 196 185 185     Results from last 7 days   Lab Units 07/13/22  0710 07/12/22  0554 07/11/22  0535 07/10/22  1028   SODIUM mmol/L 129* 135* 136 136   POTASSIUM mmol/L 3.5 3.4* 3.8 4.0   CHLORIDE mmol/L 96* 101 103 98   CO2 mmol/L 20.4* 24.0 24.0 26.0   BUN mg/dL 7* 8 10 13   CREATININE mg/dL 0.69 0.75 0.66 0.87   CALCIUM mg/dL 9.3 9.8 9.6 10.7*   BILIRUBIN mg/dL  --   --  0.8 0.8   ALK PHOS U/L  --   --  64 87   ALT (SGPT) U/L  --   --  6 9   AST (SGOT) U/L  --   --  15 19   GLUCOSE mg/dL 104* 107* 102* 115*         Lab Results   Lab Value Date/Time    LIPASE 33 07/10/2022 1028    LIPASE 33 07/04/2022 1145    LIPASE 35 02/14/2017 0112       Radiology:  CT Abdomen Pelvis With Contrast   Final Result   1. There are persistent and slightly progressive findings suggestive of   focal colitis of the proximal sigmoid colon.   2. There is persistent and mildly progressive moderate retention of   stool throughout the colon.       This report was finalized on 7/10/2022 2:49 PM by Dr. Troy Cisneros M.D.              Assessment & Plan     Patient Active Problem List   Diagnosis   • Benign essential hypertension   • Borderline hyperglycemia   • Gastroesophageal reflux disease   • Hyperlipidemia   • Migraine   • Heart murmur   • Right fascicular block   • Acute thoracic back pain   • Dyspnea on exertion   • Recurrent vertigo   • Generalized osteoarthritis of multiple sites   • Diastolic dysfunction   • Chronic fatigue   • Mild concentric left ventricular hypertrophy (LVH)   •  Hypercalcemia   • Hematoma   • Generalized anxiety disorder   • Chronic migraine without aura without status migrainosus, not intractable   • RBBB   • Colitis   • Abdominal pain   • UTI (urinary tract infection)   • Nausea and vomiting   • Black stool   • Abnormal CT of the abdomen   • Bacteremia   • Chronic atrial fibrillation with rapid ventricular response (HCC)   • Hyponatremia       Assessment:  1. Lower abdominal pain  2. Nausea  3. Black stools  4. Abnormal CT scan of the sigmoid colon with focal colitis  5. Moderate stool retention on CT scan  6. Chronic dyspepsia, controlled symptomatically on Nexium 20 mg daily  7. History of NSAID use-Aleve daily      Plan:  · EGD unremarkable.  Recommend proceeding with colonoscopy which was scheduled for today however this was canceled due to A. fib new onset.  · Will await cardiology work-up and release to proceed with colonoscopy.  · Will keep on clear liquid diet today until cardiology sees in hopes of performing a colonoscopy tomorrow if cleared.  · GI panel PCR collected and pending results.  · Pathology from EGD pending.    I discussed the patients findings and my recommendations with patient and family.    Dragon dictation used throughout this note.            Adela Iyer PA-C  [facility]  [address] [place] [address]  Office: [phone]

## 2022-07-13 NOTE — PROGRESS NOTES
Name: Sirisha Auguste ADMIT: 7/10/2022   : 1935  PCP: Majo Reynolds NP    MRN: [id number] LOS: 0 days   AGE/SEX: 86 y.o. female  ROOM: [facility]     Subjective   Subjective   Patient was noticed to have a rapid atrial fibrillation at the endoscopy suite prior to colonoscopy.  EKG confirmed.  Patient was asymptomatic with no chest pain/shortness of breath/palpitation.  She has denied any PND or orthopnea.  No ankle edema.  No cough.  No wheeze.  No hemoptysis.  She reports improvement of the lower abdominal pain and nausea.  Positive diarrhea without fresh bright blood per rectum or melena.  Positive chills but no fever.  No dizziness.    Review of Systems  .  No dysuria or hematuria.     Objective   Objective   Vital Signs  Temp:  [96.8 °F (36 °C)-98.1 °F (36.7 °C)] 97.7 °F (36.5 °C)  Heart Rate:  [] 97  Resp:  [16-18] 16  BP: (115-177)/() 125/84  SpO2:  [96 %-99 %] 97 %  on  Flow (L/min):  [6] 6;   Device (Oxygen Therapy): room air    Intake/Output Summary (Last 24 hours) at 2022 1330  Last data filed at 2022 1253  Gross per 24 hour   Intake 3450 ml   Output 100 ml   Net 3350 ml     Body mass index is 24.33 kg/m².      07/10/22  1524 22  1012   Weight: 60.7 kg (133 lb 13.1 oz) 60.3 kg (133 lb)     Physical Exam  General.  Elderly female.  Alert and oriented x3.  No apparent pain/distress/diaphoresis.  Normal mood and affect.  Eyes.  Pupils equal round and reactive.  Intact extraocular musculature.  No pallor or jaundice.  Normal projectory lids.  Oral cavity.  Moist mucous membrane.  Neck.  Supple.  No JVD.  No lymphadenopathy or thyromegaly.  Cardiovascular.  Rapid atrial fibrillation. Grade 3 systolic murmur.  Abdomen.    Resolved abdominal tenderness.  No distention.  No guarding or rebound.  No organomegaly.  Positive bowel sounds.  Extremities.  No clubbing cyanosis or edema.  CNS.  No acute focal neurological deficits.      Results Review:      Results from  last 7 days   Lab Units 07/13/22  0710 07/12/22  0554 07/11/22  0535 07/10/22  1028   SODIUM mmol/L 129* 135* 136 136   POTASSIUM mmol/L 3.5 3.4* 3.8 4.0   CHLORIDE mmol/L 96* 101 103 98   CO2 mmol/L 20.4* 24.0 24.0 26.0   BUN mg/dL 7* 8 10 13   CREATININE mg/dL 0.69 0.75 0.66 0.87   GLUCOSE mg/dL 104* 107* 102* 115*   CALCIUM mg/dL 9.3 9.8 9.6 10.7*   AST (SGOT) U/L  --   --  15 19   ALT (SGPT) U/L  --   --  6 9     Estimated Creatinine Clearance: 50.1 mL/min (by C-G formula based on SCr of 0.69 mg/dL).                            Invalid input(s):  PHOS        Invalid input(s): LDLCALC  Results from last 7 days   Lab Units 07/13/22  0710 07/12/22  0554 07/11/22  0535 07/10/22  1028   WBC 10*3/mm3 9.72 6.10 9.58 10.48   HEMOGLOBIN g/dL 12.5 12.1 11.8* 14.2   HEMATOCRIT % 36.1 35.1 35.1 41.6   PLATELETS 10*3/mm3 196 185 185 234   MCV fL 88.9 88.9 90.9 90.0   MCH pg 30.8 30.6 30.6 30.7   MCHC g/dL 34.6 34.5 33.6 34.1   RDW % 12.0* 12.1* 12.3 12.1*   RDW-SD fl 39.1 38.9 40.3 39.1   MPV fL 11.2 11.6 11.4 11.1   NEUTROPHIL % % 75.5 70.9 80.0* 82.7*   LYMPHOCYTE % % 14.6* 16.6* 10.9* 11.7*   MONOCYTES % % 7.9 7.7 7.7 4.2*   EOSINOPHIL % % 1.3 4.1 0.9 0.9   BASOPHIL % % 0.2 0.5 0.2 0.2   IMM GRAN % % 0.5 0.2 0.3 0.3   NEUTROS ABS 10*3/mm3 7.33* 4.33 7.66* 8.67*   LYMPHS ABS 10*3/mm3 1.42 1.01 1.04 1.23   MONOS ABS 10*3/mm3 0.77 0.47 0.74 0.44   EOS ABS 10*3/mm3 0.13 0.25 0.09 0.09   BASOS ABS 10*3/mm3 0.02 0.03 0.02 0.02   IMMATURE GRANS (ABS) 10*3/mm3 0.05 0.01 0.03 0.03   NRBC /100 WBC 0.0 0.0 0.0 0.0             Results from last 7 days   Lab Units 07/10/22  1413   LACTATE mmol/L 1.8         Results from last 7 days   Lab Units 07/10/22  1028   LIPASE U/L 33     Results from last 7 days   Lab Units 07/10/22  1418 07/10/22  1413   BLOODCX  No growth at 2 days Bacillus species*   BCIDPCR   --  Negative by BCID PCR. Culture to Follow.         Results from last 7 days   Lab Units 07/10/22  1029   NITRITE UA  Negative   WBC  UA /HPF 3-5*   BACTERIA UA /HPF 1+*   SQUAM EPITHEL UA /HPF 0-2           Imaging:  Imaging Results (Last 24 Hours)     ** No results found for the last 24 hours. **             I reviewed the patient's new clinical results / labs / tests / procedures      Assessment/Plan     Active Hospital Problems    Diagnosis  POA   • **Colitis [K52.9]  Yes   • Bacteremia [R78.81]  Yes   • Chronic atrial fibrillation with rapid ventricular response (HCC) [I48.20]  No   • Hyponatremia [E87.1]  No   • Abdominal pain [R10.9]  Yes   • UTI (urinary tract infection) [N39.0]  Yes   • Nausea and vomiting [R11.2]  Yes   • Black stool [K92.1]  Yes   • Abnormal CT of the abdomen [R93.5]  Yes   • Generalized anxiety disorder [F41.1]  Yes   • Diastolic dysfunction [I51.89]  Yes   • Benign essential hypertension [I10]  Yes   • Gastroesophageal reflux disease [K21.9]  Yes      Resolved Hospital Problems   No resolved problems to display.         · Sigmoid colitis in a patient with a history of GERD.  That has failed outpatient treatment.  On clear liquid diet/IV fluid.  1 of 2 blood cultures revealed bacillus species. No fever or leukocytosis.  Stool PCR is pending.  Differential diagnoses include infectious versus ischemic colitis.  GI consult noted and appreciated.  EGD revealed a small hiatal hernia.    Colonoscopy canceled today secondary to new onset rapid A. fib. Continue Zosyn.  Continue IV fluid.  · Bacillus bacteremia.  Source mostly GI.  Currently on Zosyn.  Will check 2D echo.  We will repeat blood cultures.  We will ask ID to see.  · Hyponatremia.  We will check urine electrolytes and osmolarity.  Will check uric acid.  Mostly secondary to Maxide.  Will DC.  Now patient daughter states that patient is not originally on Maxide.  If sodium not improving by tomorrow we will consult nephrology.  · UTI. She is status post p.o. antibiotic treatments as an outpatient.  · Chronic diastolic congestive heart failure/hypertension/cardiac  murmur/right bundle branch block/new onset rapid A. fib..   Patient is asymptomatic.  S/p IV Lopressor in the endoscopy suite with improvement of the rate.  Hemodynamically stable.  No angina or congestive heart failure.  With increase Toprol (patient daughter states that she is on 75 mg daily not 50).  We will stop Norvasc.  Good blood pressure control.  Will check magnesium/TSH/echo.  We will transfer to telemetry unit.  We will consult cardiology.   Will keep potassium more than 4.  Correct hyponatremia.  We will hold off anticoagulation as the patient has been having GI bleed.  · Hyperlipidemia.  Continue Lipitor.  · Glucose intolerance.  Will monitor.  · VTE prophylaxis with Lovenox.     Discussed my findings and plan of treatment with the patient/daughter  Disposition.  To be determined based on clinical course.        Rocio Farmer MD  [facility]  07/13/22  13:30 EDT

## 2022-07-13 NOTE — NURSING NOTE
Pt in new afib according to stat 12 lead ekg- consult sent to [facility]- iv metoprolol per AA orders, pt tolerating well.

## 2022-07-13 NOTE — PLAN OF CARE
Goal Outcome Evaluation:  Plan of Care Reviewed With: patient        Progress: no change  Outcome Evaluation: Alert, oriented, cooperative. Bowel prep with Miralax done, passed several large loose stool but still muddy. Tap water enema done at 06:50H, passed large amount, still not clear. May need more water enema. IV Fluids on flow. Consent signed for Colonoscopy.

## 2022-07-13 NOTE — NURSING NOTE
Spoke with serafin Townsend to give report and also spoke with Vonda from [facility] and she stated that since patient was stable we could take her back to floor. The AA has spoke with patient regarding all this and I have also kept her informed.

## 2022-07-13 NOTE — PLAN OF CARE
Goal Outcome Evaluation:  Plan of Care Reviewed With: patient, daughter           Outcome Evaluation: Pt very agreeable to PT session, RN ok'd even though pt in A-fib earlier and will be moving to monitored bed-no c/o per pt, amb 200ft fast-paced , HHA, cues to slow for safety; pt slightly impulsive during tfer OOB due to having urgency for BR; pt plans home when medically ready and dtr can stay initially if needed, and will check on regularly if not staying w/pt    Patient was intermittently wearing a face mask during this therapy encounter. Therapist used appropriate personal protective equipment including eye protection, mask, and gloves.  Mask used was standard procedure mask. Appropriate PPE was worn during the entire therapy session. Hand hygiene was completed before and after therapy session. Patient is not in enhanced droplet precautions.

## 2022-07-13 NOTE — PLAN OF CARE
Goal Outcome Evaluation:  Plan of Care Reviewed With: patient        Progress: declining  Outcome Evaluation: alert and oriented, denies pain except for in her head - Tylenol given with relief, pt went down for colonoscopy - but it was cancelled d/t new onset a-fib, cardiology consulted, ID consulted today, GI stool pnl sent to lab - positive for ecoli - GI aware, ivf infusing, pt voided but missed hat - still need urine sample, ambulated with assistance of 1, sat in chair today, no n/v, tolerating clear liquids, potassium supplement given for potassium level of 3.5, pt transferred to telemetry

## 2022-07-13 NOTE — THERAPY TREATMENT NOTE
Patient Name: Sirisha Auguste  : 1935    MRN: [id number]                              Today's Date: 2022       Admit Date: 7/10/2022    Visit Dx:     ICD-10-CM ICD-9-CM   1. Colitis  K52.9 558.9   2. Dehydration  E86.0 276.51   3. Nausea and vomiting, unspecified vomiting type  R11.2 787.01   4. Lower abdominal pain  R10.30 789.09   5. Black stool  K92.1 792.1     Patient Active Problem List   Diagnosis   • Benign essential hypertension   • Borderline hyperglycemia   • Gastroesophageal reflux disease   • Hyperlipidemia   • Migraine   • Heart murmur   • Right fascicular block   • Acute thoracic back pain   • Dyspnea on exertion   • Recurrent vertigo   • Generalized osteoarthritis of multiple sites   • Diastolic dysfunction   • Chronic fatigue   • Mild concentric left ventricular hypertrophy (LVH)   • Hypercalcemia   • Hematoma   • Generalized anxiety disorder   • Chronic migraine without aura without status migrainosus, not intractable   • RBBB   • Colitis   • Abdominal pain   • UTI (urinary tract infection)   • Nausea and vomiting   • Black stool   • Abnormal CT of the abdomen   • Bacteremia   • Chronic atrial fibrillation with rapid ventricular response (HCC)   • Hyponatremia     Past Medical History:   Diagnosis Date   • Arthritis    • Breast cyst    • GERD (gastroesophageal reflux disease)    • Hyperglycemia    • Hyperlipidemia    • Hypertension    • Migraines    • Murmur    • RBBB      Past Surgical History:   Procedure Laterality Date   • BLADDER SURGERY     • BREAST CYST ASPIRATION     • HYSTERECTOMY     • OOPHORECTOMY        General Information     Row Name 22 1725          Physical Therapy Time and Intention    Document Type therapy note (daily note)  -SULEMA     Mode of Treatment individual therapy;physical therapy  -     Row Name 22 6083          General Information    Patient Profile Reviewed yes  -SULEMA     Existing Precautions/Restrictions fall  -     Row Name 22 6151           Living Environment    People in Home alone  -     Row Name 07/13/22 1725          Cognition    Orientation Status (Cognition) oriented x 4  -     Row Name 07/13/22 1725          Safety Issues, Functional Mobility    Safety Issues Affecting Function (Mobility) awareness of need for assistance;safety precaution awareness  -     Impairments Affecting Function (Mobility) balance  -           User Key  (r) = Recorded By, (t) = Taken By, (c) = Cosigned By    Initials Name Provider Type    Jelena Ronquillo PTA Physical Therapist Assistant               Mobility     Row Name 07/13/22 1726          Bed Mobility    Supine-Sit Mobile (Bed Mobility) standby assist  -     Assistive Device (Bed Mobility) bed rails;head of bed elevated  -     Row Name 07/13/22 1726          Transfers    Comment, (Transfers) pt impulsive, but needing to get to BR in a hurry; has had meds for colonoscopy, but now it is canceled due to A-fib  -     Row Name 07/13/22 1726          Sit-Stand Transfer    Sit-Stand Mobile (Transfers) contact guard;verbal cues  getting tangled in IV line, nsg present and trying to assist upon my entry  -     Assistive Device (Sit-Stand Transfers) --  HHA  -     Row Name 07/13/22 1726          Gait/Stairs (Locomotion)    Mobile Level (Gait) contact guard;verbal cues  -     Assistive Device (Gait) --  HHA  -     Distance in Feet (Gait) 200ft, cues to slow pace and to be safe when making turns as pt had slight LOB , but able to regain w/CGA  -     Deviations/Abnormal Patterns (Gait) base of support, narrow  fast-paced  -           User Key  (r) = Recorded By, (t) = Taken By, (c) = Cosigned By    Initials Name Provider Type    Jelena Ronquillo PTA Physical Therapist Assistant               Obj/Interventions     Row Name 07/13/22 1730          Motor Skills    Motor Skills coordination  pt was able to wipe self in BR , just cued not to slide off edge of commode  -            User Key  (r) = Recorded By, (t) = Taken By, (c) = Cosigned By    Initials Name Provider Type    Jelena Ronquillo PTA Physical Therapist Assistant               Goals/Plan    No documentation.                Clinical Impression     Row Name 07/13/22 1731          Pain    Pretreatment Pain Rating 0/10 - no pain  -     Posttreatment Pain Rating 0/10 - no pain  -[facility] Name 07/13/22 1731          Plan of Care Review    Plan of Care Reviewed With patient;daughter  -     Outcome Evaluation Pt very agreeable to PT session, RN ok'd even though pt in A-fib earlier and will be moving to monitored bed-no c/o per pt, amb 200ft fast-paced , HHA, cues to slow for safety; pt slightly impulsive during tfer OOB due to having urgency for BR; pt plans home when medically ready and dtr can stay initially if needed, and will check on regularly if not staying w/pt  -[facility] Name 07/13/22 1731          Therapy Assessment/Plan (PT)    Rehab Potential (PT) good, to achieve stated therapy goals  -     Criteria for Skilled Interventions Met (PT) yes  -     Row Name 07/13/22 1731          Vital Signs    O2 Delivery Pre Treatment room air  -[facility] Name 07/13/22 1731          Positioning and Restraints    Pre-Treatment Position in bed  -     Post Treatment Position bed  -     In Bed fowlers;call light within reach;encouraged to call for assist;exit alarm on;with family/caregiver;notified nsg  getting ready to move to [facility]           User Key  (r) = Recorded By, (t) = Taken By, (c) = Cosigned By    Initials Name Provider Type    Jelena Ronquillo PTA Physical Therapist Assistant               Outcome Measures     Row Name 07/13/22 1737 07/13/22 0800       How much help from another person do you currently need...    Turning from your back to your side while in flat bed without using bedrails? 4  -JM 4  -WF    Moving from lying on back to sitting on the side of a flat bed without bedrails? 3  -JM 4  -WF     Moving to and from a bed to a chair (including a wheelchair)? 3  - 3  -WF    Standing up from a chair using your arms (e.g., wheelchair, bedside chair)? 4  - 4  -WF    Climbing 3-5 steps with a railing? 3  - 3  -WF    To walk in hospital room? 3  - 3  -WF    AM-PAC 6 Clicks Score (PT) 20  - 21  -WF    Highest level of mobility 6 --> Walked 10 steps or more  - 6 --> Walked 10 steps or more  -WF          User Key  (r) = Recorded By, (t) = Taken By, (c) = Cosigned By    Initials Name Provider Type    WF Cary Joe RN Registered Nurse    Jelena Ronquillo PTA Physical Therapist Assistant                             Physical Therapy Education                 Title: PT OT SLP Therapies (Done)     Topic: Physical Therapy (Done)     Point: Mobility training (Done)     Learning Progress Summary           Patient Eager, E,TB,D, VU,DU by  at 7/13/2022 1737    Acceptance, E,TB,D, VU,NR by  at 7/11/2022 1018   Family Eager, E,TB,D, VU,DU by  at 7/13/2022 1737                   Point: Home exercise program (Done)     Learning Progress Summary           Patient Eager, E,TB,D, VU,DU by  at 7/13/2022 1737    Acceptance, E,TB,D, VU,NR by  at 7/11/2022 1018   Family Eager, E,TB,D, VU,DU by  at 7/13/2022 1737                   Point: Body mechanics (Done)     Learning Progress Summary           Patient Eager, E,TB,D, VU,DU by  at 7/13/2022 1737    Acceptance, E,TB,D, VU,NR by  at 7/11/2022 1018   Family Eager, E,TB,D, VU,DU by  at 7/13/2022 1737                   Point: Precautions (Done)     Learning Progress Summary           Patient Eager, E,TB,D, VU,DU by  at 7/13/2022 1737    Acceptance, E,TB,D, VU,NR by  at 7/11/2022 1018   Family Eager, E,TB,D, VU,DU by  at 7/13/2022 1737                               User Key     Initials Effective Dates Name Provider Type Discipline     03/07/18 -  Jelena Harry PTA Physical Therapist Assistant PT     04/08/22 -  Gabriela Harding, PT Physical  Therapist PT              PT Recommendation and Plan     Plan of Care Reviewed With: patient, daughter  Outcome Evaluation: Pt very agreeable to PT session, RN ok'd even though pt in A-fib earlier and will be moving to monitored bed-no c/o per pt, amb 200ft fast-paced , HHA, cues to slow for safety; pt slightly impulsive during tfer OOB due to having urgency for BR; pt plans home when medically ready and dtr can stay initially if needed, and will check on regularly if not staying w/pt     Time Calculation:    PT Charges     Row Name 07/13/22 8456             Time Calculation    Start Time 1610  -      Stop Time 1633  -      Time Calculation (min) 23 min  -      PT Received On 07/13/22  -SULEMA      PT - Next Appointment 07/15/22  -SULEMA            User Key  (r) = Recorded By, (t) = Taken By, (c) = Cosigned By    Initials Name Provider Type    Jelena Ronquillo PTA Physical Therapist Assistant              Therapy Charges for Today     Code Description Service Date Service Provider Modifiers Qty    [id number] HC PT THER PROC EA 15 MIN 7/13/2022 Jelena Harry PTA GP 2          PT G-Codes  Outcome Measure Options: AM-PAC 6 Clicks Basic Mobility (PT)  AM-PAC 6 Clicks Score (PT): 20    Jelena Harry PTA  7/13/2022

## 2022-07-13 NOTE — CONSULTS
Date of Hospital Visit: 2022  Encounter Provider: Kristopher Hameed MD  Place of Service: [facility] CARDIOLOGY  Patient Name: Sirisha Auguste  :1935  Referral Provider: Willi Byrd MD    Chief complaint abdominal pain     History of Present Illness Sirisha Auguste is a 86 year old pt with a history of  HLD, HTN, GERD and migraines.  An ECHO in  showed showed EF 65% and normal LVSF.       Pt was seen in  by Dr. Glaser for follow up of shortness of breath and HTN. Pt had been seen in  and followed up with pulmonary for shortness of breath. Pt went through extensive testing and was recommended to follow up in 6 months with cardiology but did not. She had been having ongoing shortness of breath since then. She denied any orthopnea, PND, cough, dizziness, syncope, falls, swelling or change in weight. Pt was to have an ECHO and a stress test. A stress test on 2015 was normal. An ECHO on 10/29/15 showed normal LVSF.     Pt presented to ER with complaints of generalized cramping and abdominal pain that is non radiating. Pt reported the symptoms have been ongoing for the past 2 weeks but has gotten worse in the past couple of days. Pt was in ER 6 days ago for similar symptoms and was diagnosed with a UTI and a mass within the sigmoid colon. Pt reported since being discharged she has been having difficulty with bowel movements and feels constipated. Pt reported her bowel movements have been mushy, sticky and thick. Pt denied any dark, or bloody stools. Pt also denied any fevers or chills. Pt has had some nausea. Pt has been on Cefdinir for UTI. In ER, lipase WNL, WBC WNL, BMP unremarkable, UA showed bacteria 1 + and , CT scan of ABD and pelvis showed worsening of colitis. Pt was admitted and GI has been consulted.     An EGD was performed on  and showed a small hiatal hernia. Pt was scheduled for colonoscopy today. In ENDO, EKG showed new Afib and pt was  given IV metoprolol and requested cardiology consult.  Patient was asymptomatic from the arrhythmia.  At time of interview she is in sinus rhythm.  She denies chest pain, shortness of air or palpitations.  No dizziness syncope.  No orthopnea, PND or edema.    HPI was reviewed, updated and amended when necessary.    ECHO 7/16/21    · Left ventricular wall thickness is consistent with moderate concentric hypertrophy.  · Estimated left ventricular EF = 65% Left ventricular systolic function is normal.  · Left ventricular diastolic function is consistent with (grade I) impaired relaxation.  · Left atrial volume is moderately increased.                 Past Medical History:   Diagnosis Date   • Arthritis    • Breast cyst    • GERD (gastroesophageal reflux disease)    • Hyperglycemia    • Hyperlipidemia    • Hypertension    • Migraines    • Murmur    • RBBB        Past Surgical History:   Procedure Laterality Date   • BLADDER SURGERY     • BREAST CYST ASPIRATION     • HYSTERECTOMY     • OOPHORECTOMY         Medications Prior to Admission   Medication Sig Dispense Refill Last Dose   • ALPRAZolam (XANAX) 0.5 MG tablet TAKE 1 TABLET AT NIGHT AS NEEDED FOR ANXIETY 90 tablet 1    • esomeprazole (nexIUM) 40 MG capsule TAKE 1 CAPSULE EVERY MORNING BEFORE BREAKFAST 90 capsule 3    • ezetimibe-simvastatin (VYTORIN) 10-20 MG per tablet TAKE 1 TABLET EVERY NIGHT 90 tablet 3    • Multiple Minerals-Vitamins (ADVANCED CALCIUM/D/MAGNESIUM) tablet Take  by mouth.      • SUMAtriptan (IMITREX) 100 MG tablet TAKE 1 TABLET AT ONSET OF HEADACHE. MAY REPEAT DOSE ONE TIME IN 2 HOURS IF HEADACHE NOT RELIEVED 810 tablet 3    • aspirin 81 MG EC tablet Take 81 mg by mouth Daily.      • cefdinir (OMNICEF) 300 MG capsule Take 1 capsule by mouth 2 (Two) Times a Day. 10 capsule 0    • diclofenac (VOLTAREN) 1 % gel gel Apply 4 g topically 4 (Four) Times a Day As Needed.      • estradiol (ESTRACE) 0.1 MG/GM vaginal cream Insert 2 g into the vagina Daily.       • meclizine (ANTIVERT) 25 MG tablet Take 1 tablet by mouth 3 (Three) Times a Day As Needed for dizziness. 180 tablet 1    • metoprolol succinate XL (TOPROL-XL) 50 MG 24 hr tablet TAKE 1 TABLET DAILY (Patient taking differently: Take 75 mg by mouth Daily.) 90 tablet 3    • montelukast (SINGULAIR) 10 MG tablet TAKE 1 TABLET EVERY NIGHT 90 tablet 3    • Probiotic Product (PROBIOTIC PO) Take  by mouth Every Other Day.      • promethazine-codeine (PHENERGAN with CODEINE) 6.25-10 MG/5ML syrup Take 5 mL by mouth Every 4 (Four) Hours As Needed for Cough. 180 mL 0    • triamterene-hydrochlorothiazide (MAXZIDE-25) 37.5-25 MG per tablet TAKE 1 TABLET DAILY (Patient taking differently: TAKE 1/2 TABLET DAILY) 90 tablet 3        Current Meds  Scheduled Meds:amLODIPine, 2.5 mg, Oral, Q24H  atorvastatin, 10 mg, Oral, Nightly  bisacodyl, 20 mg, Oral, Once  enoxaparin, 40 mg, Subcutaneous, Q24H  lactobacillus acidophilus, 1 capsule, Oral, Daily  metoprolol succinate XL, 50 mg, Oral, Daily  montelukast, 10 mg, Oral, Nightly  pantoprazole, 40 mg, Oral, QAM  piperacillin-tazobactam, 3.375 g, Intravenous, Q8H  senna-docusate sodium, 2 tablet, Oral, BID  sodium chloride, 10 mL, Intravenous, Q12H  triamterene-hydrochlorothiazide, 1 tablet, Oral, Daily      Continuous Infusions:lactated ringers, 100 mL/hr, Last Rate: Stopped (07/13/22 0907)      PRN Meds:.•  acetaminophen **OR** acetaminophen **OR** acetaminophen  •  HYDROmorphone  •  meclizine  •  ondansetron  •  potassium chloride **OR** potassium chloride **OR** potassium chloride  •  sodium chloride  •  sodium chloride  •  SUMAtriptan    Allergies as of 07/10/2022 - Reviewed 07/10/2022   Allergen Reaction Noted   • Sulfa antibiotics Other (See Comments) 06/07/2016       Social History     Socioeconomic History   • Marital status:    Tobacco Use   • Smoking status: Former Smoker   • Smokeless tobacco: Never Used   • Tobacco comment: When she was 18   Substance and Sexual  "Activity   • Alcohol use: Never   • Drug use: No   • Sexual activity: Not Currently       Family History   Problem Relation Age of Onset   • Cancer Sister         breast   • Breast cancer Sister    • Cancer Maternal Aunt         breast   • Breast cancer Maternal Aunt      Past surgical, medical, social family history was reviewed, updated and amended when necessary.    Review of Systems   Constitutional: Negative for chills and fever.   HENT: Negative for hoarse voice and sore throat.    Eyes: Negative for double vision and photophobia.   Cardiovascular: Negative for chest pain, leg swelling, near-syncope, orthopnea, palpitations, paroxysmal nocturnal dyspnea and syncope.   Respiratory: Negative for cough and wheezing.    Skin: Negative for poor wound healing and rash.   Musculoskeletal: Negative for arthritis and joint swelling.   Gastrointestinal: Positive for abdominal pain. Negative for bloating, hematemesis and hematochezia.   Neurological: Negative for dizziness and focal weakness.   Psychiatric/Behavioral: Negative for depression and suicidal ideas.            Objective:   Temp:  [96.8 °F (36 °C)-98.1 °F (36.7 °C)] 96.8 °F (36 °C)  Heart Rate:  [] 141  Resp:  [16-18] 16  BP: (124-186)/() 124/100  Body mass index is 24.33 kg/m².  Flowsheet Rows    Flowsheet Row First Filed Value   Admission Height 157.5 cm (62\") Documented at 07/13/2022 1012   Admission Weight 60.7 kg (133 lb 13.1 oz) Documented at 07/10/2022 1524        Vitals:    07/13/22 1029   BP: 124/100   Pulse: (!) 141   Resp:    Temp:    SpO2:        Vitals reviewed.   Constitutional:       Appearance: Healthy appearance. Not in distress.   Eyes:      Conjunctiva/sclera: Conjunctivae normal.      Pupils: Pupils are equal, round, and reactive to light.   Neck:      Vascular: No JVR. JVD normal.   Pulmonary:      Effort: Pulmonary effort is normal.      Breath sounds: Normal breath sounds. No wheezing. No rhonchi. No rales.   Chest:      " Chest wall: Not tender to palpatation.   Cardiovascular:      PMI at left midclavicular line. Normal rate. Regular rhythm. Normal S1. Normal S2.      Murmurs: There is no murmur.      No gallop. No click. No rub.   Pulses:     Intact distal pulses.   Edema:     Peripheral edema absent.   Abdominal:      General: Bowel sounds are normal.      Palpations: Abdomen is soft.      Tenderness: There is no abdominal tenderness.   Musculoskeletal: Normal range of motion.         General: No tenderness. Skin:     General: Skin is warm and dry.   Neurological:      General: No focal deficit present.      Mental Status: Alert and oriented to person, place and time.                 Lab Review:      Results from last 7 days   Lab Units 07/13/22  0710 07/12/22  0554 07/11/22  0535   SODIUM mmol/L 129*   < > 136   POTASSIUM mmol/L 3.5   < > 3.8   CHLORIDE mmol/L 96*   < > 103   CO2 mmol/L 20.4*   < > 24.0   BUN mg/dL 7*   < > 10   CREATININE mg/dL 0.69   < > 0.66   CALCIUM mg/dL 9.3   < > 9.6   BILIRUBIN mg/dL  --   --  0.8   ALK PHOS U/L  --   --  64   ALT (SGPT) U/L  --   --  6   AST (SGOT) U/L  --   --  15   GLUCOSE mg/dL 104*   < > 102*    < > = values in this interval not displayed.         @LABRCNTbnp@  Results from last 7 days   Lab Units 07/13/22  0710 07/12/22  0554 07/11/22  0535   WBC 10*3/mm3 9.72 6.10 9.58   HEMOGLOBIN g/dL 12.5 12.1 11.8*   HEMATOCRIT % 36.1 35.1 35.1   PLATELETS 10*3/mm3 196 185 185             @LABRCNTIP(chol,trig,hdl,ldl)                        I personally viewed and interpreted the patient's EKG/Telemetry data  )  Patient Active Problem List   Diagnosis   • Benign essential hypertension   • Borderline hyperglycemia   • Gastroesophageal reflux disease   • Hyperlipidemia   • Migraine   • Heart murmur   • Right fascicular block   • Acute thoracic back pain   • Dyspnea on exertion   • Recurrent vertigo   • Generalized osteoarthritis of multiple sites   • Diastolic dysfunction   • Chronic fatigue   •  Mild concentric left ventricular hypertrophy (LVH)   • Hypercalcemia   • Hematoma   • Generalized anxiety disorder   • Chronic migraine without aura without status migrainosus, not intractable   • RBBB   • Colitis   • Abdominal pain   • UTI (urinary tract infection)   • Nausea and vomiting   • Black stool   • Abnormal CT of the abdomen     Assessment and Plan:    1. New onset atrial fibrillation -Case discussed with Dr. Hernandez.  Electrolytes ordered.  Magnesium is low will be replaced.  TSH was within normal limits.  Patient is back in sinus rhythm.  Recommend avoiding anticoagulation at this time.  Continue beta-blocker.  Hopefully she can get colonoscopy tomorrow.  Recent echocardiogram with normal LV function.  No indication for further cardiac testing at this time.  2. Diastolic dysfunction -euvolemic with no symptoms consistent with heart failure.  Continue to monitor closely.  Blood pressure is labile.  We will follow closely.  3. Colitis -we will discuss with GI.    Kristopher Hameed MD  07/13/22  10:43 EDT.  Time spent in reviewing chart, discussion and examination:

## 2022-07-13 NOTE — PLAN OF CARE
Goal Outcome Evaluation:  Plan of Care Reviewed With: patient        Progress: improving  Outcome Evaluation: Transfer from , patient in SR upon arrival to unit. Patient and family hoping to have colonoscopy tomorrow, cardio did round on patient, no note in computer yet. Still with urgent BMs.

## 2022-07-13 NOTE — CONSULTS
Referring Provider: Willi Byrd MD  Reason for Consultation: Colitis and positive blood culture  Chief Complaint   Patient presents with   • Abdominal Pain         Subjective   History of present illness: Patient is an 86-year-old female who presents in the setting of abdominal pain.  ID was consulted for positive blood culture and colitis.    Patient originally presented to the ER on 7/4 with abdominal pain that has been going on for approximately a week and a half.  At that time she reported dark stools.  Labs at that time showed normal lactic acid, procalcitonin and blood cultures.  Urine culture was obtained due to abnormal UA and this isolated Citrobacter.  She was discharged with cefdinir at that time.  Occult blood from the stool was also negative.  CT scan was done that showed irregularity within the sigmoid colonic wall with thickening and fat stranding with trace pelvic free fluid.  This was concerning for focal colitis or underlying lesion.    She now presents back to the hospital With worsening abdominal pain.  Blood cultures have been positive for bacillus species and GI pathogen panel is pending.  Fever curve is remained within normal limits and she has had no signs of leukocytosis.  She was started empirically on Zosyn.  Was planned to have colonoscopy by GI however was canceled due to new onset atrial fibrillation.    Past Medical History:   Diagnosis Date   • Arthritis    • Breast cyst    • GERD (gastroesophageal reflux disease)    • Hyperglycemia    • Hyperlipidemia    • Hypertension    • Migraines    • Murmur    • RBBB        Past Surgical History:   Procedure Laterality Date   • BLADDER SURGERY     • BREAST CYST ASPIRATION     • HYSTERECTOMY     • OOPHORECTOMY         family history includes Breast cancer in her maternal aunt and sister; Cancer in her maternal aunt and sister.     reports that she has quit smoking. She has never used smokeless tobacco. She reports that she does not drink alcohol  and does not use drugs.     Allergies   Allergen Reactions   • Sulfa Antibiotics Other (See Comments)     unknown       Medication:  Antibiotics:  Anti-Infectives (From admission, onward)    Ordered     Dose/Rate Route Frequency Start Stop    07/11/22 1556  piperacillin-tazobactam (ZOSYN) 3.375 g in iso-osmotic dextrose 50 ml (premix)        Ordering Provider: Yoselyn Gates MD    3.375 g  over 4 Hours Intravenous Every 8 Hours 07/11/22 2245 07/16/22 1759    07/11/22 1556  piperacillin-tazobactam (ZOSYN) 3.375 g in iso-osmotic dextrose 50 ml (premix)        Ordering Provider: Rocio Farmer MD    3.375 g  over 30 Minutes Intravenous Once 07/11/22 1645 07/11/22 1917    07/10/22 1342  ampicillin-sulbactam (UNASYN) 3 g in sodium chloride 0.9 % 100 mL IVPB-VTB        Ordering Provider: Ben Noguera II, MD    3 g Intravenous Once 07/10/22 1344 07/10/22 1756          Review of Systems  Pertinent items are noted in HPI, all other systems reviewed and negative    Objective     Physical Exam:   Vital Signs   Temp:  [96.8 °F (36 °C)-98.1 °F (36.7 °C)] 97.7 °F (36.5 °C)  Heart Rate:  [] 97  Resp:  [16-18] 16  BP: (115-177)/() 125/84    GENERAL: Awake and alert, in no acute distress.   HEENT: Oropharynx is clear. Hearing is grossly normal.   EYES: PERRL. No conjunctival injection. No lid lag.   LYMPHATICS: No lymphadenopathy of the neck or inguinal regions.   HEART: Regular rate and regular rhythm. No peripheral edema.   LUNGS: Clear to auscultation anteriorly with normal respiratory effort.   GI: Soft, nontender, nondistended. No appreciable organomegaly.   SKIN: Warm and dry without cutaneous eruptions   PSYCHIATRIC: Appropriate mood, affect, insight, and judgment.     Results Review:   I reviewed the patient's new clinical results.  I reviewed the patient's new imaging results and agree with the interpretation.  I reviewed the patient's other test results and agree with the interpretation    Lab  Results   Component Value Date    WBC 9.72 07/13/2022    HGB 12.5 07/13/2022    HCT 36.1 07/13/2022    MCV 88.9 07/13/2022     07/13/2022       No results found for: FRANCESCA MILES VANCORANDOM    Lab Results   Component Value Date    GLUCOSE 104 (H) 07/13/2022    BUN 7 (L) 07/13/2022    CREATININE 0.69 07/13/2022    EGFRIFNONA 63 05/20/2021    EGFRIFAFRI 76 05/20/2021    BCR 10.1 07/13/2022    CO2 20.4 (L) 07/13/2022    CALCIUM 9.3 07/13/2022    PROTENTOTREF 7.2 05/20/2021    ALBUMIN 3.60 07/11/2022    LABIL2 1.9 05/20/2021    AST 15 07/11/2022    ALT 6 07/11/2022         Estimated Creatinine Clearance: 50.1 mL/min (by C-G formula based on SCr of 0.69 mg/dL).      Microbiology:  7/4 blood cultures no growth to date  7/4 urine culture Citrobacter  7/10 blood culture 1 out of 2 positive for bacillus species  7/10 COVID-negative  7/13 GI pathogen panel in process    Radiology:  7/4 CT of the abdomen report reviewed with focal area of colonic wall thickening with associated stranding.  Concerning for focal colitis versus lesion.    7/10 CT of the abdomen report reviewed with slightly progressive symptoms of focal colitis of the proximal sigmoid and retention of stool throughout the colon.    Assessment   #Bacillus bacteremia  #Sigmoid colitis  #New onset atrial fibrillation    1 out of 2 blood cultures positive for bacillus species and in 7 situations this can be considered contamination however in this current case it seems more plausible to be true bacteremia.  Patient has colitis exhibited on her imaging and given the indolent nature of this bacteria does seem possible that this could be true infection.      Suggest treating as if it were indeed infection and we will transition the patient to oral levofloxacin 750 mg daily plus oral Flagyl 500 mg 3 times daily to cover for both the bacteremia and colitis.  Plan a 7-day course at this point to cover both and agree with GI that she likely will need  colonoscopy as this bacteremia can be a harbinger of malignancy in some cases.    Thank you for allowing me to be involved in the care of this patient. Infectious diseases will sign off at this time with antibiotics plan in place, but please call me at [phone] if any further ID questions or new ID concerns.

## 2022-07-14 ENCOUNTER — ANESTHESIA EVENT (OUTPATIENT)
Dept: GASTROENTEROLOGY | Facility: HOSPITAL | Age: 87
End: 2022-07-14

## 2022-07-14 ENCOUNTER — TELEPHONE (OUTPATIENT)
Dept: GASTROENTEROLOGY | Facility: CLINIC | Age: 87
End: 2022-07-14

## 2022-07-14 ENCOUNTER — ANESTHESIA (OUTPATIENT)
Dept: GASTROENTEROLOGY | Facility: HOSPITAL | Age: 87
End: 2022-07-14

## 2022-07-14 ENCOUNTER — APPOINTMENT (OUTPATIENT)
Dept: CARDIOLOGY | Facility: HOSPITAL | Age: 87
End: 2022-07-14

## 2022-07-14 PROBLEM — A04.4 E. COLI COLITIS: Status: ACTIVE | Noted: 2022-07-14

## 2022-07-14 LAB
ANION GAP SERPL CALCULATED.3IONS-SCNC: 11 MMOL/L (ref 5–15)
AORTIC DIMENSIONLESS INDEX: 1 (DI)
AV HCM GRAD VALS: 28 MMHG
AV LVOT PEAK GRADIENT: 9 MMHG
BASOPHILS # BLD AUTO: 0.02 10*3/MM3 (ref 0–0.2)
BASOPHILS NFR BLD AUTO: 0.4 % (ref 0–1.5)
BH CV ECHO MEAS - ACS: 2.03 CM
BH CV ECHO MEAS - AO MAX PG: 12 MMHG
BH CV ECHO MEAS - AO MEAN PG: 6.3 MMHG
BH CV ECHO MEAS - AO ROOT DIAM: 3.7 CM
BH CV ECHO MEAS - AO V2 MAX: 173.6 CM/SEC
BH CV ECHO MEAS - AO V2 VTI: 39.1 CM
BH CV ECHO MEAS - AVA(I,D): 3.3 CM2
BH CV ECHO MEAS - EDV(CUBED): 44.7 ML
BH CV ECHO MEAS - EDV(MOD-SP2): 63 ML
BH CV ECHO MEAS - EDV(MOD-SP4): 66 ML
BH CV ECHO MEAS - EF(MOD-BP): 67 %
BH CV ECHO MEAS - EF(MOD-SP2): 66.7 %
BH CV ECHO MEAS - EF(MOD-SP4): 71.2 %
BH CV ECHO MEAS - ESV(CUBED): 8.8 ML
BH CV ECHO MEAS - ESV(MOD-SP2): 21 ML
BH CV ECHO MEAS - ESV(MOD-SP4): 19 ML
BH CV ECHO MEAS - FS: 41.8 %
BH CV ECHO MEAS - IVS/LVPW: 1.47 CM
BH CV ECHO MEAS - IVSD: 1.85 CM
BH CV ECHO MEAS - LAT PEAK E' VEL: 6.3 CM/SEC
BH CV ECHO MEAS - LV MASS(C)D: 208.8 GRAMS
BH CV ECHO MEAS - LV MAX PG: 9.8 MMHG
BH CV ECHO MEAS - LV MEAN PG: 6.2 MMHG
BH CV ECHO MEAS - LV V1 MAX: 156.7 CM/SEC
BH CV ECHO MEAS - LV V1 VTI: 40 CM
BH CV ECHO MEAS - LVIDD: 3.5 CM
BH CV ECHO MEAS - LVIDS: 2.07 CM
BH CV ECHO MEAS - LVOT AREA: 3.2 CM2
BH CV ECHO MEAS - LVOT DIAM: 2.03 CM
BH CV ECHO MEAS - LVPWD: 1.26 CM
BH CV ECHO MEAS - MED PEAK E' VEL: 4.1 CM/SEC
BH CV ECHO MEAS - MV A DUR: 0.19 SEC
BH CV ECHO MEAS - MV A MAX VEL: 133.3 CM/SEC
BH CV ECHO MEAS - MV DEC SLOPE: 401.1 CM/SEC2
BH CV ECHO MEAS - MV DEC TIME: 370 MSEC
BH CV ECHO MEAS - MV E MAX VEL: 114 CM/SEC
BH CV ECHO MEAS - MV E/A: 0.86
BH CV ECHO MEAS - MV MAX PG: 8 MMHG
BH CV ECHO MEAS - MV MEAN PG: 3.4 MMHG
BH CV ECHO MEAS - MV P1/2T: 106.1 MSEC
BH CV ECHO MEAS - MV V2 VTI: 47.6 CM
BH CV ECHO MEAS - MVA(P1/2T): 2.07 CM2
BH CV ECHO MEAS - MVA(VTI): 2.7 CM2
BH CV ECHO MEAS - PA ACC TIME: 0.07 SEC
BH CV ECHO MEAS - PA PR(ACCEL): 45.7 MMHG
BH CV ECHO MEAS - PA V2 MAX: 97.3 CM/SEC
BH CV ECHO MEAS - PULM A REVS DUR: 0.12 SEC
BH CV ECHO MEAS - PULM A REVS VEL: 22.3 CM/SEC
BH CV ECHO MEAS - PULM DIAS VEL: 34.3 CM/SEC
BH CV ECHO MEAS - PULM S/D: 1.54
BH CV ECHO MEAS - PULM SYS VEL: 52.7 CM/SEC
BH CV ECHO MEAS - RAP SYSTOLE: 3 MMHG
BH CV ECHO MEAS - RV MAX PG: 2.8 MMHG
BH CV ECHO MEAS - RV V1 MAX: 83.1 CM/SEC
BH CV ECHO MEAS - RV V1 VTI: 17.6 CM
BH CV ECHO MEAS - RVSP: 40 MMHG
BH CV ECHO MEAS - SV(LVOT): 129.5 ML
BH CV ECHO MEAS - SV(MOD-SP2): 42 ML
BH CV ECHO MEAS - SV(MOD-SP4): 47 ML
BH CV ECHO MEAS - TAPSE (>1.6): 2.1 CM
BH CV ECHO MEAS - TR MAX PG: 36.6 MMHG
BH CV ECHO MEAS - TR MAX VEL: 302.4 CM/SEC
BH CV ECHO MEASUREMENTS AVERAGE E/E' RATIO: 21.92
BH CV VAS BP RIGHT ARM: NORMAL MMHG
BH CV XLRA - RV BASE: 3 CM
BH CV XLRA - RV LENGTH: 5.6 CM
BH CV XLRA - RV MID: 2.9 CM
BH CV XLRA - TDI S': 10.8 CM/SEC
BUN SERPL-MCNC: 5 MG/DL (ref 8–23)
BUN/CREAT SERPL: 6.8 (ref 7–25)
CALCIUM SPEC-SCNC: 9.6 MG/DL (ref 8.6–10.5)
CHLORIDE SERPL-SCNC: 99 MMOL/L (ref 98–107)
CO2 SERPL-SCNC: 22 MMOL/L (ref 22–29)
CREAT SERPL-MCNC: 0.73 MG/DL (ref 0.57–1)
DEPRECATED RDW RBC AUTO: 40.1 FL (ref 37–54)
EGFRCR SERPLBLD CKD-EPI 2021: 80.2 ML/MIN/1.73
EOSINOPHIL # BLD AUTO: 0.12 10*3/MM3 (ref 0–0.4)
EOSINOPHIL NFR BLD AUTO: 2.6 % (ref 0.3–6.2)
ERYTHROCYTE [DISTWIDTH] IN BLOOD BY AUTOMATED COUNT: 12.4 % (ref 12.3–15.4)
GLUCOSE SERPL-MCNC: 96 MG/DL (ref 65–99)
HCT VFR BLD AUTO: 35.6 % (ref 34–46.6)
HGB BLD-MCNC: 12.1 G/DL (ref 12–15.9)
IMM GRANULOCYTES # BLD AUTO: 0.01 10*3/MM3 (ref 0–0.05)
IMM GRANULOCYTES NFR BLD AUTO: 0.2 % (ref 0–0.5)
INR PPP: 1.18 (ref 0.9–1.1)
LAB AP CASE REPORT: NORMAL
LEFT ATRIUM VOLUME INDEX: 71.4 ML/M2
LYMPHOCYTES # BLD AUTO: 0.77 10*3/MM3 (ref 0.7–3.1)
LYMPHOCYTES NFR BLD AUTO: 16.6 % (ref 19.6–45.3)
MAGNESIUM SERPL-MCNC: 3.1 MG/DL (ref 1.6–2.4)
MAXIMAL PREDICTED HEART RATE: 134 BPM
MCH RBC QN AUTO: 30.2 PG (ref 26.6–33)
MCHC RBC AUTO-ENTMCNC: 34 G/DL (ref 31.5–35.7)
MCV RBC AUTO: 88.8 FL (ref 79–97)
MONOCYTES # BLD AUTO: 0.42 10*3/MM3 (ref 0.1–0.9)
MONOCYTES NFR BLD AUTO: 9.1 % (ref 5–12)
NEUTROPHILS NFR BLD AUTO: 3.29 10*3/MM3 (ref 1.7–7)
NEUTROPHILS NFR BLD AUTO: 71.1 % (ref 42.7–76)
NRBC BLD AUTO-RTO: 0 /100 WBC (ref 0–0.2)
PATH REPORT.FINAL DX SPEC: NORMAL
PATH REPORT.GROSS SPEC: NORMAL
PLATELET # BLD AUTO: 194 10*3/MM3 (ref 140–450)
PMV BLD AUTO: 11.4 FL (ref 6–12)
POTASSIUM SERPL-SCNC: 4.1 MMOL/L (ref 3.5–5.2)
PROTHROMBIN TIME: 14.9 SECONDS (ref 11.7–14.2)
QT INTERVAL: 444 MS
RBC # BLD AUTO: 4.01 10*6/MM3 (ref 3.77–5.28)
SINUS: 3.4 CM
SODIUM SERPL-SCNC: 132 MMOL/L (ref 136–145)
STJ: 2.3 CM
STRESS TARGET HR: 114 BPM
WBC NRBC COR # BLD: 4.63 10*3/MM3 (ref 3.4–10.8)

## 2022-07-14 PROCEDURE — 99232 SBSQ HOSP IP/OBS MODERATE 35: CPT | Performed by: NURSE PRACTITIONER

## 2022-07-14 PROCEDURE — 88305 TISSUE EXAM BY PATHOLOGIST: CPT | Performed by: INTERNAL MEDICINE

## 2022-07-14 PROCEDURE — 99231 SBSQ HOSP IP/OBS SF/LOW 25: CPT | Performed by: INTERNAL MEDICINE

## 2022-07-14 PROCEDURE — 85025 COMPLETE CBC W/AUTO DIFF WBC: CPT | Performed by: INTERNAL MEDICINE

## 2022-07-14 PROCEDURE — 25010000002 PROPOFOL 10 MG/ML EMULSION: Performed by: ANESTHESIOLOGY

## 2022-07-14 PROCEDURE — 93306 TTE W/DOPPLER COMPLETE: CPT | Performed by: INTERNAL MEDICINE

## 2022-07-14 PROCEDURE — 93306 TTE W/DOPPLER COMPLETE: CPT

## 2022-07-14 PROCEDURE — 25010000002 ENOXAPARIN PER 10 MG: Performed by: INTERNAL MEDICINE

## 2022-07-14 PROCEDURE — 83735 ASSAY OF MAGNESIUM: CPT | Performed by: INTERNAL MEDICINE

## 2022-07-14 PROCEDURE — 25010000002 PHENYLEPHRINE 10 MG/ML SOLUTION: Performed by: ANESTHESIOLOGY

## 2022-07-14 PROCEDURE — 45380 COLONOSCOPY AND BIOPSY: CPT | Performed by: INTERNAL MEDICINE

## 2022-07-14 PROCEDURE — 80048 BASIC METABOLIC PNL TOTAL CA: CPT | Performed by: INTERNAL MEDICINE

## 2022-07-14 PROCEDURE — 85610 PROTHROMBIN TIME: CPT | Performed by: PHYSICIAN ASSISTANT

## 2022-07-14 PROCEDURE — 0DBE8ZX EXCISION OF LARGE INTESTINE, VIA NATURAL OR ARTIFICIAL OPENING ENDOSCOPIC, DIAGNOSTIC: ICD-10-PCS | Performed by: INTERNAL MEDICINE

## 2022-07-14 RX ORDER — EPHEDRINE SULFATE 50 MG/ML
INJECTION, SOLUTION INTRAVENOUS AS NEEDED
Status: DISCONTINUED | OUTPATIENT
Start: 2022-07-14 | End: 2022-07-14 | Stop reason: SURG

## 2022-07-14 RX ORDER — PHENYLEPHRINE HYDROCHLORIDE 10 MG/ML
INJECTION INTRAVENOUS AS NEEDED
Status: DISCONTINUED | OUTPATIENT
Start: 2022-07-14 | End: 2022-07-14 | Stop reason: SURG

## 2022-07-14 RX ORDER — PROPOFOL 10 MG/ML
VIAL (ML) INTRAVENOUS CONTINUOUS PRN
Status: DISCONTINUED | OUTPATIENT
Start: 2022-07-14 | End: 2022-07-14 | Stop reason: SURG

## 2022-07-14 RX ORDER — SODIUM CHLORIDE 9 MG/ML
30 INJECTION, SOLUTION INTRAVENOUS CONTINUOUS PRN
Status: DISCONTINUED | OUTPATIENT
Start: 2022-07-14 | End: 2022-07-15 | Stop reason: HOSPADM

## 2022-07-14 RX ORDER — PROPOFOL 10 MG/ML
VIAL (ML) INTRAVENOUS AS NEEDED
Status: DISCONTINUED | OUTPATIENT
Start: 2022-07-14 | End: 2022-07-14 | Stop reason: SURG

## 2022-07-14 RX ORDER — LIDOCAINE HYDROCHLORIDE 20 MG/ML
INJECTION, SOLUTION INFILTRATION; PERINEURAL AS NEEDED
Status: DISCONTINUED | OUTPATIENT
Start: 2022-07-14 | End: 2022-07-14 | Stop reason: SURG

## 2022-07-14 RX ORDER — ALPRAZOLAM 0.5 MG/1
0.5 TABLET ORAL NIGHTLY PRN
Status: DISCONTINUED | OUTPATIENT
Start: 2022-07-14 | End: 2022-07-15 | Stop reason: HOSPADM

## 2022-07-14 RX ORDER — NITROGLYCERIN 0.4 MG/1
0.4 TABLET SUBLINGUAL
Status: DISCONTINUED | OUTPATIENT
Start: 2022-07-14 | End: 2022-07-15 | Stop reason: HOSPADM

## 2022-07-14 RX ADMIN — LIDOCAINE HYDROCHLORIDE 50 MG: 20 INJECTION, SOLUTION INFILTRATION; PERINEURAL at 14:18

## 2022-07-14 RX ADMIN — METRONIDAZOLE 500 MG: 500 TABLET, FILM COATED ORAL at 06:17

## 2022-07-14 RX ADMIN — ATORVASTATIN CALCIUM 10 MG: 20 TABLET, FILM COATED ORAL at 20:53

## 2022-07-14 RX ADMIN — METOPROLOL TARTRATE 1 MG: 5 INJECTION, SOLUTION INTRAVENOUS at 14:18

## 2022-07-14 RX ADMIN — PROPOFOL 100 MG: 10 INJECTION, EMULSION INTRAVENOUS at 14:18

## 2022-07-14 RX ADMIN — METRONIDAZOLE 500 MG: 500 TABLET, FILM COATED ORAL at 20:53

## 2022-07-14 RX ADMIN — Medication 10 ML: at 11:31

## 2022-07-14 RX ADMIN — PHENYLEPHRINE HYDROCHLORIDE 100 MCG: 10 INJECTION INTRAVENOUS at 14:37

## 2022-07-14 RX ADMIN — METOPROLOL SUCCINATE 75 MG: 25 TABLET, EXTENDED RELEASE ORAL at 11:29

## 2022-07-14 RX ADMIN — Medication 10 ML: at 20:53

## 2022-07-14 RX ADMIN — SODIUM CHLORIDE 30 ML/HR: 9 INJECTION, SOLUTION INTRAVENOUS at 13:27

## 2022-07-14 RX ADMIN — Medication 200 MCG/KG/MIN: at 14:18

## 2022-07-14 RX ADMIN — MONTELUKAST SODIUM 10 MG: 10 TABLET, FILM COATED ORAL at 20:53

## 2022-07-14 RX ADMIN — ENOXAPARIN SODIUM 40 MG: 100 INJECTION SUBCUTANEOUS at 17:42

## 2022-07-14 RX ADMIN — PANTOPRAZOLE SODIUM 40 MG: 40 TABLET, DELAYED RELEASE ORAL at 06:17

## 2022-07-14 RX ADMIN — METRONIDAZOLE 500 MG: 500 TABLET, FILM COATED ORAL at 15:21

## 2022-07-14 RX ADMIN — LEVOFLOXACIN 750 MG: 750 TABLET, FILM COATED ORAL at 17:42

## 2022-07-14 RX ADMIN — EPHEDRINE SULFATE 10 MG: 50 INJECTION INTRAVENOUS at 14:37

## 2022-07-14 NOTE — ANESTHESIA POSTPROCEDURE EVALUATION
Patient: Sirisha Auguste    Procedure Summary     Date: 07/14/22 Room / Location:  [facility]    Anesthesia Start: 1330 Anesthesia Stop: 1439    Procedure: COLONOSCOPY TO 20CM WITH COLD BIOPSIES (N/A ) Diagnosis:       Colitis      Abnormal CT of the abdomen      (Colitis [K52.9])      (Abnormal CT of the abdomen [R93.5])    Surgeons: Henrik Gaston MD Provider: Sher Muniz MD    Anesthesia Type: MAC ASA Status: 3          Anesthesia Type: MAC    Vitals  Vitals Value Taken Time   /67 07/14/22 1437   Temp     Pulse 60 07/14/22 1437   Resp 16 07/14/22 1437   SpO2 95 % 07/14/22 1437           Post Anesthesia Care and Evaluation    Patient location during evaluation: PHASE II  Patient participation: complete - patient participated  Level of consciousness: awake and alert  Pain management: adequate    Airway patency: patent  Anesthetic complications: No anesthetic complications  PONV Status: none  Cardiovascular status: acceptable and hemodynamically stable  Respiratory status: acceptable, nonlabored ventilation and spontaneous ventilation  Hydration status: acceptable

## 2022-07-14 NOTE — PROGRESS NOTES
[facility]  Inpatient Progress Note    Reason for Follow Up: Colitis    Subjective     Interval History:   Reviewed cardiology note, cleared for colonoscopy.  Discussed with RN, patient off the floor for echocardiogram.  Not sure if patient would be adequately prepped for examination today as RN reports stools were not clear yesterday.  Will await patient's decision on further prep today versus standard prep with examination tomorrow.  Nurse practitioner to see patient to review this and communicate with family.    Current Facility-Administered Medications:   •  acetaminophen (TYLENOL) tablet 650 mg, 650 mg, Oral, Q4H PRN, 650 mg at 07/13/22 1253 **OR** acetaminophen (TYLENOL) 160 MG/5ML solution 650 mg, 650 mg, Oral, Q4H PRN **OR** acetaminophen (TYLENOL) suppository 650 mg, 650 mg, Rectal, Q4H PRN, Yoselyn Gates MD  •  ALPRAZolam (XANAX) tablet 0.5 mg, 0.5 mg, Oral, Nightly PRN, Jelena Powers, APRN  •  atorvastatin (LIPITOR) tablet 10 mg, 10 mg, Oral, Nightly, Yoselyn Gates MD, 10 mg at 07/13/22 2023  •  bisacodyl (DULCOLAX) EC tablet 20 mg, 20 mg, Oral, Once, Yoselyn Gates MD  •  Enoxaparin Sodium (LOVENOX) syringe 40 mg, 40 mg, Subcutaneous, Q24H, Yoselyn Gates MD, 40 mg at 07/13/22 1802  •  HYDROmorphone (DILAUDID) injection 0.5 mg, 0.5 mg, Intravenous, Q2H PRN, Yoselyn Gates MD  •  lactated ringers infusion, 100 mL/hr, Intravenous, Continuous, Yoselyn Gates MD, Last Rate: 100 mL/hr at 07/13/22 2152, 100 mL/hr at 07/13/22 2152  •  lactobacillus acidophilus (RISAQUAD) capsule 1 capsule, 1 capsule, Oral, Daily, Yoselyn Gates MD, 1 capsule at 07/12/22 0837  •  levoFLOXacin (LEVAQUIN) tablet 750 mg, 750 mg, Oral, Q24H, Pierre Iyer DO, 750 mg at 07/13/22 1803  •  Magnesium Sulfate 2 gram Bolus, followed by 8 gram infusion (total Mg dose 10 grams)- Mg less than or equal to 1mg/dL, 2 g, Intravenous, PRN **OR** Magnesium Sulfate 2 gram / 50mL  Infusion (GIVE X 3 BAGS TO EQUAL 6GM TOTAL DOSE) - Mg 1.1 - 1.5 mg/dl, 2 g, Intravenous, PRN, Last Rate: 25 mL/hr at 07/13/22 2249, 2 g at 07/13/22 2249 **OR** Magnesium Sulfate 4 gram infusion- Mg 1.6-1.9 mg/dL, 4 g, Intravenous, PRN, Rocio Farmer MD  •  meclizine (ANTIVERT) tablet 25 mg, 25 mg, Oral, TID PRN, Yoselyn Gates MD  •  metoprolol succinate XL (TOPROL-XL) 24 hr tablet 75 mg, 75 mg, Oral, Daily, Rocio Farmer MD  •  metroNIDAZOLE (FLAGYL) tablet 500 mg, 500 mg, Oral, Q8H, Pierre Iyer, DO, 500 mg at 07/14/22 0617  •  montelukast (SINGULAIR) tablet 10 mg, 10 mg, Oral, Nightly, Yoselyn Gates MD, 10 mg at 07/13/22 2023  •  nitroglycerin (NITROSTAT) SL tablet 0.4 mg, 0.4 mg, Sublingual, Q5 Min PRN, Rocio Farmer MD  •  ondansetron (ZOFRAN) injection 4 mg, 4 mg, Intravenous, Q6H PRN, Yoselyn Gates MD, 4 mg at 07/10/22 2110  •  pantoprazole (PROTONIX) EC tablet 40 mg, 40 mg, Oral, QAM, Yoselyn Gates MD, 40 mg at 07/14/22 0617  •  potassium chloride (K-DUR,KLOR-CON) ER tablet 40 mEq, 40 mEq, Oral, PRN, 40 mEq at 07/13/22 1241 **OR** potassium chloride (KLOR-CON) packet 40 mEq, 40 mEq, Oral, PRN **OR** potassium chloride 10 mEq in 100 mL IVPB, 10 mEq, Intravenous, Q1H PRN, Yoselyn Gates MD  •  sennosides-docusate (PERICOLACE) 8.6-50 MG per tablet 2 tablet, 2 tablet, Oral, BID, Yoselyn Gates MD, 2 tablet at 07/13/22 2023  •  sodium chloride 0.9 % flush 10 mL, 10 mL, Intravenous, PRN, Yoselyn Gates MD  •  sodium chloride 0.9 % flush 10 mL, 10 mL, Intravenous, Q12H, Yoselyn Gates MD, 10 mL at 07/13/22 2024  •  sodium chloride 0.9 % flush 10 mL, 10 mL, Intravenous, PRN, Yoselyn Gates MD  •  SUMAtriptan (IMITREX) tablet 100 mg, 100 mg, Oral, Q2H PRN, Yoselyn Gates MD, 100 mg at 07/11/22 1833  Review of Systems:    Review of systems could not be obtained due to  Patient in echo    Objective     Vital Signs  Temp:  [97.5 °F (36.4 °C)-98.5 °F (36.9  °C)] 98.5 °F (36.9 °C)  Heart Rate:  [] 74  Resp:  [16] 16  BP: (115-182)/() 182/99  Body mass index is 24.33 kg/m².    Intake/Output Summary (Last 24 hours) at 7/14/2022 0938  Last data filed at 7/14/2022 0741  Gross per 24 hour   Intake 100 ml   Output 800 ml   Net -700 ml     I/O this shift:  In: 0   Out: 800 [Urine:800]     Physical Exam:   General: patient awake, alert and cooperative   Eyes: Normal lids and lashes, no scleral icterus   Neck: supple, normal ROM   Skin: warm and dry, not jaundiced   Cardiovascular: regular rhythm and rate, no murmurs auscultated   Pulm: clear to auscultation bilaterally, regular and unlabored   Abdomen: soft, nontender, nondistended; normal bowel sounds   Extremities: no rash or edema   Psychiatric: Normal mood and behavior; memory intact     Results Review:     I reviewed the patient's new clinical results.    Results from last 7 days   Lab Units 07/14/22 0442 07/13/22  0710 07/12/22  0554   WBC 10*3/mm3 4.63 9.72 6.10   HEMOGLOBIN g/dL 12.1 12.5 12.1   HEMATOCRIT % 35.6 36.1 35.1   PLATELETS 10*3/mm3 194 196 185     Results from last 7 days   Lab Units 07/14/22 0442 07/13/22  0710 07/12/22  0554 07/11/22  0535 07/10/22  1028   SODIUM mmol/L 132* 129* 135* 136 136   POTASSIUM mmol/L 4.1 3.5 3.4* 3.8 4.0   CHLORIDE mmol/L 99 96* 101 103 98   CO2 mmol/L 22.0 20.4* 24.0 24.0 26.0   BUN mg/dL 5* 7* 8 10 13   CREATININE mg/dL 0.73 0.69 0.75 0.66 0.87   CALCIUM mg/dL 9.6 9.3 9.8 9.6 10.7*   BILIRUBIN mg/dL  --   --   --  0.8 0.8   ALK PHOS U/L  --   --   --  64 87   ALT (SGPT) U/L  --   --   --  6 9   AST (SGOT) U/L  --   --   --  15 19   GLUCOSE mg/dL 96 104* 107* 102* 115*         Lab Results   Lab Value Date/Time    LIPASE 33 07/10/2022 1028    LIPASE 33 07/04/2022 1145    LIPASE 35 02/14/2017 0112       Radiology:  CT Abdomen Pelvis With Contrast   Final Result   1. There are persistent and slightly progressive findings suggestive of   focal colitis of the proximal  sigmoid colon.   2. There is persistent and mildly progressive moderate retention of   stool throughout the colon.       This report was finalized on 7/10/2022 2:49 PM by Dr. Troy Cisneros M.D.              Assessment & Plan     Patient Active Problem List   Diagnosis   • Benign essential hypertension   • Borderline hyperglycemia   • Gastroesophageal reflux disease   • Hyperlipidemia   • Migraine   • Heart murmur   • Right fascicular block   • Acute thoracic back pain   • Dyspnea on exertion   • Recurrent vertigo   • Generalized osteoarthritis of multiple sites   • Diastolic dysfunction   • Chronic fatigue   • Mild concentric left ventricular hypertrophy (LVH)   • Hypercalcemia   • Hematoma   • Generalized anxiety disorder   • Chronic migraine without aura without status migrainosus, not intractable   • RBBB   • Colitis   • Abdominal pain   • UTI (urinary tract infection)   • Nausea and vomiting   • Black stool   • Abnormal CT of the abdomen   • Bacteremia   • Chronic atrial fibrillation with rapid ventricular response (HCC)   • Hyponatremia       Assessment:  1. Colitis: CT suggest sigmoid colon with focal colitis  2. Lower abdominal pain  3. Nausea: EGD unremarkable      Plan:  · Await decision for further prep today with examination versus standard prep today with examination tomorrow.  I discussed the patients findings and my recommendations with nursing staff.    Bertram Chavez MD

## 2022-07-14 NOTE — ANESTHESIA PREPROCEDURE EVALUATION
Anesthesia Evaluation     Patient summary reviewed and Nursing notes reviewed   NPO Solid Status: > 8 hours  NPO Liquid Status: > 2 hours           Airway   Mallampati: II  TM distance: >3 FB  Neck ROM: full  No difficulty expected  Dental - normal exam     Pulmonary - normal exam   (+) a smoker Former, shortness of breath,     PE comment: nonlabored  Cardiovascular - normal exam  Exercise tolerance: poor (<4 METS)    ECG reviewed  Rhythm: regular  Rate: normal    (+) hypertension less than 2 medications, valvular problems/murmurs murmur, dysrhythmias (RBBB; fascicular block) Paroxysmal Atrial Fib, CHF Diastolic >=55%, SCHULER, hyperlipidemia,     ROS comment: Hx new-onset afib with RVR (rate 137) yesterday, colonoscopy cancelled,  now in NSR    Neuro/Psych  (+) headaches, dizziness/light headedness, psychiatric history Anxiety,      ROS Comment: Vertigo/migraines  GI/Hepatic/Renal/Endo    (+)  GERD,      ROS Comment: Colitis    Musculoskeletal     (+) arthralgias, back pain,   Abdominal  - normal exam   Substance History      OB/GYN          Other   arthritis,                      Anesthesia Plan    ASA 3     MAC     intravenous induction     Anesthetic plan, risks, benefits, and alternatives have been provided, discussed and informed consent has been obtained with: patient.        CODE STATUS:

## 2022-07-14 NOTE — PLAN OF CARE
Goal Outcome Evaluation:  Plan of Care Reviewed With: patient        Progress: no change  Outcome Evaluation: VSS. Pt remains SR on cardiac monitoring - HR controlled in 70s-80s. Mag replacement completed this shift - recheck for this AM. K recheck this AM as well. UA and ECHO still needed today. NS continued @ 100mL/hr. +Up with assistance to bathroom. Purewick placed midway through shift with good output. Multiple BMs noted this shift. Pt is hopeful to have scope completed today. Clear liquid diet continued. PO flagyl given per orders. No other changes this shift. WCM

## 2022-07-14 NOTE — PROGRESS NOTES
Name: Sirisha Auguste ADMIT: 7/10/2022   : 1935  PCP: Majo Reynolds NP    MRN: [id number] LOS: 1 days   AGE/SEX: 86 y.o. female  ROOM: [facility]     Subjective   Subjective   S/p colonoscopy today.  Reports no chest pain/no palpitations/no shortness of breath/no wheeze/no PND/no dizziness.  Reports no abdominal pain.  Normal bowel movement.  No fresh bright blood per rectum.  No melena.  Nausea or vomiting.  No fever or chills.  No dysuria or hematuria.     Objective   Objective   Vital Signs  Temp:  [97.5 °F (36.4 °C)-98.5 °F (36.9 °C)] 98.5 °F (36.9 °C)  Heart Rate:  [60-85] 84  Resp:  [16] 16  BP: ()/(45-99) 158/75  SpO2:  [94 %-97 %] 96 %  on  Flow (L/min):  [4] 4;   Device (Oxygen Therapy): room air    Intake/Output Summary (Last 24 hours) at 2022 1549  Last data filed at 2022 1444  Gross per 24 hour   Intake 650 ml   Output 800 ml   Net -150 ml     Body mass index is 24.33 kg/m².      07/10/22  1524 22  1012 22  0939   Weight: 60.7 kg (133 lb 13.1 oz) 60.3 kg (133 lb) 60.3 kg (133 lb)     Physical Exam    General.  Elderly female.  Alert and oriented x3.  No apparent pain/distress/diaphoresis.  Normal mood and affect.  Eyes.  Pupils equal round and reactive.  Intact extraocular musculature.  No pallor or jaundice.  Normal projectory lids.  Oral cavity.  Moist mucous membrane.  Neck.  Supple.  No JVD.  No lymphadenopathy or thyromegaly.  Cardiovascular regular rate and rhythm. Grade 3 systolic murmur.  Abdomen.    Resolved abdominal tenderness.  No distention.  No guarding or rebound.  No organomegaly.  Positive bowel sounds.  Extremities.  No clubbing cyanosis or edema.  CNS.  No acute focal neurological deficits.      Results Review:      Results from last 7 days   Lab Units 22  0442 22  0710 22  0554 22  0535 07/10/22  1028   SODIUM mmol/L 132* 129* 135* 136 136   POTASSIUM mmol/L 4.1 3.5 3.4* 3.8 4.0   CHLORIDE mmol/L 99 96* 101 103 98    CO2 mmol/L 22.0 20.4* 24.0 24.0 26.0   BUN mg/dL 5* 7* 8 10 13   CREATININE mg/dL 0.73 0.69 0.75 0.66 0.87   GLUCOSE mg/dL 96 104* 107* 102* 115*   CALCIUM mg/dL 9.6 9.3 9.8 9.6 10.7*   AST (SGOT) U/L  --   --   --  15 19   ALT (SGPT) U/L  --   --   --  6 9     Estimated Creatinine Clearance: 47.3 mL/min (by C-G formula based on SCr of 0.73 mg/dL).          Results from last 7 days   Lab Units 07/13/22  1605   TROPONIN T ng/mL 0.018         Results from last 7 days   Lab Units 07/13/22  1605   TSH uIU/mL 1.310     Results from last 7 days   Lab Units 07/14/22  0442 07/13/22  1605   MAGNESIUM mg/dL 3.1* 1.3*           Invalid input(s): LDLCALC  Results from last 7 days   Lab Units 07/14/22  0442 07/13/22  0710 07/12/22  0554 07/11/22  0535 07/10/22  1028   WBC 10*3/mm3 4.63 9.72 6.10 9.58 10.48   HEMOGLOBIN g/dL 12.1 12.5 12.1 11.8* 14.2   HEMATOCRIT % 35.6 36.1 35.1 35.1 41.6   PLATELETS 10*3/mm3 194 196 185 185 234   MCV fL 88.8 88.9 88.9 90.9 90.0   MCH pg 30.2 30.8 30.6 30.6 30.7   MCHC g/dL 34.0 34.6 34.5 33.6 34.1   RDW % 12.4 12.0* 12.1* 12.3 12.1*   RDW-SD fl 40.1 39.1 38.9 40.3 39.1   MPV fL 11.4 11.2 11.6 11.4 11.1   NEUTROPHIL % % 71.1 75.5 70.9 80.0* 82.7*   LYMPHOCYTE % % 16.6* 14.6* 16.6* 10.9* 11.7*   MONOCYTES % % 9.1 7.9 7.7 7.7 4.2*   EOSINOPHIL % % 2.6 1.3 4.1 0.9 0.9   BASOPHIL % % 0.4 0.2 0.5 0.2 0.2   IMM GRAN % % 0.2 0.5 0.2 0.3 0.3   NEUTROS ABS 10*3/mm3 3.29 7.33* 4.33 7.66* 8.67*   LYMPHS ABS 10*3/mm3 0.77 1.42 1.01 1.04 1.23   MONOS ABS 10*3/mm3 0.42 0.77 0.47 0.74 0.44   EOS ABS 10*3/mm3 0.12 0.13 0.25 0.09 0.09   BASOS ABS 10*3/mm3 0.02 0.02 0.03 0.02 0.02   IMMATURE GRANS (ABS) 10*3/mm3 0.01 0.05 0.01 0.03 0.03   NRBC /100 WBC 0.0 0.0 0.0 0.0 0.0     Results from last 7 days   Lab Units 07/14/22  1158   INR  1.18*         Results from last 7 days   Lab Units 07/10/22  1413   LACTATE mmol/L 1.8         Results from last 7 days   Lab Units 07/10/22  1028   LIPASE U/L 33     Results from  last 7 days   Lab Units 07/10/22  1418 07/10/22  1413   BLOODCX  No growth at 4 days Bacillus species*   BCIDPCR   --  Negative by BCID PCR. Culture to Follow.     Results from last 7 days   Lab Units 07/13/22  1318   ADENOVIRUS  Not Detected     Results from last 7 days   Lab Units 07/10/22  1029   NITRITE UA  Negative   WBC UA /HPF 3-5*   BACTERIA UA /HPF 1+*   SQUAM EPITHEL UA /HPF 0-2     Results from last 7 days   Lab Units 07/13/22  1605   URIC ACID mg/dL 2.9       Imaging:  Imaging Results (Last 24 Hours)     ** No results found for the last 24 hours. **             I reviewed the patient's new clinical results / labs / tests / procedures      Assessment/Plan     Active Hospital Problems    Diagnosis  POA   • E. coli colitis [A04.4]  Yes   • Bacteremia [R78.81]  Yes   • Chronic atrial fibrillation with rapid ventricular response (HCC) [I48.20]  No   • Hyponatremia [E87.1]  No   • Abdominal pain [R10.9]  Yes   • UTI (urinary tract infection) [N39.0]  Yes   • Nausea and vomiting [R11.2]  Yes   • Black stool [K92.1]  Yes   • Abnormal CT of the abdomen [R93.5]  Yes   • Generalized anxiety disorder [F41.1]  Yes   • Diastolic dysfunction [I51.89]  Yes   • Benign essential hypertension [I10]  Yes   • Gastroesophageal reflux disease [K21.9]  Yes      Resolved Hospital Problems   No resolved problems to display.         · Sigmoid E. coli colitis in a patient with a history of GERD.    1 of 2 blood cultures revealed bacillus species. No fever or leukocytosis.  Stool PCR with E. coli.  .  EGD revealed a small hiatal hernia.    Colonoscopy colitis in descending colon and it was subsequently aborted after biopsy.  Was on IV Zosyn.  Switched per ID to 1 week therapy of p.o. Levaquin and Flagyl.  Will advance diet.  DC IV fluid.  Normal volume status.    · Bacillus bacteremia.  Source mostly GI.  Was on Zosyn and now on p.o. Levaquin and Flagyl x1 week.  Repeat blood cultures negative till now.  2D echo no evidence of  vegetation by 2D echo..  · Hyponatremia.  Mostly secondary to Maxide.  Improved with sto  · Chronic diastolic congestive heart failure/hypertension/cardiac murmur/right bundle branch block/new onset rapid A. fib..   Patient is asymptomatic.  S/p IV Lopressor in the endoscopy suite with improvement of the rate.  Now appears to be rate controlled.  Blood pressure is under good control.  Please..  TSH is normal.  Potassium is normal.  2D echo with a normal ejection fraction septal hypertrophy with protrusion to LVOT.  Hemodynamically stable.  No angina or congestive heart failure.  Cardiology consult note.  No anticoagulation currently until colitis care   · hyperlipidemia.  Continue Lipitor.  · Glucose intolerance.  Will monitor.  · VTE prophylaxis with Lovenox.     Discussed my findings and plan of treatment with the patient/daughter  Disposition.  Hopefully home tomorrow if labs are stable with no recurrence of the rapid A. fib and tolerating and okay with consultants.        Rocio Farmer MD  [facility]  07/14/22  15:49 EDT

## 2022-07-14 NOTE — PROGRESS NOTES
"    Patient Name: Sirisha Auguste  :1935  86 y.o.      Patient Care Team:  Majo Reynolds, NP as PCP - General (Nurse Practitioner)    Chief Complaint: follow up new atrial fibrillation, colitis    Interval History: I saw her in cardiology while she as getting her echo done. He feels weak and tired from not eating. Otherwise no complaints.       Objective   Vital Signs  Temp:  [97.5 °F (36.4 °C)-98.5 °F (36.9 °C)] 98.5 °F (36.9 °C)  Heart Rate:  [] 74  Resp:  [16] 16  BP: (115-182)/() 182/99    Intake/Output Summary (Last 24 hours) at 2022 1009  Last data filed at 2022 0741  Gross per 24 hour   Intake 100 ml   Output 800 ml   Net -700 ml     Flowsheet Rows    Flowsheet Row First Filed Value   Admission Height 157.5 cm (62\") Documented at 2022 1012   Admission Weight 60.7 kg (133 lb 13.1 oz) Documented at 07/10/2022 1524          Physical Exam:   General Appearance:    Alert, cooperative, in no acute distress   Lungs:     Clear to auscultation.  Normal respiratory effort and rate.      Heart:    Regular rhythm and normal rate, normal S1 and S2, no murmurs, gallops or rubs.     Chest Wall:    No abnormalities observed   Abdomen:     Soft, nontender, positive bowel sounds.     Extremities:   no cyanosis, clubbing or edema.  No marked joint deformities.  Adequate musculoskeletal strength.       Results Review:    Results from last 7 days   Lab Units 22  0442   SODIUM mmol/L 132*   POTASSIUM mmol/L 4.1   CHLORIDE mmol/L 99   CO2 mmol/L 22.0   BUN mg/dL 5*   CREATININE mg/dL 0.73   GLUCOSE mg/dL 96   CALCIUM mg/dL 9.6     Results from last 7 days   Lab Units 22  1605   TROPONIN T ng/mL 0.018     Results from last 7 days   Lab Units 22  0442   WBC 10*3/mm3 4.63   HEMOGLOBIN g/dL 12.1   HEMATOCRIT % 35.6   PLATELETS 10*3/mm3 194         Results from last 7 days   Lab Units 22  0442   MAGNESIUM mg/dL 3.1*                   Medication Review:   atorvastatin, " 10 mg, Oral, Nightly  bisacodyl, 20 mg, Oral, Once  enoxaparin, 40 mg, Subcutaneous, Q24H  lactobacillus acidophilus, 1 capsule, Oral, Daily  levoFLOXacin, 750 mg, Oral, Q24H  metoprolol succinate XL, 75 mg, Oral, Daily  metroNIDAZOLE, 500 mg, Oral, Q8H  montelukast, 10 mg, Oral, Nightly  pantoprazole, 40 mg, Oral, QAM  senna-docusate sodium, 2 tablet, Oral, BID  sodium chloride, 10 mL, Intravenous, Q12H         lactated ringers, 100 mL/hr, Last Rate: 100 mL/hr (07/13/22 7141)        Assessment & Plan   1. Atrial fibrillation, paroxysmal. New. In setting of colitis and colonoscopt prep. Now back in SR. TSH within normal limits. Echocardiogram pending. Continue beta blocker. Avoiding AC at this time. Will follow.     2. Diastolic dysfunction. Does not appears to be in heart failure currently.     3. Colitis - no objection to colonoscopy from a cardiac standpoint.     DUY Parson  [facility]  07/14/22  10:09 EDT

## 2022-07-14 NOTE — TELEPHONE ENCOUNTER
Call to daughter, Marcos (see hipaa).  Pt currently inpt at [facility].  States hopes for pt to get scoped today.      Advise per prior note of today that DR Chavez has advised that pt will be scoped today.  For any further questions re: scheduling, discuss with hosp staff.  States will do so.

## 2022-07-14 NOTE — NURSING NOTE
Spoke with RN in endo regarding order for COVID swab as they were wanting to send for patient. She stated that repeat COVID swab not needed for colonoscopy, she would double check with her manager and call back if it is needed. No call received from endo to obtain swab.

## 2022-07-14 NOTE — TELEPHONE ENCOUNTER
----- Message from Chloe Ramos sent at 7/14/2022  8:00 AM EDT -----  Regarding: CALL BACK  Contact: [phone]  PT is being scoped today and daughter wants to speak with Dr. Gates. Please call [phone]

## 2022-07-14 NOTE — TELEPHONE ENCOUNTER
Dr. Chavez called this morning and stated that pt is being scoped today. Apparently the daughter has questions. The best number to reach her at is [phone].

## 2022-07-14 NOTE — PLAN OF CARE
Goal Outcome Evaluation:  Plan of Care Reviewed With: patient        Progress: improving  Outcome Evaluation: No c/o pain or soa. Colonoscopy done this afternoon and 2 enemas given, showed significant colitis, continues on PO antibiotics. Echo done this morning, EF >70%. Will start regular low NA diet with dinner. Family at bedside.

## 2022-07-15 ENCOUNTER — READMISSION MANAGEMENT (OUTPATIENT)
Dept: CALL CENTER | Facility: HOSPITAL | Age: 87
End: 2022-07-15

## 2022-07-15 ENCOUNTER — HOME HEALTH ADMISSION (OUTPATIENT)
Dept: HOME HEALTH SERVICES | Facility: HOME HEALTHCARE | Age: 87
End: 2022-07-15

## 2022-07-15 VITALS
RESPIRATION RATE: 16 BRPM | OXYGEN SATURATION: 96 % | HEART RATE: 63 BPM | HEIGHT: 62 IN | SYSTOLIC BLOOD PRESSURE: 143 MMHG | WEIGHT: 133 LBS | BODY MASS INDEX: 24.48 KG/M2 | TEMPERATURE: 97.3 F | DIASTOLIC BLOOD PRESSURE: 77 MMHG

## 2022-07-15 PROBLEM — R11.2 NAUSEA AND VOMITING: Status: RESOLVED | Noted: 2022-07-10 | Resolved: 2022-07-15

## 2022-07-15 PROBLEM — K92.1 BLACK STOOL: Status: RESOLVED | Noted: 2022-07-10 | Resolved: 2022-07-15

## 2022-07-15 PROBLEM — R10.9 ABDOMINAL PAIN: Status: RESOLVED | Noted: 2022-07-10 | Resolved: 2022-07-15

## 2022-07-15 PROBLEM — N39.0 UTI (URINARY TRACT INFECTION): Status: RESOLVED | Noted: 2022-07-10 | Resolved: 2022-07-15

## 2022-07-15 PROBLEM — E87.1 HYPONATREMIA: Status: RESOLVED | Noted: 2022-07-13 | Resolved: 2022-07-15

## 2022-07-15 LAB
ANION GAP SERPL CALCULATED.3IONS-SCNC: 11 MMOL/L (ref 5–15)
BACTERIA SPEC AEROBE CULT: NORMAL
BASOPHILS # BLD AUTO: 0.02 10*3/MM3 (ref 0–0.2)
BASOPHILS NFR BLD AUTO: 0.5 % (ref 0–1.5)
BUN SERPL-MCNC: 8 MG/DL (ref 8–23)
BUN/CREAT SERPL: 11.3 (ref 7–25)
CALCIUM SPEC-SCNC: 9.6 MG/DL (ref 8.6–10.5)
CHLORIDE SERPL-SCNC: 101 MMOL/L (ref 98–107)
CO2 SERPL-SCNC: 23 MMOL/L (ref 22–29)
CREAT SERPL-MCNC: 0.71 MG/DL (ref 0.57–1)
DEPRECATED RDW RBC AUTO: 40.3 FL (ref 37–54)
EGFRCR SERPLBLD CKD-EPI 2021: 82.9 ML/MIN/1.73
EOSINOPHIL # BLD AUTO: 0.19 10*3/MM3 (ref 0–0.4)
EOSINOPHIL NFR BLD AUTO: 4.3 % (ref 0.3–6.2)
ERYTHROCYTE [DISTWIDTH] IN BLOOD BY AUTOMATED COUNT: 12.2 % (ref 12.3–15.4)
GLUCOSE SERPL-MCNC: 101 MG/DL (ref 65–99)
HCT VFR BLD AUTO: 36.7 % (ref 34–46.6)
HGB BLD-MCNC: 12.2 G/DL (ref 12–15.9)
IMM GRANULOCYTES # BLD AUTO: 0.01 10*3/MM3 (ref 0–0.05)
IMM GRANULOCYTES NFR BLD AUTO: 0.2 % (ref 0–0.5)
LAB AP CASE REPORT: NORMAL
LYMPHOCYTES # BLD AUTO: 0.84 10*3/MM3 (ref 0.7–3.1)
LYMPHOCYTES NFR BLD AUTO: 19.2 % (ref 19.6–45.3)
MCH RBC QN AUTO: 30 PG (ref 26.6–33)
MCHC RBC AUTO-ENTMCNC: 33.2 G/DL (ref 31.5–35.7)
MCV RBC AUTO: 90.4 FL (ref 79–97)
MONOCYTES # BLD AUTO: 0.45 10*3/MM3 (ref 0.1–0.9)
MONOCYTES NFR BLD AUTO: 10.3 % (ref 5–12)
NEUTROPHILS NFR BLD AUTO: 2.86 10*3/MM3 (ref 1.7–7)
NEUTROPHILS NFR BLD AUTO: 65.5 % (ref 42.7–76)
NRBC BLD AUTO-RTO: 0 /100 WBC (ref 0–0.2)
PATH REPORT.FINAL DX SPEC: NORMAL
PATH REPORT.GROSS SPEC: NORMAL
PLATELET # BLD AUTO: 204 10*3/MM3 (ref 140–450)
PMV BLD AUTO: 11.3 FL (ref 6–12)
POTASSIUM SERPL-SCNC: 3.6 MMOL/L (ref 3.5–5.2)
RBC # BLD AUTO: 4.06 10*6/MM3 (ref 3.77–5.28)
SODIUM SERPL-SCNC: 135 MMOL/L (ref 136–145)
WBC NRBC COR # BLD: 4.37 10*3/MM3 (ref 3.4–10.8)

## 2022-07-15 PROCEDURE — 80048 BASIC METABOLIC PNL TOTAL CA: CPT | Performed by: INTERNAL MEDICINE

## 2022-07-15 PROCEDURE — 99232 SBSQ HOSP IP/OBS MODERATE 35: CPT | Performed by: INTERNAL MEDICINE

## 2022-07-15 PROCEDURE — 85025 COMPLETE CBC W/AUTO DIFF WBC: CPT | Performed by: INTERNAL MEDICINE

## 2022-07-15 RX ORDER — METOPROLOL SUCCINATE 25 MG/1
75 TABLET, EXTENDED RELEASE ORAL DAILY
Qty: 30 TABLET | Refills: 3 | Status: SHIPPED | OUTPATIENT
Start: 2022-07-16 | End: 2022-10-21 | Stop reason: CLARIF

## 2022-07-15 RX ORDER — LEVOFLOXACIN 750 MG/1
750 TABLET ORAL EVERY 24 HOURS
Qty: 5 TABLET | Refills: 0 | Status: SHIPPED | OUTPATIENT
Start: 2022-07-15 | End: 2022-07-20

## 2022-07-15 RX ORDER — METRONIDAZOLE 500 MG/1
500 TABLET ORAL EVERY 8 HOURS SCHEDULED
Qty: 15 TABLET | Refills: 0 | Status: SHIPPED | OUTPATIENT
Start: 2022-07-15 | End: 2022-07-20

## 2022-07-15 RX ORDER — CALCIUM CARBONATE 200(500)MG
2 TABLET,CHEWABLE ORAL ONCE
Status: COMPLETED | OUTPATIENT
Start: 2022-07-15 | End: 2022-07-15

## 2022-07-15 RX ADMIN — Medication 10 ML: at 09:39

## 2022-07-15 RX ADMIN — ACETAMINOPHEN 650 MG: 325 TABLET, FILM COATED ORAL at 04:10

## 2022-07-15 RX ADMIN — METOPROLOL SUCCINATE 75 MG: 25 TABLET, EXTENDED RELEASE ORAL at 09:39

## 2022-07-15 RX ADMIN — PANTOPRAZOLE SODIUM 40 MG: 40 TABLET, DELAYED RELEASE ORAL at 06:08

## 2022-07-15 RX ADMIN — ANTACID TABLETS 2 TABLET: 500 TABLET, CHEWABLE ORAL at 04:41

## 2022-07-15 RX ADMIN — METRONIDAZOLE 500 MG: 500 TABLET, FILM COATED ORAL at 06:08

## 2022-07-15 RX ADMIN — ALPRAZOLAM 0.5 MG: 0.5 TABLET ORAL at 00:23

## 2022-07-15 RX ADMIN — Medication 1 CAPSULE: at 09:39

## 2022-07-15 NOTE — DISCHARGE SUMMARY
Patient Name: Sirisha Auguste  : 1935  MRN: [id number]    Date of Admission: 7/10/2022  Date of Discharge:  7/15/2022  Primary Care Physician: Majo Reynolds NP      Discharge Diagnoses     Active Hospital Problems    Diagnosis  POA   • E. coli colitis [A04.4]  Yes   • Bacteremia [R78.81]  Yes   • Chronic atrial fibrillation with rapid ventricular response (HCC) [I48.20]  No   • Abnormal CT of the abdomen [R93.5]  Yes   • Generalized anxiety disorder [F41.1]  Yes   • Diastolic dysfunction [I51.89]  Yes   • Benign essential hypertension [I10]  Yes   • Gastroesophageal reflux disease [K21.9]  Yes      Resolved Hospital Problems    Diagnosis Date Resolved POA   • Hyponatremia [E87.1] 07/15/2022 No   • Abdominal pain [R10.9] 07/15/2022 Yes   • UTI (urinary tract infection) [N39.0] 07/15/2022 Yes   • Nausea and vomiting [R11.2] 07/15/2022 Yes   • Black stool [K92.1] 07/15/2022 Yes        Hospital Course     Brief admission history and physical.  Please refer to the H&P for full details.  A very pleasant 86 years old white female with a past history of generalized osteoarthritis/glucose intolerance/hypertension cardiac murmur/dyslipidemia/migraine who presents to the emergency department with abdominal pain/diarrhea-minutes is sticky stool/nausea and vomiting.  Physical examination admission included a blood pressure of 163/77 a pulse of 72 temperature 97.6 respiratory rate of 18 O2 sats 99% on room air the rest of the examination is remarkable for elderly frail female/grade 3 systolic murmur/generalized abdominal tenderness  Hospital course.  Initial ER evaluation included a CBC that was normal.  Lipase was normal.  CMP was normal except for calcium of 10.7 and random blood sugar of 115.  UA not suggestive of UTI.  Lactic acid was normal.  COVID was negative.  A CAT scan of the abdomen with contrast revealed progressive findings suggestive of focal colitis in the proximal sigmoid colon with  persistent mildly progressive retention of the stool in the colon.  Patient was admitted with progressive sigmoid colitis with failure of outpatient treatment and a history of GERD.  Blood cultures were obtained.  There was no initial fever or leukocytosis.  She was placed on IV Zosyn.  One of the 2 blood cultures came back positive for bacillus species.  Stool PCR was positive for E. coli and the final diagnosis was that of sigmoid E. coli colitis in a patient with a history of GERD.  Infectious disease and GI consult was obtained.  Infectious disease switched the patient from IV Zosyn to p.o. Levaquin and Flagyl for 1 week.  She was placed on clear liquid diets.  GI performed an EGD that revealed a small hiatal hernia.  A colonoscopy was done and revealed colitis in the descending colon and biopsy was taken and the rest of the colonoscopy was aborted for risk of perforation.  Repeat blood cultures were negative.  During her hospital stay she developed hyponatremia which was thought to be secondary to Maxide that was stopped and this has resolved.  She does have a history of diastolic congestive heart failure/hypernatremia/cardiac murmur/right bundle branch block.  The day of the first attempt of the colonoscopy she developed a rapid A. fib that had cardioverted spontaneously with IV Lopressor.  Her Toprol was increased.  Blood pressure remained under good control.  TSH/potassium were normal.  Magnesium was low and this was substituted.  Troponin was negative.  2D echo revealed a normal ejection fraction septal hypertrophy.  Patient remained asymptomatic during his paroxysmal A. fib that has resolved with no evidence of angina or congestive heart failure.  Anticoagulation held at this time as the patient has active colitis and is a high risk of bleed however this should be considered as an outpatient in couple of weeks with cardiology as the patient Eber vascular score is elevated.  She was noted to have  hyperglycemia and she was diagnosed with glucose intolerance.  For her dyslipidemia we will continue with Lipitor.  After scopes the abdominal pain has improved greatly and her diet was advanced and tolerated that fairly well.  She had loose stool at discharge and her laxatives were DC'd.  There was no active GI bleed at the time of discharge.  Patient was hemodynamically stable at the time of discharge.  Patient will be discharged with home health.  She is to follow-up with primary MD/GI (.  He CT and colonoscopy)/cardiology (start anticoagulation).    Consultants     Consult Orders (all) (From admission, onward)     Start     Ordered    07/13/22 1329  Inpatient Infectious Diseases Consult  Once        Specialty:  Infectious Diseases  Provider:  Raul Daniels MD    07/13/22 1328    07/13/22 1036  Inpatient Cardiology Consult  STAT        Comments: Spoke with Dorie awaiting orders   Specialty:  Cardiology  Provider:  Kristopher Hameed Jr., MD    07/13/22 1035    07/10/22 1632  Inpatient Gastroenterology Consult  Once        Specialty:  Gastroenterology  Provider:  Mj Hung MD    07/10/22 1631    07/10/22 1527  Inpatient Nutrition Consult  Once        Provider:  (Not yet assigned)    07/10/22 1526    07/10/22 1526  Inpatient Case Management  Consult  Once        Provider:  (Not yet assigned)    07/10/22 1526    07/10/22 1345  Gastroenterology (on-call MD unless specified)  Once        Specialty:  Gastroenterology  Provider:  (Not yet assigned)    07/10/22 1344    07/10/22 1343  [facility] (on-call MD unless specified) Details  Once        Specialty:  Hospitalist  Provider:  (Not yet assigned)    07/10/22 1342              Procedures     Imaging Results (All)     Procedure Component Value Units Date/Time    CT Abdomen Pelvis With Contrast [[id number]] Collected: 07/10/22 1428     Updated: 07/10/22 1452    Narrative:      CT OF THE ABDOMEN AND PELVIS WITH CONTRAST     HISTORY:  86-year-old female with a history of generalized abdominal pain  with cramping.     TECHNIQUE: Contiguous axial images were obtained through the abdomen and  pelvis following intravenous administration of nonionic contrast. Oral  contrast  was not administered. Radiation dose reduction techniques were  utilized, including automated exposure control and exposure modulation  based on body size.     COMPARISON: CT of the abdomen and pelvis without contrast, 07/04/2022.      FINDINGS: The visualized portion of the lung bases are clear of  consolidation. Dependent atelectasis is seen at the base of the right  lower lobe. Stable severe atheromatous calcification of the mitral valve  is noted. The liver has a normal appearance. The gallbladder appears  normal. The pancreas has a normal appearance. There is mild bilateral  renal cortical atrophy. A 0.8 cm right renal cyst is noted. The adrenal  glands appear normal. The spleen has a normal appearance. There is  slightly greater thickening of the wall of the sigmoid colon in the left  lower quadrant which measures on the order of 0.6 cm in thickness. There  is mild surrounding stranding. No central diverticulum is appreciated.  Persistent and increased moderate retention of stool throughout the  colon is noted. No other abnormality of the bowel is appreciated.  There  is stable severe loss of intervertebral disc height at L1-2, L2-3, L3-4  and L4-5 with abutting endplate osteophytic change. Stable moderately  severe atheromatous calcification is seen within the abdominal aorta.       Impression:      1. There are persistent and slightly progressive findings suggestive of  focal colitis of the proximal sigmoid colon.  2. There is persistent and mildly progressive moderate retention of  stool throughout the colon.     This report was finalized on 7/10/2022 2:49 PM by Dr. Troy Cisneros M.D.             Pertinent Labs     Results from last 7 days   Lab Units 07/15/22  0542  07/14/22  0442 07/13/22  0710 07/12/22  0554   WBC 10*3/mm3 4.37 4.63 9.72 6.10   HEMOGLOBIN g/dL 12.2 12.1 12.5 12.1   PLATELETS 10*3/mm3 204 194 196 185     Results from last 7 days   Lab Units 07/15/22  0542 07/14/22  0442 07/13/22  0710 07/12/22  0554   SODIUM mmol/L 135* 132* 129* 135*   POTASSIUM mmol/L 3.6 4.1 3.5 3.4*   CHLORIDE mmol/L 101 99 96* 101   CO2 mmol/L 23.0 22.0 20.4* 24.0   BUN mg/dL 8 5* 7* 8   CREATININE mg/dL 0.71 0.73 0.69 0.75   GLUCOSE mg/dL 101* 96 104* 107*   Estimated Creatinine Clearance: 48.7 mL/min (by C-G formula based on SCr of 0.71 mg/dL).  Results from last 7 days   Lab Units 07/11/22  0535 07/10/22  1028   ALBUMIN g/dL 3.60 4.50   BILIRUBIN mg/dL 0.8 0.8   ALK PHOS U/L 64 87   AST (SGOT) U/L 15 19   ALT (SGPT) U/L 6 9     Results from last 7 days   Lab Units 07/15/22  0542 07/14/22  0442 07/13/22  1605 07/13/22  0710 07/12/22  0554 07/11/22  0535 07/10/22  1028   CALCIUM mg/dL 9.6 9.6  --  9.3 9.8 9.6 10.7*   ALBUMIN g/dL  --   --   --   --   --  3.60 4.50   MAGNESIUM mg/dL  --  3.1* 1.3*  --   --   --   --      Results from last 7 days   Lab Units 07/10/22  1028   LIPASE U/L 33     Results from last 7 days   Lab Units 07/13/22  1605   TROPONIN T ng/mL 0.018     Results from last 7 days   Lab Units 07/13/22  1605   URIC ACID mg/dL 2.9         Invalid input(s): LDLCALC  Results from last 7 days   Lab Units 07/13/22  1603 07/13/22  1557 07/10/22  1418 07/10/22  1413   BLOODCX  No growth at 24 hours No growth at 24 hours No growth at 4 days Bacillus species*   BCIDPCR   --   --   --  Negative by BCID PCR. Culture to Follow.     Imaging Results (Last 24 Hours)     ** No results found for the last 24 hours. **          Test Results Pending at Discharge     Pending Labs     Order Current Status    Tissue Pathology Exam In process    Blood Culture - Blood, Arm, Left Preliminary result    Blood Culture - Blood, Arm, Right Preliminary result    Blood Culture - Blood, Arm, Right  Preliminary result    Blood Culture - Blood, Arm, Left Edited            Discharge Exam   Physical Exam  Vitals temperature 97.3 a pulse of 63 respiratory rate of 16 blood pressure 143/77 O2 sats of 96% on room air  General.  Elderly female.  Alert and oriented x3.  No apparent pain/distress/diaphoresis.  Normal mood and affect.  Eyes.  Pupils equal round and reactive.  Intact extraocular musculature.  No pallor or jaundice.  Normal projectory lids.  Oral cavity.  Moist mucous membrane.  Neck.  Supple.  No JVD.  No lymphadenopathy or thyromegaly.  Cardiovascular regular rate and rhythm. Grade 3 systolic murmur.  Abdomen.    Resolved abdominal tenderness.  No distention.  No guarding or rebound.  No organomegaly.  Positive bowel sounds.  Extremities.  No clubbing cyanosis or edema.  CNS.  No acute focal neurological deficits.  Discharge Details        Discharge Medications      New Medications      Instructions Start Date   levoFLOXacin 750 MG tablet  Commonly known as: LEVAQUIN   750 mg, Oral, Every 24 Hours      metroNIDAZOLE 500 MG tablet  Commonly known as: FLAGYL   500 mg, Oral, Every 8 Hours Scheduled         Changes to Medications      Instructions Start Date   metoprolol succinate XL 25 MG 24 hr tablet  Commonly known as: TOPROL-XL  What changed:   · medication strength  · how much to take   75 mg, Oral, Daily   Start Date: July 16, 2022        Continue These Medications      Instructions Start Date   Advanced Calcium/D/Magnesium tablet   Oral      ALPRAZolam 0.5 MG tablet  Commonly known as: XANAX   TAKE 1 TABLET AT NIGHT AS NEEDED FOR ANXIETY      diclofenac 1 % gel gel  Commonly known as: VOLTAREN   4 g, Topical, 4 Times Daily PRN      esomeprazole 40 MG capsule  Commonly known as: nexIUM   TAKE 1 CAPSULE EVERY MORNING BEFORE BREAKFAST      estradiol 0.1 MG/GM vaginal cream  Commonly known as: ESTRACE   2 g, Vaginal, Daily      ezetimibe-simvastatin 10-20 MG per tablet  Commonly known as: VYTORIN   TAKE 1  TABLET EVERY NIGHT      meclizine 25 MG tablet  Commonly known as: ANTIVERT   25 mg, Oral, 3 Times Daily PRN      montelukast 10 MG tablet  Commonly known as: SINGULAIR   TAKE 1 TABLET EVERY NIGHT      PROBIOTIC PO   Oral, Every Other Day      SUMAtriptan 100 MG tablet  Commonly known as: IMITREX   TAKE 1 TABLET AT ONSET OF HEADACHE. MAY REPEAT DOSE ONE TIME IN 2 HOURS IF HEADACHE NOT RELIEVED         Stop These Medications    aspirin 81 MG EC tablet     cefdinir 300 MG capsule  Commonly known as: OMNICEF     promethazine-codeine 6.25-10 MG/5ML syrup  Commonly known as: PHENERGAN with CODEINE            Allergies   Allergen Reactions   • Sulfa Antibiotics Other (See Comments)     unknown         Discharge Disposition:  Condition: Stable    Diet:   Diet Order   Procedures   • Diet Regular; Low Sodium; No Added Salt       Activity:   Activity Instructions     Activity as Tolerated      Other Activity Instructions      Activity Instructions: Home PT to evaluate and treat    Up WIth Assist            Counseling : No nonsteroidal    CODE STATUS:    Code Status and Medical Interventions:   Ordered at: 07/10/22 1516     Code Status (Patient has no pulse and is not breathing):    CPR (Attempt to Resuscitate)     Medical Interventions (Patient has pulse or is breathing):    Full Support       No future appointments.  Additional Instructions for the Follow-ups that You Need to Schedule     Ambulatory Referral to Home Health   As directed      Face to Face Visit Date: 7/15/2022    Follow-up provider for Plan of Care?: I treated the patient in an acute care facility and will not continue treatment after discharge.    Follow-up provider: NO KNOWN PROVIDER [[id number]]    Reason/Clinical Findings: Weakness/colitis/A. fib    Describe mobility limitations that make leaving home difficult: As above    Nursing/Therapeutic Services Requested: Skilled Nursing Physical Therapy    Skilled nursing orders: Medication education    PT orders: Gait  Training Therapeutic exercise Strengthening Transfer training    Weight Bearing Status: As Tolerated    Frequency: 1 Week 1         Call MD With Problems / Concerns   As directed      Instructions: Call MD or return to ER if fever and chills/increasing abdominal pain/watery diarrhea/fresh bright blood per rectum/nausea or vomiting/chest pain/shortness of breath/palpitation    Order Comments: Instructions: Call MD or return to ER if fever and chills/increasing abdominal pain/watery diarrhea/fresh bright blood per rectum/nausea or vomiting/chest pain/shortness of breath/palpitation          Discharge Follow-up with PCP   As directed       Currently Documented PCP:    Majo Reynolds NP    PCP Phone Number:    [phone]     Follow Up Details: 1 week.  E. coli sigmoid colitis/GERD/Bacillus bacteremia/hyponatremia/cardiac murmur/intermittent A. fib/dyslipidemia/glucose intolerance         Discharge Follow-up with Specified Provider: Cardiology; 2 Weeks   As directed      To: Cardiology    Follow Up: 2 Weeks    Follow Up Details: Hypertension/cardiac murmur/A. fib consider starting anticoagulation as the colitis has been healing.         Discharge Follow-up with Specified Provider: GI.; 2 Weeks   As directed      To: GI.    Follow Up: 2 Weeks    Follow Up Details: E. coli sigmoid colitis/pathologic bacteremia/follow-up on colonoscopy and biopsy            Follow-up Information     Majo Reynolds NP .    Specialty: Nurse Practitioner  Why: 1 week.  E. coli sigmoid colitis/GERD/Bacillus bacteremia/hyponatremia/cardiac murmur/intermittent A. fib/dyslipidemia/glucose intolerance  Contact information:  [address] [place] [address]  [phone]                           Time Spent on Discharge:  Greater than 30 minutes      Rocio Farmer MD  [facility]  07/15/22  09:58 EDT

## 2022-07-15 NOTE — PROGRESS NOTES
[facility]  Inpatient Progress Note    Reason for Follow Up: Abdominal pain diarrhea    Subjective     Interval History:   Loose stools overnight, colonoscopy results reviewed with patient.  Path pending    Current Facility-Administered Medications:   •  acetaminophen (TYLENOL) tablet 650 mg, 650 mg, Oral, Q4H PRN, 650 mg at 07/15/22 0410 **OR** acetaminophen (TYLENOL) 160 MG/5ML solution 650 mg, 650 mg, Oral, Q4H PRN **OR** acetaminophen (TYLENOL) suppository 650 mg, 650 mg, Rectal, Q4H PRN, Henrik Gaston MD  •  ALPRAZolam (XANAX) tablet 0.5 mg, 0.5 mg, Oral, Nightly PRN, Henrik Gaston MD, 0.5 mg at 07/15/22 0023  •  atorvastatin (LIPITOR) tablet 10 mg, 10 mg, Oral, Nightly, Henrik Gaston MD, 10 mg at 07/14/22 2053  •  bisacodyl (DULCOLAX) EC tablet 20 mg, 20 mg, Oral, Once, Henrik Gaston MD  •  Enoxaparin Sodium (LOVENOX) syringe 40 mg, 40 mg, Subcutaneous, Q24H, Henrik Gaston MD, 40 mg at 07/14/22 1742  •  HYDROmorphone (DILAUDID) injection 0.5 mg, 0.5 mg, Intravenous, Q2H PRN, Henrik Gaston MD  •  lactobacillus acidophilus (RISAQUAD) capsule 1 capsule, 1 capsule, Oral, Daily, Henrik Gaston MD, 1 capsule at 07/12/22 0837  •  levoFLOXacin (LEVAQUIN) tablet 750 mg, 750 mg, Oral, Q24H, Henrik Gaston MD, 750 mg at 07/14/22 1742  •  Magnesium Sulfate 2 gram Bolus, followed by 8 gram infusion (total Mg dose 10 grams)- Mg less than or equal to 1mg/dL, 2 g, Intravenous, PRN **OR** Magnesium Sulfate 2 gram / 50mL Infusion (GIVE X 3 BAGS TO EQUAL 6GM TOTAL DOSE) - Mg 1.1 - 1.5 mg/dl, 2 g, Intravenous, PRN, Last Rate: 25 mL/hr at 07/13/22 2249, 2 g at 07/13/22 2249 **OR** Magnesium Sulfate 4 gram infusion- Mg 1.6-1.9 mg/dL, 4 g, Intravenous, PRN, Henrik Gaston MD  •  meclizine (ANTIVERT) tablet 25 mg, 25 mg, Oral, TID PRN, Henrik Gaston MD  •  metoprolol succinate XL (TOPROL-XL) 24 hr tablet 75 mg, 75 mg, Oral, Daily, Henrik Gaston MD, 75 mg at 07/14/22 1129  •  metroNIDAZOLE (FLAGYL) tablet 500 mg,  500 mg, Oral, Q8H, Henrik Gaston MD, 500 mg at 07/15/22 0608  •  montelukast (SINGULAIR) tablet 10 mg, 10 mg, Oral, Nightly, Henrik Gaston MD, 10 mg at 07/14/22 2053  •  nitroglycerin (NITROSTAT) SL tablet 0.4 mg, 0.4 mg, Sublingual, Q5 Min PRN, Henrik Gaston MD  •  ondansetron (ZOFRAN) injection 4 mg, 4 mg, Intravenous, Q6H PRN, Henrik Gaston MD, 4 mg at 07/10/22 2110  •  pantoprazole (PROTONIX) EC tablet 40 mg, 40 mg, Oral, QAM, Henrik Gaston MD, 40 mg at 07/15/22 0608  •  potassium chloride (K-DUR,KLOR-CON) ER tablet 40 mEq, 40 mEq, Oral, PRN, 40 mEq at 07/13/22 1241 **OR** potassium chloride (KLOR-CON) packet 40 mEq, 40 mEq, Oral, PRN **OR** potassium chloride 10 mEq in 100 mL IVPB, 10 mEq, Intravenous, Q1H PRN, Henrik Gaston MD  •  sennosides-docusate (PERICOLACE) 8.6-50 MG per tablet 2 tablet, 2 tablet, Oral, BID, Henrik Gaston MD, 2 tablet at 07/13/22 2023  •  sodium chloride 0.9 % flush 10 mL, 10 mL, Intravenous, PRN, Henrik Gaston MD  •  sodium chloride 0.9 % flush 10 mL, 10 mL, Intravenous, Q12H, Henrik Gaston MD, 10 mL at 07/14/22 2053  •  sodium chloride 0.9 % flush 10 mL, 10 mL, Intravenous, PRN, Henrik Gaston MD  •  sodium chloride 0.9 % infusion, 30 mL/hr, Intravenous, Continuous PRN, Henrik Gaston MD, Stopped at 07/14/22 1444  •  SUMAtriptan (IMITREX) tablet 100 mg, 100 mg, Oral, Q2H PRN, Henrik Gaston MD, 100 mg at 07/11/22 1833  Review of Systems:    Is weakness and fatigue all other systems reviewed and    Objective     Vital Signs  Temp:  [97.8 °F (36.6 °C)-98.5 °F (36.9 °C)] 97.8 °F (36.6 °C)  Heart Rate:  [60-85] 75  Resp:  [16] 16  BP: ()/(45-99) 150/86  Body mass index is 24.33 kg/m².    Intake/Output Summary (Last 24 hours) at 7/15/2022 0707  Last data filed at 7/14/2022 2230  Gross per 24 hour   Intake 600 ml   Output 800 ml   Net -200 ml     No intake/output data recorded.     Physical Exam:   General: patient awake, alert and cooperative   Eyes: Normal lids and lashes, no scleral  icterus   Neck: supple, normal ROM   Skin: warm and dry, not jaundiced   Cardiovascular: regular rhythm and rate, no murmurs auscultated   Pulm: clear to auscultation bilaterally, regular and unlabored   Abdomen: soft, nontender, nondistended; normal bowel sounds   Extremities: no rash or edema   Psychiatric: Normal mood and behavior; memory intact     Results Review:     I reviewed the patient's new clinical results.    Results from last 7 days   Lab Units 07/15/22  0542 07/14/22 0442 07/13/22  0710   WBC 10*3/mm3 4.37 4.63 9.72   HEMOGLOBIN g/dL 12.2 12.1 12.5   HEMATOCRIT % 36.7 35.6 36.1   PLATELETS 10*3/mm3 204 194 196     Results from last 7 days   Lab Units 07/15/22  0542 07/14/22  0442 07/13/22  0710 07/12/22  0554 07/11/22  0535 07/10/22  1028   SODIUM mmol/L 135* 132* 129*   < > 136 136   POTASSIUM mmol/L 3.6 4.1 3.5   < > 3.8 4.0   CHLORIDE mmol/L 101 99 96*   < > 103 98   CO2 mmol/L 23.0 22.0 20.4*   < > 24.0 26.0   BUN mg/dL 8 5* 7*   < > 10 13   CREATININE mg/dL 0.71 0.73 0.69   < > 0.66 0.87   CALCIUM mg/dL 9.6 9.6 9.3   < > 9.6 10.7*   BILIRUBIN mg/dL  --   --   --   --  0.8 0.8   ALK PHOS U/L  --   --   --   --  64 87   ALT (SGPT) U/L  --   --   --   --  6 9   AST (SGOT) U/L  --   --   --   --  15 19   GLUCOSE mg/dL 101* 96 104*   < > 102* 115*    < > = values in this interval not displayed.     Results from last 7 days   Lab Units 07/14/22  1158   INR  1.18*     Lab Results   Lab Value Date/Time    LIPASE 33 07/10/2022 1028    LIPASE 33 07/04/2022 1145    LIPASE 35 02/14/2017 0112       Radiology:  CT Abdomen Pelvis With Contrast   Final Result   1. There are persistent and slightly progressive findings suggestive of   focal colitis of the proximal sigmoid colon.   2. There is persistent and mildly progressive moderate retention of   stool throughout the colon.       This report was finalized on 7/10/2022 2:49 PM by Dr. Troy Cisneros M.D.              Assessment & Plan     Patient Active  Problem List   Diagnosis   • Benign essential hypertension   • Borderline hyperglycemia   • Gastroesophageal reflux disease   • Hyperlipidemia   • Migraine   • Heart murmur   • Right fascicular block   • Acute thoracic back pain   • Dyspnea on exertion   • Recurrent vertigo   • Generalized osteoarthritis of multiple sites   • Diastolic dysfunction   • Chronic fatigue   • Mild concentric left ventricular hypertrophy (LVH)   • Hypercalcemia   • Hematoma   • Generalized anxiety disorder   • Chronic migraine without aura without status migrainosus, not intractable   • RBBB   • Abdominal pain   • UTI (urinary tract infection)   • Nausea and vomiting   • Black stool   • Abnormal CT of the abdomen   • Bacteremia   • Chronic atrial fibrillation with rapid ventricular response (HCC)   • Hyponatremia   • E. coli colitis       Assessment:  1. Nominal pain  2. Diarrhea  3. Colitis on CAT scan  4. Colitis on c/s      Plan:  · Path pending  · Continue Flagyl and Levaquin  · Okay for Lovenox  · Continue Protonix  · Further recommendations after we review the pathology  I discussed the patients findings and my recommendations with patient and nursing staff.    Mj Kim MD

## 2022-07-15 NOTE — CASE MANAGEMENT/SOCIAL WORK
Continued Stay Note  [facility]     Patient Name: Sirisha Auguste  MRN: [id number]  Today's Date: 7/15/2022    Admit Date: 7/10/2022     Discharge Plan     Row Name 07/15/22 1115       Plan    Plan Home with [facility]    Plan Comments Met with the patient at bedside and confirmed her plans to return home upon d/c where she lives alone. The patient requested an in home aide and was informed that service is not covered by insurance and was provided with a private pay in home caregivers list.  The patient requested information on obtaining a new PCP and was provided with the contact number for the new patient appointment liaison. The patient requested HH P.T. and denied any nursing needs. The patient was agreeable to using [facility] and referral was made to [facility] with [facility]. The patient states that her daughter  will transport her home upon d/c.  [facility] will follow to assist with the patient’s d/c home with [facility]. KATHLEEN PACE               Discharge Codes    No documentation.               Expected Discharge Date and Time     Expected Discharge Date Expected Discharge Time    Jul 15, 2022             KATHLEEN Escobar

## 2022-07-15 NOTE — PROGRESS NOTES
[facility] will follow post hospital as ordered. Patient/daughter agreeable to service. Contact information confirmed. Please call daughter at [phone] to schedule home health visits. Note- daughter will likely take patient to stay with her at her home in the very near future; daughter has been informed to notify home health staff when move occurs so home health can locate patient.

## 2022-07-15 NOTE — PLAN OF CARE
Goal Outcome Evaluation:  Plan of Care Reviewed With: patient        Progress: no change  Outcome Evaluation: VSS. Complained of cramping in abdomen - TUMS x1 dose ordered and administered. PRN xanax given per request. Pericolace refused this shift d/t continued loose stools. +Up with assistance to bathroom. SR continued on monitor with no issues. No other changes this shift. WCM

## 2022-07-15 NOTE — CASE MANAGEMENT/SOCIAL WORK
Case Management Discharge Note      Final Note: Home with family assistance and [facility] KATHLEEN TORRES T.D         Selected Continued Care - Discharged on 7/15/2022 Admission date: 7/10/2022 - Discharge disposition: Home-Health Care Svc    Destination    No services have been selected for the patient.              Durable Medical Equipment    No services have been selected for the patient.              Dialysis/Infusion    No services have been selected for the patient.              Home Medical Care Coordination complete.    Service Provider Selected Services Address Phone Fax Patient Preferred     [facility] [address] [address] [phone] --          Therapy    No services have been selected for the patient.              Community Resources    No services have been selected for the patient.              Community & DME    No services have been selected for the patient.                  Transportation Services  Private: Car    Final Discharge Disposition Code: 06 - home with home health care

## 2022-07-15 NOTE — PLAN OF CARE
Goal Outcome Evaluation:  Plan of Care Reviewed With: patient        Progress: improving  Patient tolerating diet, discharged home with daughter and HH.

## 2022-07-16 NOTE — OUTREACH NOTE
Prep Survey    Flowsheet Row Responses   [place] facility patient discharged from? [place]   Is LACE score < 7 ? No   Emergency Room discharge w/ pulse ox? No   Eligibility Readm Mgmt   Discharge diagnosis E. coli colitis ,  Bacteremia   Does the patient have one of the following disease processes/diagnoses(primary or secondary)? Other   Does the patient have Home health ordered? Yes   What is the Home health agency?   [facility]   Is there a DME ordered? No   Comments regarding appointments See AVS   Medication alerts for this patient [facility]   General alerts for this patient ASA discontinued   Prep survey completed? Yes          JAIDA ODONNELL - Registered Nurse

## 2022-07-17 ENCOUNTER — HOME CARE VISIT (OUTPATIENT)
Dept: HOME HEALTH SERVICES | Facility: HOME HEALTHCARE | Age: 87
End: 2022-07-17

## 2022-07-17 VITALS
DIASTOLIC BLOOD PRESSURE: 74 MMHG | SYSTOLIC BLOOD PRESSURE: 130 MMHG | TEMPERATURE: 97.9 F | BODY MASS INDEX: 21.85 KG/M2 | HEIGHT: 64 IN | HEART RATE: 74 BPM | OXYGEN SATURATION: 95 % | WEIGHT: 128 LBS | RESPIRATION RATE: 18 BRPM

## 2022-07-17 PROCEDURE — G0299 HHS/HOSPICE OF RN EA 15 MIN: HCPCS

## 2022-07-18 ENCOUNTER — HOME CARE VISIT (OUTPATIENT)
Dept: HOME HEALTH SERVICES | Facility: HOME HEALTHCARE | Age: 87
End: 2022-07-18

## 2022-07-18 VITALS
TEMPERATURE: 97.5 F | OXYGEN SATURATION: 96 % | SYSTOLIC BLOOD PRESSURE: 126 MMHG | RESPIRATION RATE: 18 BRPM | HEART RATE: 76 BPM | DIASTOLIC BLOOD PRESSURE: 70 MMHG

## 2022-07-18 LAB
BACTERIA SPEC AEROBE CULT: NORMAL
BACTERIA SPEC AEROBE CULT: NORMAL

## 2022-07-18 PROCEDURE — G0151 HHCP-SERV OF PT,EA 15 MIN: HCPCS

## 2022-07-18 NOTE — HOME HEALTH
SOC Note:         Patient is A&O x4 but she reports having some periods of forgetfullness. She will be living with daughter for the next week. Vitals appear to be stable. no edema. lungs are mostly clear with some fine crackles noted only in the RLF.             Patient reported that on the date 7/16 she started having calf pain in bilat lower legs, increased weakness, and shakiness. Legs are not swollen, red or warm. reports pain only occurs when she is walking. States that hospital had her taking lipitor in the hospital which in the past has given her muscle pain. She stopped the lipitor and restarted the vytorin on 7/16. Complaint of still having some episodes of diarrhea but is getting better. She is starting to get a better appetite. Briefly educated on hydration and nutrition needs of body.                      SN to continue to perform all body system assessments and teaching on new diagnosis and new medications.                      New medications of levaquin, flagyl                      Follow up that 1 week appt with PCP made, cardiology in 2 weeks, and GI in 2 weeks.                      Reason for Hosp/Primary Dx/Co-morbidities: REcent hospitalization at [facility] from 7/10-7/15 for e. coli colitis, bacteremia, chronic afib with the resolved diagnosis of hyponatremia, UTI, nausea and vomiting                      Reported from daughter that she also had low magnesium followed by high magnesium after magnesium infusion.                      Focus of Care: Assessments and teaching regarding e.coli colitis

## 2022-07-18 NOTE — HOME HEALTH
SUBJECTIVE:     DIAGNOSIS: At [facility] [id number] with e. coli colitis, bacteremia; after getting home complaining of bilateral calf muscle pain so stopped taking Lipitor which has given her muscle pains in past    PAST MEDICAL HX: HTN, GERD, HLD, Renal insufficiency, heart murmur, vertigo, diastolic dysfunction, chronic migraines;       PRIOR LEVEL OF FUNCTION:      PLAN FOR NEXT VISIT:

## 2022-07-19 ENCOUNTER — READMISSION MANAGEMENT (OUTPATIENT)
Dept: CALL CENTER | Facility: HOSPITAL | Age: 87
End: 2022-07-19

## 2022-07-19 ENCOUNTER — HOME CARE VISIT (OUTPATIENT)
Dept: HOME HEALTH SERVICES | Facility: HOME HEALTHCARE | Age: 87
End: 2022-07-19

## 2022-07-19 VITALS
HEART RATE: 75 BPM | DIASTOLIC BLOOD PRESSURE: 70 MMHG | SYSTOLIC BLOOD PRESSURE: 130 MMHG | RESPIRATION RATE: 18 BRPM | TEMPERATURE: 97.5 F | OXYGEN SATURATION: 97 %

## 2022-07-19 PROCEDURE — G0300 HHS/HOSPICE OF LPN EA 15 MIN: HCPCS

## 2022-07-19 NOTE — OUTREACH NOTE
Medical Week 1 Survey    Flowsheet Row Responses   [facility] patient discharged from? [place]   Does the patient have one of the following disease processes/diagnoses(primary or secondary)? Other   Week 1 attempt successful? Yes   Call start time 1243   Call end time 1251   Discharge diagnosis E. coli colitis ,  Bacteremia   Is patient permission given to speak with other caregiver? Yes   Person spoke with today (if not patient) and relationship [place]/daughter    Meds reviewed with patient/caregiver? Yes   Is the patient having any side effects they believe may be caused by any medication additions or changes? No   Does the patient have all medications ordered at discharge? Yes   Is the patient taking all medications as directed (includes completed medication regime)? Yes   Does the patient have a primary care provider?  Yes   Does the patient have an appointment with their PCP within 7 days of discharge? No   What is preventing the patient from scheduling follow up appointments within 7 days of discharge? Haven't had time   Nursing Interventions Educated patient on importance of making appointment, Advised patient to make appointment   Has the patient kept scheduled appointments due by today? N/A   What is the Home health agency?   [facility]    Has home health visited the patient within 72 hours of discharge? Yes   Home health comments Daughter/patient wish to cancel PT.    Psychosocial issues? No   Did the patient receive a copy of their discharge instructions? Yes   Nursing interventions Reviewed instructions with patient   What is the patient's perception of their health status since discharge? Improving   Is the patient/caregiver able to teach back signs and symptoms related to disease process for when to call PCP? Yes   Is the patient/caregiver able to teach back signs and symptoms related to disease process for when to call 911? Yes   Is the patient/caregiver able to teach back the hierarchy of who to  call/visit for symptoms/problems? PCP, Specialist, Home health nurse, Urgent Care, ED, 911 Yes   If the patient is a current smoker, are they able to teach back resources for cessation? Not a smoker   Week 1 call completed? Yes          BRYAN SANTOS - Registered Nurse

## 2022-07-20 ENCOUNTER — HOME CARE VISIT (OUTPATIENT)
Dept: HOME HEALTH SERVICES | Facility: HOME HEALTHCARE | Age: 87
End: 2022-07-20

## 2022-07-20 VITALS
TEMPERATURE: 97.9 F | SYSTOLIC BLOOD PRESSURE: 130 MMHG | DIASTOLIC BLOOD PRESSURE: 70 MMHG | OXYGEN SATURATION: 97 % | HEART RATE: 71 BPM

## 2022-07-20 PROCEDURE — G0151 HHCP-SERV OF PT,EA 15 MIN: HCPCS

## 2022-07-20 NOTE — HOME HEALTH
"Subjective: \"The leg soreness has moved down into my feet and up into my hips sometimes, so the pain has not changed.\"    Falls reported: none    Medication changes: none    Plan for next visit: Continue gait training with cane increasing attempts/distances as tolerated if less pain in legs, review/upgrade HEP if less pain in legs adding further standing exercises, and patient daughter education as needed"

## 2022-07-20 NOTE — HOME HEALTH
SCRIBE #1 NOTE: I, Teena Sellers, am scribing for, and in the presence of, Aamir Ramsey MD. I have scribed the entire note.       History     Chief Complaint   Patient presents with    Possible Pregnancy     patient states her last cycle was Feb 28th, c/o nausea      Review of patient's allergies indicates:  No Known Allergies      History of Present Illness     HPI    4/9/2019, 7:54 AM  History obtained from the patient      History of Present Illness: Miriam Jett is a 22 y.o. female patient with a PMHx of miscarriage who presents to the Emergency Department for evaluation of nausea which onset gradually this AM. Symptoms are constant and moderate in severity. No mitigating or exacerbating factors reported. No associated sxs. Pt states she her LMP was 02/28, and she had 3 home pregnancy tests at home. Patient denies any fever, chills, SOB, CP, sore throat, v/d, abd pain, dysuria, freuqency, and all other sxs at this time. No prior tx. No further complaints or concerns at this time.       Arrival mode: Personal vehicle     PCP: Primary Doctor No        Past Medical History:  Past Medical History:   Diagnosis Date    Miscarriage        Past Surgical History:  History reviewed. No pertinent surgical history.      Family History:  Family History   Problem Relation Age of Onset    Birth defects Neg Hx        Social History:  Social History     Tobacco Use    Smoking status: Never Smoker    Smokeless tobacco: Never Used   Substance and Sexual Activity    Alcohol use: No    Drug use: No    Sexual activity: Yes     Partners: Male     Birth control/protection: None        Review of Systems     Review of Systems   Constitutional: Negative for chills and fever.   HENT: Negative for sore throat.    Respiratory: Negative for shortness of breath.    Cardiovascular: Negative for chest pain.   Gastrointestinal: Positive for nausea. Negative for abdominal pain, diarrhea and vomiting.   Genitourinary:  SNV for cp assessment and t/i on Colitis  Patient continues on ATB and Flagyl  Patient voiced bowels loose but no diarrhea  Patient continues to c/o leg pain. Patient voiced in hospital she was given Lipitor and this is SE from medication  Patient continues with cognitive iises "Negative for dysuria, frequency and pelvic pain.   Musculoskeletal: Negative for back pain and neck pain.   Skin: Negative for rash.   Neurological: Negative for dizziness and weakness.   Hematological: Does not bruise/bleed easily.   All other systems reviewed and are negative.       Physical Exam     Initial Vitals [04/09/19 0744]   BP Pulse Resp Temp SpO2   129/81 66 18 98.1 °F (36.7 °C) 99 %      MAP       --          Physical Exam  Nursing Notes and Vital Signs Reviewed.  Constitutional: Patient is in no acute distress. Well-developed and well-nourished.  Head: Atraumatic. Normocephalic.  Eyes: PERRL. EOM intact. Conjunctivae are not pale. No scleral icterus.  ENT: Mucous membranes are moist. Oropharynx is clear and symmetric.    Neck: Supple. Full ROM. No lymphadenopathy.  Cardiovascular: Regular rate. Regular rhythm. No murmurs, rubs, or gallops. Distal pulses are 2+ and symmetric.  Pulmonary/Chest: No respiratory distress. Clear to auscultation bilaterally. No wheezing or rales.  Abdominal: Soft and non-distended.  There is no tenderness.  No rebound, guarding, or rigidity. Good bowel sounds.  Genitourinary: No CVA tenderness  Musculoskeletal: Moves all extremities. No obvious deformities. No edema. No calf tenderness.  Skin: Warm and dry.  Neurological:  Alert, awake, and appropriate.  Normal speech.  No acute focal neurological deficits are appreciated.  Psychiatric: Normal affect. Good eye contact. Appropriate in content.     ED Course   Procedures  ED Vital Signs:  Vitals:    04/09/19 0744 04/09/19 0925   BP: 129/81 117/74   Pulse: 66 68   Resp: 18 20   Temp: 98.1 °F (36.7 °C) 98.1 °F (36.7 °C)   TempSrc: Oral    SpO2: 99% 99%   Weight: 58.6 kg (129 lb 1.3 oz)    Height: 5' 7" (1.702 m)        Abnormal Lab Results:  Labs Reviewed   URINALYSIS, REFLEX TO URINE CULTURE    Narrative:     Preferred Collection Type->Urine, Clean Catch   PREGNANCY TEST, URINE RAPID   HCG, QUANTITATIVE, PREGNANCY   HIV 1 / 2 " ANTIBODY        All Lab Results:  Results for orders placed or performed during the hospital encounter of 04/09/19   Urinalysis, Reflex to Urine Culture Urine, Clean Catch   Result Value Ref Range    Specimen UA Urine, Clean Catch     Color, UA Yellow Yellow, Straw, Nany    Appearance, UA Clear Clear    pH, UA 6.0 5.0 - 8.0    Specific Gravity, UA 1.020 1.005 - 1.030    Protein, UA Negative Negative    Glucose, UA Negative Negative    Ketones, UA Negative Negative    Bilirubin (UA) Negative Negative    Occult Blood UA Negative Negative    Nitrite, UA Negative Negative    Urobilinogen, UA Negative <2.0 EU/dL    Leukocytes, UA Negative Negative   Pregnancy, urine rapid   Result Value Ref Range    Preg Test, Ur Negative    hCG, quantitative   Result Value Ref Range    hCG Quant <1.2 See Text mIU/mL              The Emergency Provider reviewed the vital signs and test results, which are outlined above.     ED Discussion     9:21 AM: Reassessed pt at this time. Patient is awake, alert, and in NAD. Pt states her condition has improved at this time. Discussed with pt all pertinent ED information and results. Discussed pt dx and plan of tx. Gave pt all f/u and return to the ED instructions. All questions and concerns were addressed at this time. Pt expresses understanding of information and instructions, and is comfortable with plan to discharge. Pt is stable for discharge.    I discussed with patient and/or family/caretaker that evaluation in the ED does not suggest any emergent or life threatening medical conditions requiring immediate intervention beyond what was provided in the ED, and I believe patient is safe for discharge.  Regardless, an unremarkable evaluation in the ED does not preclude the development or presence of a serious of life threatening condition. As such, patient was instructed to return immediately for any worsening or change in current symptoms.    ED Medication(s):  Medications - No data to  display    Discharge Medication List as of 4/9/2019  9:21 AM      START taking these medications    Details   promethazine (PHENERGAN) 25 MG tablet Take 1 tablet (25 mg total) by mouth every 6 (six) hours as needed for Nausea., Starting Tue 4/9/2019, Print             Follow-up Information     [facility] In 2 days.    Contact information:  [address] [facility] [address] [phone]                         Medical Decision Making:   Clinical Tests:   Lab Tests: Ordered and Reviewed             Scribe Attestation:   Scribe #1: I performed the above scribed service and the documentation accurately describes the services I performed. I attest to the accuracy of the note.     Attending:   Physician Attestation Statement for Scribe #1: I, Aamir Ramsey MD, personally performed the services described in this documentation, as scribed by Teena Sellers, in my presence, and it is both accurate and complete.           Clinical Impression       ICD-10-CM ICD-9-CM   1. Nausea R11.0 787.02   2. Metrorrhagia N92.1 626.6       Disposition:   Disposition: Discharged  Condition: Stable         Aamir Ramsey MD  04/09/19 0935

## 2022-07-21 ENCOUNTER — HOME CARE VISIT (OUTPATIENT)
Dept: HOME HEALTH SERVICES | Facility: HOME HEALTHCARE | Age: 87
End: 2022-07-21

## 2022-07-21 VITALS
SYSTOLIC BLOOD PRESSURE: 120 MMHG | HEART RATE: 79 BPM | DIASTOLIC BLOOD PRESSURE: 70 MMHG | OXYGEN SATURATION: 98 % | RESPIRATION RATE: 18 BRPM | TEMPERATURE: 97.5 F

## 2022-07-21 LAB
BACTERIA SPEC AEROBE CULT: ABNORMAL
GRAM STN SPEC: ABNORMAL
ISOLATED FROM: ABNORMAL

## 2022-07-21 PROCEDURE — G0300 HHS/HOSPICE OF LPN EA 15 MIN: HCPCS

## 2022-07-21 NOTE — HOME HEALTH
SNV for cp assessment and t/i on Colitis  Patient completed ATB. Patient has falt rash on abdomen and close to periarea. Patient voiced area itches  Nurse  spoke with Dr. Harris ( on call for  DUY Kasper) and prescription sent to patient pharamxy. Patient has appointment with PCP next week  Patient continues to c/o BLE pain. Therapy in progress

## 2022-07-23 NOTE — PROGRESS NOTES
07/23/22       Tell her that the colon biopsies came back looking OK with no evidence of colitis. We can discuss in more detail when she is seen in the office.   Torri. kjh

## 2022-07-25 ENCOUNTER — TELEPHONE (OUTPATIENT)
Dept: GASTROENTEROLOGY | Facility: CLINIC | Age: 87
End: 2022-07-25

## 2022-07-25 NOTE — TELEPHONE ENCOUNTER
----- Message from Henrik Gaston MD sent at 7/23/2022  4:46 PM EDT -----  07/23/22       Tell her that the colon biopsies came back looking OK with no evidence of colitis. We can discuss in more detail when she is seen in the office.   Thx. kjh

## 2022-07-26 ENCOUNTER — HOME CARE VISIT (OUTPATIENT)
Dept: HOME HEALTH SERVICES | Facility: HOME HEALTHCARE | Age: 87
End: 2022-07-26

## 2022-07-26 PROCEDURE — G0300 HHS/HOSPICE OF LPN EA 15 MIN: HCPCS

## 2022-07-26 NOTE — CASE COMMUNICATION
Dear Majo Reynolds NP    Re: Sirisha Auguste  : [id number]     The PT visit on 726 for the above patient was missed due to dtr called to cancel PT this week due to patient not feeling well/getting worse and has MD appt tomorrow, therefore, the prescribed frequency of visits was not met.    If you have questions or would like further information about this patient, please contact us at [phone].    Regards, Logan Singer PT

## 2022-07-27 ENCOUNTER — OFFICE VISIT (OUTPATIENT)
Dept: GASTROENTEROLOGY | Facility: CLINIC | Age: 87
End: 2022-07-27

## 2022-07-27 ENCOUNTER — TELEPHONE (OUTPATIENT)
Dept: GASTROENTEROLOGY | Facility: CLINIC | Age: 87
End: 2022-07-27

## 2022-07-27 VITALS
BODY MASS INDEX: 21.24 KG/M2 | WEIGHT: 124.4 LBS | TEMPERATURE: 97.5 F | DIASTOLIC BLOOD PRESSURE: 81 MMHG | HEIGHT: 64 IN | SYSTOLIC BLOOD PRESSURE: 131 MMHG

## 2022-07-27 VITALS
DIASTOLIC BLOOD PRESSURE: 70 MMHG | OXYGEN SATURATION: 97 % | HEART RATE: 63 BPM | RESPIRATION RATE: 18 BRPM | SYSTOLIC BLOOD PRESSURE: 120 MMHG | TEMPERATURE: 97.5 F

## 2022-07-27 DIAGNOSIS — K52.9 COLITIS: ICD-10-CM

## 2022-07-27 DIAGNOSIS — R10.30 LOWER ABDOMINAL PAIN: Primary | ICD-10-CM

## 2022-07-27 PROCEDURE — 99214 OFFICE O/P EST MOD 30 MIN: CPT | Performed by: PHYSICIAN ASSISTANT

## 2022-07-27 NOTE — TELEPHONE ENCOUNTER
----- Message from Yoselyn Gates MD sent at 7/26/2022  4:24 PM EDT -----  Mild chronic nonspecific inflammation was seen on stomach biopsy.

## 2022-07-27 NOTE — PROGRESS NOTES
Chief Complaint  Abdominal Cramping    Subjective          History of Present Illness  Sirisha Auguste is a  87 y.o. female here for hospital follow-up for colitis.  She is a patient of Dr. Gates, new to me today.  She is accompanied today's appointment by her daughter, Marcos.  She continues to report cramping lower abdominal pain.  He also endorses some constipation.  She denies nausea, vomiting, heartburn/reflux, melena or hematochezia.    During hospitalization she underwent a flexible sigmoidoscopy by Dr. Gaston which was terminated around 20 cm due to significant colitis with some ulceration.  Pathology returned with some surface hyperplastic changes otherwise no significant histopathological findings.    Past Medical History:   Diagnosis Date   • Arthritis    • Breast cyst    • GERD (gastroesophageal reflux disease)    • Hyperglycemia    • Hyperlipidemia    • Hypertension    • Migraines    • Murmur    • RBBB        Past Surgical History:   Procedure Laterality Date   • BLADDER SURGERY     • BREAST CYST ASPIRATION     • COLONOSCOPY N/A 7/14/2022    Procedure: COLONOSCOPY TO 20CM WITH COLD BIOPSIES;  Surgeon: Henrik Gaston MD;  Location: [facility] ENDOSCOPY;  Service: Gastroenterology;  Laterality: N/A;  PRE- ABNORMAL CT, BLACK TARRY STOOL, ABDOMINAL PAIN  POST- COLITIS   • ENDOSCOPY N/A 7/12/2022    Procedure: ESOPHAGOGASTRODUODENOSCOPY WITH COLD BIOPSIES;  Surgeon: Yoselyn Gates MD;  Location: [facility] ENDOSCOPY;  Service: Gastroenterology;  Laterality: N/A;  PRE- DYSPEPSIA, MELENA  POST- MILD GASTRITIS, SMALL HIATAL HERNIA   • HYSTERECTOMY     • OOPHORECTOMY         Family History   Problem Relation Age of Onset   • Cancer Sister         breast   • Breast cancer Sister    • Cancer Maternal Aunt         breast   • Breast cancer Maternal Aunt        Social History     Socioeconomic History   • Marital status:    Tobacco Use   • Smoking status: Former Smoker   • Smokeless tobacco: Never Used   • Tobacco  comment: When she was 18   Substance and Sexual Activity   • Alcohol use: Never   • Drug use: No   • Sexual activity: Not Currently       Allergies   Allergen Reactions   • Atorvastatin Myalgia   • Sulfa Antibiotics Other (See Comments)     unknown       Current Outpatient Medications on File Prior to Visit   Medication Sig Dispense Refill   • ALPRAZolam (XANAX) 0.5 MG tablet TAKE 1 TABLET AT NIGHT AS NEEDED FOR ANXIETY 90 tablet 1   • diclofenac (VOLTAREN) 1 % gel gel Apply 4 g topically 4 (Four) Times a Day As Needed.     • esomeprazole (nexIUM) 40 MG capsule TAKE 1 CAPSULE EVERY MORNING BEFORE BREAKFAST 90 capsule 3   • estradiol (ESTRACE) 0.1 MG/GM vaginal cream Insert 2 g into the vagina Daily.     • meclizine (ANTIVERT) 25 MG tablet Take 1 tablet by mouth 3 (Three) Times a Day As Needed for dizziness. 180 tablet 1   • metoprolol succinate XL (TOPROL-XL) 25 MG 24 hr tablet Take 3 tablets by mouth Daily. 30 tablet 3   • montelukast (SINGULAIR) 10 MG tablet TAKE 1 TABLET EVERY NIGHT 90 tablet 3   • Multiple Minerals-Vitamins (ADVANCED CALCIUM/D/MAGNESIUM) tablet Take  by mouth. not taking     • Multiple Vitamins-Minerals (MULTIVITAMIN & MINERAL PO) Take 1 tablet by mouth Daily.     • Potassium Gluconate 550 MG tablet Take 1 tablet by mouth Daily.     • Probiotic Product (PROBIOTIC PO) Take  by mouth Every Other Day.     • SUMAtriptan (IMITREX) 100 MG tablet TAKE 1 TABLET AT ONSET OF HEADACHE. MAY REPEAT DOSE ONE TIME IN 2 HOURS IF HEADACHE NOT RELIEVED 810 tablet 3   • VITAMIN D, CHOLECALCIFEROL, PO Take 2,000 Units by mouth Daily.     • [DISCONTINUED] ezetimibe-simvastatin (VYTORIN) 10-20 MG per tablet TAKE 1 TABLET EVERY NIGHT 90 tablet 3     No current facility-administered medications on file prior to visit.       Review of Systems   Constitutional: Positive for appetite change (Decreased appetite).   HENT: Negative for trouble swallowing.    Respiratory: Negative for cough and shortness of breath.   "  Cardiovascular: Negative for chest pain and palpitations.   Gastrointestinal: Positive for abdominal pain and constipation. Negative for abdominal distention, blood in stool, diarrhea, nausea, vomiting and GERD.        Objective   Vital Signs:   /81   Temp 97.5 °F (36.4 °C)   Ht 162.6 cm (64\")   Wt 56.4 kg (124 lb 6.4 oz)   BMI 21.35 kg/m²       Physical Exam  Vitals and nursing note reviewed.   Constitutional:       General: She is not in acute distress.     Appearance: Normal appearance. She is not ill-appearing.   HENT:      Head: Normocephalic and atraumatic.      Right Ear: External ear normal.      Left Ear: External ear normal.   Eyes:      General: No scleral icterus.     Conjunctiva/sclera: Conjunctivae normal.      Pupils: Pupils are equal, round, and reactive to light.   Cardiovascular:      Rate and Rhythm: Normal rate and regular rhythm.      Heart sounds: Normal heart sounds.   Pulmonary:      Effort: Pulmonary effort is normal.      Breath sounds: Normal breath sounds.   Abdominal:      General: Abdomen is flat. Bowel sounds are normal. There is no distension.      Palpations: Abdomen is soft.      Tenderness: There is no abdominal tenderness. There is no guarding or rebound.   Musculoskeletal:      Cervical back: Normal range of motion and neck supple.   Skin:     General: Skin is warm and dry.   Neurological:      Mental Status: She is alert and oriented to person, place, and time.   Psychiatric:         Mood and Affect: Mood normal.         Behavior: Behavior normal.          Result Review :       Common labs    Common Labsle 7/13/22 7/13/22 7/13/22 7/14/22 7/14/22 7/15/22 7/15/22    0710 0710 1605 0442 0442 0542 0542   Glucose  104 (A)  96   101 (A)   BUN  7 (A)  5 (A)   8   Creatinine  0.69  0.73   0.71   Sodium  129 (A)  132 (A)   135 (A)   Potassium  3.5  4.1   3.6   Chloride  96 (A)  99   101   Calcium  9.3  9.6   9.6   WBC 9.72    4.63 4.37    Hemoglobin 12.5    12.1 12.2  "   Hematocrit 36.1    35.6 36.7    Platelets 196    194 204    Uric Acid   2.9       (A) Abnormal value       Comments are available for some flowsheets but are not being displayed.                               Assessment and Plan    Diagnoses and all orders for this visit:    1. Lower abdominal pain (Primary)    2. Colitis      · Low residue diet for total of 4 weeks and we discussed beginning to add in fiber supplementation and increasing dietary fiber.  · Recommend daily MiraLAX with titration based on bowel habits.  · Trial of IBgard for cramping abdominal pain.  Some samples were provided in the office today.  · Patient does not wish to undergo colonoscopy at this time.  · Discussed with Dr. Gates, will plan to repeat CT with oral contrast in 6 to 8 weeks.  Would like to proceed with at minimum flexible sigmoidoscopy without full, formal bowel prep in the next 12 weeks.      Follow Up   Return in about 6 weeks (around 9/7/2022).    Dragon dictation used throughout this note.     AGUSTO Mulligan

## 2022-07-27 NOTE — HOME HEALTH
"SNV for cp assessment and t/i on Colitis  Nurse spoke to patient daughter prior to arrival. Daughter voiced, \" patient c/o abdominal cramping.\" patient has appoitment with GI tomorrow.  Nurse called GI MD Dr. Gates to see if patient can be seen sooner. Appointment made tomorrow @ 0365  Patient stated, \" cramping improved after passing gas."

## 2022-07-28 ENCOUNTER — HOME CARE VISIT (OUTPATIENT)
Dept: HOME HEALTH SERVICES | Facility: HOME HEALTHCARE | Age: 87
End: 2022-07-28

## 2022-07-28 RX ORDER — EZETIMIBE AND SIMVASTATIN 10; 20 MG/1; MG/1
TABLET ORAL
Qty: 90 TABLET | Refills: 3 | Status: SHIPPED | OUTPATIENT
Start: 2022-07-28 | End: 2023-02-08 | Stop reason: SDUPTHER

## 2022-07-28 NOTE — CASE COMMUNICATION
Dear Majo Reynolds NP     Re: Sirisha Auguste   : [id number]     The PT visit on 728 for the above patient was missed due to dtr had called earlier in week and canceled PT this week due to patient not feeling well/getting worse, with PT to recontact daughter next week to see if PT can return, therefore, the prescribed frequency of visits was not met.     If you have questions or would like further information about this patient, pleas e contact us at [phone].     Regards, Logan Singer PT

## 2022-07-29 NOTE — CASE COMMUNICATION
7.28.22 NURSE SPOKE TO DAUGHTER VIA PHONE. DAUGHTER REQUEST NO SNV TODAY BUT AGREED TO TELEPHONE VISIT. PATIENT WAS SEEN IN GI OFFICE YESTERDAY BY DUY. DAUGHTER STATED DUY WEINSTEIN PATIENT CONSTIPATED AND INFORMED TO TAKE STOOL SOFTNER AND CONSUME PEPPERMINT TO DENYS CRAMPING. DAUGHTER VOICED PATIENT MAY GO HOME NEXT WEEK DEPENDS ON HOW SHE FEELS.

## 2022-08-02 ENCOUNTER — HOME CARE VISIT (OUTPATIENT)
Dept: HOME HEALTH SERVICES | Facility: HOME HEALTHCARE | Age: 87
End: 2022-08-02

## 2022-08-02 VITALS
DIASTOLIC BLOOD PRESSURE: 70 MMHG | OXYGEN SATURATION: 96 % | RESPIRATION RATE: 18 BRPM | SYSTOLIC BLOOD PRESSURE: 118 MMHG | HEART RATE: 69 BPM | TEMPERATURE: 97.6 F

## 2022-08-02 PROCEDURE — G0299 HHS/HOSPICE OF RN EA 15 MIN: HCPCS

## 2022-08-03 NOTE — HOME HEALTH
Patient feel she is improving overall.  States she still has bloating at times, discussed laxatives, fiber intake and water intake.  CP assessment WNL.  Daughter interested in finding a Geriatrician, I pulled up results online and found ones close to their house, also reccomended they speak with PCP for their reccomendations.  Discussed discharge next week and daughter and patient seem agreeable at this time.

## 2022-08-04 ENCOUNTER — HOME CARE VISIT (OUTPATIENT)
Dept: HOME HEALTH SERVICES | Facility: HOME HEALTHCARE | Age: 87
End: 2022-08-04

## 2022-08-04 VITALS
DIASTOLIC BLOOD PRESSURE: 70 MMHG | HEART RATE: 76 BPM | RESPIRATION RATE: 18 BRPM | SYSTOLIC BLOOD PRESSURE: 120 MMHG | TEMPERATURE: 97.4 F | OXYGEN SATURATION: 98 %

## 2022-08-04 PROCEDURE — G0300 HHS/HOSPICE OF LPN EA 15 MIN: HCPCS

## 2022-08-04 NOTE — HOME HEALTH
Patient doing well. No coloitis flare up per patient. Stools normal  Nurse discussed food to avoid d/t flare ups and food to eat  Next visit: Discharged

## 2022-08-05 ENCOUNTER — HOME CARE VISIT (OUTPATIENT)
Dept: HOME HEALTH SERVICES | Facility: HOME HEALTHCARE | Age: 87
End: 2022-08-05

## 2022-08-05 VITALS
SYSTOLIC BLOOD PRESSURE: 118 MMHG | DIASTOLIC BLOOD PRESSURE: 70 MMHG | HEART RATE: 57 BPM | OXYGEN SATURATION: 98 % | TEMPERATURE: 97.7 F | RESPIRATION RATE: 18 BRPM

## 2022-08-05 PROCEDURE — G0151 HHCP-SERV OF PT,EA 15 MIN: HCPCS

## 2022-08-10 ENCOUNTER — HOME CARE VISIT (OUTPATIENT)
Dept: HOME HEALTH SERVICES | Facility: HOME HEALTHCARE | Age: 87
End: 2022-08-10

## 2022-08-10 VITALS
TEMPERATURE: 97.5 F | SYSTOLIC BLOOD PRESSURE: 128 MMHG | OXYGEN SATURATION: 96 % | RESPIRATION RATE: 18 BRPM | HEART RATE: 59 BPM | DIASTOLIC BLOOD PRESSURE: 64 MMHG

## 2022-08-10 PROCEDURE — G0299 HHS/HOSPICE OF RN EA 15 MIN: HCPCS

## 2022-10-06 ENCOUNTER — TELEPHONE (OUTPATIENT)
Dept: INTERNAL MEDICINE | Facility: CLINIC | Age: 87
End: 2022-10-06

## 2022-10-06 NOTE — TELEPHONE ENCOUNTER
Caller: Sirisha Auguste    Relationship: Self    Best call back number: [phone]    What is the best time to reach you: ANY     Who are you requesting to speak with (clinical staff, provider,  specific staff member): DR. MAGDALENO     What was the call regarding: PATIENT STATES SHE IS WANTING TO ESTABLISH CARE WITH DR. MAGDALENO AND IS REQUESTING A CALL BACK TO FURTHER DISCUSS. SHE IS A FORMER PATIENT OF DEANA MCCORMICK NP.     Do you require a callback: YES

## 2022-10-17 ENCOUNTER — OFFICE VISIT (OUTPATIENT)
Dept: GASTROENTEROLOGY | Facility: CLINIC | Age: 87
End: 2022-10-17

## 2022-10-17 VITALS
OXYGEN SATURATION: 96 % | BODY MASS INDEX: 21.78 KG/M2 | HEART RATE: 54 BPM | WEIGHT: 127.6 LBS | DIASTOLIC BLOOD PRESSURE: 84 MMHG | TEMPERATURE: 97.5 F | SYSTOLIC BLOOD PRESSURE: 152 MMHG | HEIGHT: 64 IN

## 2022-10-17 DIAGNOSIS — R10.30 LOWER ABDOMINAL PAIN: ICD-10-CM

## 2022-10-17 DIAGNOSIS — K52.9 COLITIS: ICD-10-CM

## 2022-10-17 DIAGNOSIS — R93.5 ABNORMAL CT OF THE ABDOMEN: Primary | ICD-10-CM

## 2022-10-17 PROCEDURE — 99214 OFFICE O/P EST MOD 30 MIN: CPT | Performed by: PHYSICIAN ASSISTANT

## 2022-10-17 NOTE — PROGRESS NOTES
Chief Complaint  Abdominal Pain and Diarrhea    Subjective        History of Present Illness  Sirisha Auguste is a  87 y.o. female here for follow-up for colitis.  She is a patient of Dr. Gates and was last seen by me in the clinic on 7/27/2022.    She is accompanied today's appointment by her daughter.  Her bowels have improved.  She has noticed a couple episodes of loose stools but no ana blood or melena.  We did discuss the potential of undergoing a flexible sigmoidoscopy which she is agreeable to, she does not want to complete a full colonoscopy.  Only 2 episodes of cramping abdominal pain for the last 3 months.  She has not began a fiber supplement.    Colonoscopy attempted 7/14/2022 with nonspecific colitis from 15 to 20 cm.  There is also a degree of ulceration therefore procedure was aborted secondary to concern for potential perforation.      Past Medical History:   Diagnosis Date   • Arthritis    • Breast cyst    • GERD (gastroesophageal reflux disease)    • Hyperglycemia    • Hyperlipidemia    • Hypertension    • Migraines    • Murmur    • RBBB        Past Surgical History:   Procedure Laterality Date   • BLADDER SURGERY     • BREAST CYST ASPIRATION     • COLONOSCOPY N/A 7/14/2022    Procedure: COLONOSCOPY TO 20CM WITH COLD BIOPSIES;  Surgeon: Henrik Gaston MD;  Location: [facility] ENDOSCOPY;  Service: Gastroenterology;  Laterality: N/A;  PRE- ABNORMAL CT, BLACK TARRY STOOL, ABDOMINAL PAIN  POST- COLITIS   • ENDOSCOPY N/A 7/12/2022    Procedure: ESOPHAGOGASTRODUODENOSCOPY WITH COLD BIOPSIES;  Surgeon: Yoselyn Gates MD;  Location: [facility] ENDOSCOPY;  Service: Gastroenterology;  Laterality: N/A;  PRE- DYSPEPSIA, MELENA  POST- MILD GASTRITIS, SMALL HIATAL HERNIA   • HYSTERECTOMY     • OOPHORECTOMY         Family History   Problem Relation Age of Onset   • Cancer Sister         breast   • Breast cancer Sister    • Cancer Maternal Aunt         breast   • Breast cancer Maternal Aunt        Social History      Socioeconomic History   • Marital status:    Tobacco Use   • Smoking status: Former   • Smokeless tobacco: Never   • Tobacco comments:     When she was 18   Substance and Sexual Activity   • Alcohol use: Never   • Drug use: No   • Sexual activity: Not Currently       Allergies   Allergen Reactions   • Atorvastatin Myalgia   • Sulfa Antibiotics Other (See Comments)     unknown       Current Outpatient Medications on File Prior to Visit   Medication Sig Dispense Refill   • ALPRAZolam (XANAX) 0.5 MG tablet TAKE 1 TABLET AT NIGHT AS NEEDED FOR ANXIETY 90 tablet 1   • diclofenac (VOLTAREN) 1 % gel gel Apply 4 g topically 4 (Four) Times a Day As Needed.     • esomeprazole (nexIUM) 40 MG capsule TAKE 1 CAPSULE EVERY MORNING BEFORE BREAKFAST 90 capsule 3   • estradiol (ESTRACE) 0.1 MG/GM vaginal cream Insert 2 g into the vagina Daily.     • ezetimibe-simvastatin (VYTORIN) 10-20 MG per tablet TAKE 1 TABLET EVERY NIGHT 90 tablet 3   • meclizine (ANTIVERT) 25 MG tablet Take 1 tablet by mouth 3 (Three) Times a Day As Needed for dizziness. 180 tablet 1   • metoprolol succinate XL (TOPROL-XL) 25 MG 24 hr tablet Take 3 tablets by mouth Daily. 30 tablet 3   • Multiple Minerals-Vitamins (ADVANCED CALCIUM/D/MAGNESIUM) tablet Take  by mouth. not taking     • Multiple Vitamins-Minerals (MULTIVITAMIN & MINERAL PO) Take 1 tablet by mouth Daily.     • Probiotic Product (PROBIOTIC PO) Take  by mouth Every Other Day.     • SUMAtriptan (IMITREX) 100 MG tablet TAKE 1 TABLET AT ONSET OF HEADACHE. MAY REPEAT DOSE ONE TIME IN 2 HOURS IF HEADACHE NOT RELIEVED 810 tablet 3   • VITAMIN D, CHOLECALCIFEROL, PO Take 2,000 Units by mouth Daily.     • montelukast (SINGULAIR) 10 MG tablet TAKE 1 TABLET EVERY NIGHT 90 tablet 3   • Potassium Gluconate 550 MG tablet Take 1 tablet by mouth Daily.       No current facility-administered medications on file prior to visit.       Review of Systems     Objective   Vital Signs:   /84   Pulse 54   " Temp 97.5 °F (36.4 °C)   Ht 162.6 cm (64\")   Wt 57.9 kg (127 lb 9.6 oz)   SpO2 96%   BMI 21.90 kg/m²       Physical Exam     Result Review :       Common labs    Common Labs 7/13/22 7/13/22 7/13/22 7/14/22 7/14/22 7/15/22 7/15/22    0710 0710 1605 0442 0442 0542 0542   Glucose  104 (A)  96   101 (A)   BUN  7 (A)  5 (A)   8   Creatinine  0.69  0.73   0.71   Sodium  129 (A)  132 (A)   135 (A)   Potassium  3.5  4.1   3.6   Chloride  96 (A)  99   101   Calcium  9.3  9.6   9.6   WBC 9.72    4.63 4.37    Hemoglobin 12.5    12.1 12.2    Hematocrit 36.1    35.6 36.7    Platelets 196    194 204    Uric Acid   2.9       (A) Abnormal value       Comments are available for some flowsheets but are not being displayed.                               Assessment and Plan    Diagnoses and all orders for this visit:    1. Abnormal CT of the abdomen (Primary)  -     CT Abdomen Pelvis With Contrast  -     Case Request; Standing  -     Implement Anesthesia orders day of procedure.; Standing  -     Obtain informed consent; Standing  -     Verify bowel prep was successful; Standing  -     Give tap water enema if bowel prep was insufficient; Standing  -     Case Request    2. Colitis  -     Case Request; Standing  -     Implement Anesthesia orders day of procedure.; Standing  -     Obtain informed consent; Standing  -     Verify bowel prep was successful; Standing  -     Give tap water enema if bowel prep was insufficient; Standing  -     Case Request    3. Lower abdominal pain  -     Case Request; Standing  -     Implement Anesthesia orders day of procedure.; Standing  -     Obtain informed consent; Standing  -     Verify bowel prep was successful; Standing  -     Give tap water enema if bowel prep was insufficient; Standing  -     Case Request      · Repeat CT of the abdomen and pelvis.  · Discussed addition of fiber supplementation.  We also discussed the importance of adequate water intake.  · Patient is agreeable to proceed with " flexible sigmoidoscopy in the outpatient setting.  The benefits and risks (bleeding, perforation, anesthesia related complications) were discussed the patient.  Patient understands risks and agrees to proceed.      Follow Up   Return in about 3 months (around 1/17/2023).    Dragon dictation used throughout this note.     AGUSTO Mulligan

## 2022-10-21 ENCOUNTER — OFFICE VISIT (OUTPATIENT)
Dept: INTERNAL MEDICINE | Facility: CLINIC | Age: 87
End: 2022-10-21

## 2022-10-21 VITALS
TEMPERATURE: 97.3 F | OXYGEN SATURATION: 94 % | WEIGHT: 127.4 LBS | HEIGHT: 64 IN | DIASTOLIC BLOOD PRESSURE: 64 MMHG | HEART RATE: 63 BPM | SYSTOLIC BLOOD PRESSURE: 110 MMHG | BODY MASS INDEX: 21.75 KG/M2

## 2022-10-21 DIAGNOSIS — R30.0 DYSURIA: ICD-10-CM

## 2022-10-21 DIAGNOSIS — I48.20 CHRONIC ATRIAL FIBRILLATION WITH RAPID VENTRICULAR RESPONSE: ICD-10-CM

## 2022-10-21 DIAGNOSIS — K21.9 GASTROESOPHAGEAL REFLUX DISEASE, UNSPECIFIED WHETHER ESOPHAGITIS PRESENT: ICD-10-CM

## 2022-10-21 DIAGNOSIS — K52.9 COLITIS: Primary | ICD-10-CM

## 2022-10-21 DIAGNOSIS — G43.709 CHRONIC MIGRAINE WITHOUT AURA WITHOUT STATUS MIGRAINOSUS, NOT INTRACTABLE: ICD-10-CM

## 2022-10-21 DIAGNOSIS — R05.3 CHRONIC COUGH: ICD-10-CM

## 2022-10-21 DIAGNOSIS — F41.9 ANXIETY: ICD-10-CM

## 2022-10-21 PROCEDURE — 99214 OFFICE O/P EST MOD 30 MIN: CPT | Performed by: STUDENT IN AN ORGANIZED HEALTH CARE EDUCATION/TRAINING PROGRAM

## 2022-10-21 RX ORDER — METOPROLOL TARTRATE 75 MG/1
1 TABLET, FILM COATED ORAL DAILY
Qty: 90 TABLET | Refills: 2 | Status: CANCELLED | OUTPATIENT
Start: 2022-10-21

## 2022-10-21 RX ORDER — PROMETHAZINE HYDROCHLORIDE AND CODEINE PHOSPHATE 6.25; 1 MG/5ML; MG/5ML
5 SYRUP ORAL EVERY 4 HOURS PRN
Qty: 118 ML | Refills: 0 | Status: SHIPPED | OUTPATIENT
Start: 2022-10-21 | End: 2022-12-09

## 2022-10-21 RX ORDER — SUMATRIPTAN 100 MG/1
TABLET, FILM COATED ORAL
Qty: 810 TABLET | Refills: 3 | Status: SHIPPED | OUTPATIENT
Start: 2022-10-21 | End: 2023-03-09 | Stop reason: SDUPTHER

## 2022-10-21 RX ORDER — METOPROLOL TARTRATE 75 MG/1
1 TABLET, FILM COATED ORAL DAILY
COMMUNITY
End: 2022-10-21

## 2022-10-21 RX ORDER — METOPROLOL SUCCINATE 25 MG/1
75 TABLET, EXTENDED RELEASE ORAL DAILY
Qty: 270 TABLET | Refills: 2 | Status: SHIPPED | OUTPATIENT
Start: 2022-10-21 | End: 2023-01-04 | Stop reason: SDUPTHER

## 2022-10-21 RX ORDER — ALPRAZOLAM 0.5 MG/1
0.5 TABLET ORAL NIGHTLY PRN
Qty: 30 TABLET | Refills: 1 | Status: SHIPPED | OUTPATIENT
Start: 2022-10-21 | End: 2023-03-09 | Stop reason: SDUPTHER

## 2022-10-21 RX ORDER — ESOMEPRAZOLE MAGNESIUM 40 MG/1
40 CAPSULE, DELAYED RELEASE ORAL
Qty: 90 CAPSULE | Refills: 3 | Status: SHIPPED | OUTPATIENT
Start: 2022-10-21 | End: 2023-03-31 | Stop reason: SDUPTHER

## 2022-10-21 NOTE — PROGRESS NOTES
"  Hay Serna M.D.  Internal Medicine  [facility]  [address], [address]  [place] [address]  [phone]      Chief Complaint  Establish Care and Med Refill    SUBJECTIVE    History of Present Illness    Sirisha Auguste is a 87 y.o. female who presents to the office today as a new patient to establish care.     She is here with her daughter today.    She was in the hospital in July for e coli. She had initially improved with antibiotics but now is having increased symtoms.  Gasstroenterology is planning for flex sig and colonoscopy. Her daughter is wondering about nutrition. She only had colonoscopy up to 20 cm but there was risk of perforation so it was stopped. Her daughter is concerned she needs infectious workup for diarrhea for recurrence of symptoms. She is having loose stools still. Stool comes out in \"long wad.\" No fevers. She has hemorrhoids and had blood in stool this AM.      In July hospitalization she had A. fib that had cardioverted spontaneously with IV Lopressor.  Her Toprol was increased.  Blood pressure remained under good control.  Troponin was negative.  2D echo revealed a normal ejection fraction septal hypertrophy.  Anticoagulation held at this time as the patient had active colitis and is a high risk of bleed. She follows with cardiology.     Previously quite active but has been less active since hospializtion    She is short of breath for past year so she has pulmonology and cardiology appointments. She gets short of breath walking around the house. She feels exhausted. No chest pain or pressure. No wheezing. She says her nose runs all the time and she can't clear her throat. She has chronic cough    She has some burning with urination today. She took AZO for this at home.     She has history of Meniere's disease    She takes tylenol for osteoarthritis    She has anxiety and take xanax nightly.     Review of Systems    Allergies   Allergen Reactions   • " Atorvastatin Myalgia   • Sulfa Antibiotics Other (See Comments)     unknown        Outpatient Medications Marked as Taking for the 10/21/22 encounter (Office Visit) with Hay Serna MD   Medication Sig Dispense Refill   • ALPRAZolam (XANAX) 0.5 MG tablet Take 1 tablet by mouth At Night As Needed for Anxiety. 30 tablet 1   • diclofenac (VOLTAREN) 1 % gel gel Apply 4 g topically 4 (Four) Times a Day As Needed.     • esomeprazole (nexIUM) 40 MG capsule Take 1 capsule by mouth Every Morning Before Breakfast. 90 capsule 3   • estradiol (ESTRACE) 0.1 MG/GM vaginal cream Insert 2 g into the vagina Daily.     • ezetimibe-simvastatin (VYTORIN) 10-20 MG per tablet TAKE 1 TABLET EVERY NIGHT 90 tablet 3   • meclizine (ANTIVERT) 25 MG tablet Take 1 tablet by mouth 3 (Three) Times a Day As Needed for dizziness. 180 tablet 1   • montelukast (SINGULAIR) 10 MG tablet TAKE 1 TABLET EVERY NIGHT 90 tablet 3   • Multiple Minerals-Vitamins (ADVANCED CALCIUM/D/MAGNESIUM) tablet Take  by mouth. not taking     • Multiple Vitamins-Minerals (MULTIVITAMIN & MINERAL PO) Take 1 tablet by mouth Daily.     • Potassium Gluconate 550 MG tablet Take 1 tablet by mouth Daily.     • Probiotic Product (PROBIOTIC PO) Take  by mouth Every Other Day.     • SUMAtriptan (IMITREX) 100 MG tablet TAKE 1 TABLET AT ONSET OF HEADACHE. MAY REPEAT DOSE ONE TIME IN 2 HOURS IF HEADACHE NOT RELIEVED 810 tablet 3   • VITAMIN D, CHOLECALCIFEROL, PO Take 2,000 Units by mouth Daily.     • [DISCONTINUED] ALPRAZolam (XANAX) 0.5 MG tablet TAKE 1 TABLET AT NIGHT AS NEEDED FOR ANXIETY 90 tablet 1   • [DISCONTINUED] esomeprazole (nexIUM) 40 MG capsule TAKE 1 CAPSULE EVERY MORNING BEFORE BREAKFAST 90 capsule 3   • [DISCONTINUED] Metoprolol Tartrate 75 MG tablet Take 1 tablet by mouth Daily.     • [DISCONTINUED] SUMAtriptan (IMITREX) 100 MG tablet TAKE 1 TABLET AT ONSET OF HEADACHE. MAY REPEAT DOSE ONE TIME IN 2 HOURS IF HEADACHE NOT RELIEVED 810 tablet 3        Past Medical  "History:   Diagnosis Date   • Anxiety 1985+/-   • Arthritis    • Breast cyst    • GERD (gastroesophageal reflux disease)    • Hyperglycemia    • Hyperlipidemia    • Hypertension    • Low back pain    • Migraines    • Murmur    • Osteopenia    • RBBB    • Urinary tract infection     many times over years     Past Surgical History:   Procedure Laterality Date   • BLADDER SURGERY     • BREAST CYST ASPIRATION     • COLONOSCOPY N/A 7/14/2022    Procedure: COLONOSCOPY TO 20CM WITH COLD BIOPSIES;  Surgeon: Henrik Gaston MD;  Location: [facility] ENDOSCOPY;  Service: Gastroenterology;  Laterality: N/A;  PRE- ABNORMAL CT, BLACK TARRY STOOL, ABDOMINAL PAIN  POST- COLITIS   • ENDOSCOPY N/A 7/12/2022    Procedure: ESOPHAGOGASTRODUODENOSCOPY WITH COLD BIOPSIES;  Surgeon: Yoselyn Gates MD;  Location: [facility] ENDOSCOPY;  Service: Gastroenterology;  Laterality: N/A;  PRE- DYSPEPSIA, MELENA  POST- MILD GASTRITIS, SMALL HIATAL HERNIA   • HYSTERECTOMY     • OOPHORECTOMY       Family History   Problem Relation Age of Onset   • Cancer Sister         breast   • Breast cancer Sister    • Cancer Maternal Aunt         breast   • Breast cancer Maternal Aunt    • Anxiety disorder Mother         no panic attacks known   • Arthritis Mother         very bad case    reports that she quit smoking about 62 years ago. Her smoking use included cigarettes. She started smoking about 62 years ago. She has a 0.13 pack-year smoking history. She has never used smokeless tobacco. She reports that she does not drink alcohol and does not use drugs.    OBJECTIVE    Vital Signs:   /64   Pulse 63   Temp 97.3 °F (36.3 °C)   Ht 162.6 cm (64.02\")   Wt 57.8 kg (127 lb 6.4 oz)   SpO2 94%   BMI 21.86 kg/m²     Physical Exam  Constitutional:       Appearance: Normal appearance. She is normal weight.   Cardiovascular:      Rate and Rhythm: Normal rate and regular rhythm.      Heart sounds: Normal heart sounds.      Comments: Holosystolic murmur  Pulmonary: "      Effort: Pulmonary effort is normal.      Breath sounds: Normal breath sounds.   Abdominal:      General: Abdomen is flat. There is no distension.      Palpations: Abdomen is soft.      Tenderness: There is no abdominal tenderness.   Skin:     General: Skin is warm and dry.   Neurological:      Mental Status: She is alert.   Psychiatric:         Mood and Affect: Mood normal.         Behavior: Behavior normal.         Thought Content: Thought content normal.            The following data was reviewed by: Hay Serna MD on 10/21/2022:  CMP    CMP 7/13/22 7/14/22 7/15/22   Glucose 104 (A) 96 101 (A)   BUN 7 (A) 5 (A) 8   Creatinine 0.69 0.73 0.71   Sodium 129 (A) 132 (A) 135 (A)   Potassium 3.5 4.1 3.6   Chloride 96 (A) 99 101   Calcium 9.3 9.6 9.6   (A) Abnormal value       Comments are available for some flowsheets but are not being displayed.           CBC w/diff    CBC w/Diff 7/13/22 7/14/22 7/15/22   WBC 9.72 4.63 4.37   RBC 4.06 4.01 4.06   Hemoglobin 12.5 12.1 12.2   Hematocrit 36.1 35.6 36.7   MCV 88.9 88.8 90.4   MCH 30.8 30.2 30.0   MCHC 34.6 34.0 33.2   RDW 12.0 (A) 12.4 12.2 (A)   Platelets 196 194 204   Neutrophil Rel % 75.5 71.1 65.5   Immature Granulocyte Rel % 0.5 0.2 0.2   Lymphocyte Rel % 14.6 (A) 16.6 (A) 19.2 (A)   Monocyte Rel % 7.9 9.1 10.3   Eosinophil Rel % 1.3 2.6 4.3   Basophil Rel % 0.2 0.4 0.5   (A) Abnormal value                TSH    TSH 7/13/22   TSH 1.310               Data reviewed: Recent hospitalization notes From July       CT 7/10/2022  IMPRESSION:  1. There are persistent and slightly progressive findings suggestive of  focal colitis of the proximal sigmoid colon.  2. There is persistent and mildly progressive moderate retention of  stool throughout the colon.     July Echo  • Left ventricular systolic function is hyperdynamic (EF > 70%).  • There is severe asymmetric basal septal hypertrophy with protrusion into the LVOT. Otherwise, there is mild concentric left ventricular  hypertrophy  • Left ventricular outflow tract peak flow gradient at rest is 9 mmHg. Left ventricular outflow tract peak gradient with valsalva is 28 mmHg.  • Left ventricular diastolic function is consistent with (grade Ia w/high LAP) impaired relaxation.  • Normal right ventricular cavity size and systolic function noted.  • The left atrial cavity is severely dilated.  • There is severe nodular calcification of the posterior mitral annulus with extension onto the posterior leaflet  • Mild to moderate mitral valve regurgitation is present. No significant mitral valve stenosis is present.  • Moderate tricuspid valve regurgitation is present.  • Calculated right ventricular systolic pressure from tricuspid regurgitation is 40 mmHg.  • There is a trivial pericardial effusion      ASSESSMENT & PLAN     86 yo w/ paroxysmal a fib, recent colitis, HTN, HLD here to establish care. She has follow up appointments for hospital issues with GI and cardiology. She has cardiology and pulm appointments for dyspnea on exertion. Recent echo showed normal EF severely dilated LA, mild-moderate MR and moderate TR. Her daughter is concerned she may have recurrence of infectious colitis so stool studies were ordered today. Repeat colonoscopy is coming up with GI. She needs refills today.    Diagnoses and all orders for this visit:    1. Colitis (Primary)  -     Calprotectin, Fecal - Stool, Per Rectum  -     Fecal Lactoferrin Qual. - Stool, Per Rectum  -     Clostridioides difficile EIA - Stool, Per Rectum  -     Stool Culture (Reference Lab) - Stool, Per Rectum  -     Ova & Parasite Examination - Stool, Per Rectum    2. Chronic migraine without aura without status migrainosus, not intractable  -     SUMAtriptan (IMITREX) 100 MG tablet; TAKE 1 TABLET AT ONSET OF HEADACHE. MAY REPEAT DOSE ONE TIME IN 2 HOURS IF HEADACHE NOT RELIEVED  Dispense: 810 tablet; Refill: 3    3. Anxiety  -     ALPRAZolam (XANAX) 0.5 MG tablet; Take 1 tablet by  mouth At Night As Needed for Anxiety.  Dispense: 30 tablet; Refill: 1    4. Chronic cough  -     promethazine-codeine (PHENERGAN with CODEINE) 6.25-10 MG/5ML syrup; Take 5 mL by mouth Every 4 (Four) Hours As Needed for Cough.  Dispense: 118 mL; Refill: 0    5. Dysuria  -     Urinalysis With Culture If Indicated -    6. Chronic atrial fibrillation with rapid ventricular response (HCC)  -     metoprolol succinate XL (Toprol XL) 25 MG 24 hr tablet; Take 3 tablets by mouth Daily.  Dispense: 270 tablet; Refill: 2    7. Gastroesophageal reflux disease, unspecified whether esophagitis present  -     esomeprazole (nexIUM) 40 MG capsule; Take 1 capsule by mouth Every Morning Before Breakfast.  Dispense: 90 capsule; Refill: 3        Health Maintenance Due   Topic Date Due   • DXA SCAN  Never done   • TDAP/TD VACCINES (1 - Tdap) Never done   • ANNUAL WELLNESS VISIT  03/20/2019   • ZOSTER VACCINE (3 of 3) 07/23/2019        Follow Up  Return in about 3 months (around 1/21/2023).    Patient/family had no further questions at this time and verbalized understanding of the plan discussed today.

## 2022-10-25 LAB
APPEARANCE UR: ABNORMAL
BACTERIA #/AREA URNS HPF: ABNORMAL /HPF
BACTERIA UR CULT: ABNORMAL
BACTERIA UR CULT: ABNORMAL
BILIRUB UR QL STRIP: NEGATIVE
CASTS URNS QL MICRO: ABNORMAL /LPF
COLOR UR: YELLOW
EPI CELLS #/AREA URNS HPF: ABNORMAL /HPF (ref 0–10)
GLUCOSE UR QL STRIP: NEGATIVE
HGB UR QL STRIP: ABNORMAL
KETONES UR QL STRIP: NEGATIVE
LEUKOCYTE ESTERASE UR QL STRIP: ABNORMAL
MICRO URNS: ABNORMAL
NITRITE UR QL STRIP: POSITIVE
OTHER ANTIBIOTIC SUSC ISLT: ABNORMAL
PH UR STRIP: 6 [PH] (ref 5–7.5)
PROT UR QL STRIP: NEGATIVE
RBC #/AREA URNS HPF: ABNORMAL /HPF (ref 0–2)
SP GR UR STRIP: 1.01 (ref 1–1.03)
URINALYSIS REFLEX: ABNORMAL
UROBILINOGEN UR STRIP-MCNC: 0.2 MG/DL (ref 0.2–1)
WBC #/AREA URNS HPF: >30 /HPF (ref 0–5)

## 2022-10-25 RX ORDER — NITROFURANTOIN 25; 75 MG/1; MG/1
100 CAPSULE ORAL 2 TIMES DAILY
Qty: 10 CAPSULE | Refills: 0 | Status: SHIPPED | OUTPATIENT
Start: 2022-10-25 | End: 2022-10-30

## 2022-10-28 ENCOUNTER — TRANSCRIBE ORDERS (OUTPATIENT)
Dept: ADMINISTRATIVE | Facility: HOSPITAL | Age: 87
End: 2022-10-28

## 2022-10-28 DIAGNOSIS — J84.9 ILD (INTERSTITIAL LUNG DISEASE): Primary | ICD-10-CM

## 2022-10-28 LAB
BACTERIA SPEC CULT: NORMAL
BACTERIA SPEC CULT: NORMAL
C DIFF TOX A+B STL QL IA: NEGATIVE
CAMPYLOBACTER STL CULT: NORMAL
E COLI SXT STL QL IA: NEGATIVE
O+P SPEC MICRO: NORMAL
O+P STL CONC: NORMAL
SALM + SHIG STL CULT: NORMAL

## 2022-11-14 ENCOUNTER — TELEPHONE (OUTPATIENT)
Dept: GASTROENTEROLOGY | Facility: CLINIC | Age: 87
End: 2022-11-14

## 2022-11-18 ENCOUNTER — TELEPHONE (OUTPATIENT)
Dept: GASTROENTEROLOGY | Facility: CLINIC | Age: 87
End: 2022-11-18

## 2022-12-08 DIAGNOSIS — R05.3 CHRONIC COUGH: ICD-10-CM

## 2022-12-09 RX ORDER — PROMETHAZINE HYDROCHLORIDE AND CODEINE PHOSPHATE 6.25; 1 MG/5ML; MG/5ML
SOLUTION ORAL
Qty: 90 ML | Refills: 0 | Status: SHIPPED | OUTPATIENT
Start: 2022-12-09

## 2023-01-03 ENCOUNTER — TELEPHONE (OUTPATIENT)
Dept: INTERNAL MEDICINE | Facility: CLINIC | Age: 88
End: 2023-01-03

## 2023-01-03 NOTE — TELEPHONE ENCOUNTER
Caller: Sirisha Auguste    Relationship: Self    Best call back number: [phone]    Who are you requesting to speak with (clinical staff, provider,  specific staff member): CLINICAL STAFF    What was the call regarding: STATES NOT FEELING WELL, FLUSH AND WARM TO TOUCH, HAVING STOMACH PAIN PLEASE CALL AND ADVISE     Do you require a callback: YES

## 2023-01-04 ENCOUNTER — OFFICE VISIT (OUTPATIENT)
Dept: INTERNAL MEDICINE | Facility: CLINIC | Age: 88
End: 2023-01-04
Payer: MEDICARE

## 2023-01-04 VITALS
SYSTOLIC BLOOD PRESSURE: 126 MMHG | BODY MASS INDEX: 21.34 KG/M2 | HEART RATE: 69 BPM | TEMPERATURE: 97.7 F | WEIGHT: 125 LBS | HEIGHT: 64 IN | DIASTOLIC BLOOD PRESSURE: 86 MMHG

## 2023-01-04 DIAGNOSIS — N30.01 ACUTE CYSTITIS WITH HEMATURIA: ICD-10-CM

## 2023-01-04 DIAGNOSIS — R35.0 URINE FREQUENCY: Primary | ICD-10-CM

## 2023-01-04 DIAGNOSIS — I10 PRIMARY HYPERTENSION: ICD-10-CM

## 2023-01-04 DIAGNOSIS — I48.20 CHRONIC ATRIAL FIBRILLATION WITH RAPID VENTRICULAR RESPONSE: ICD-10-CM

## 2023-01-04 DIAGNOSIS — R19.7 DIARRHEA, UNSPECIFIED TYPE: ICD-10-CM

## 2023-01-04 LAB
BILIRUB BLD-MCNC: NEGATIVE MG/DL
CLARITY, POC: ABNORMAL
COLOR UR: ABNORMAL
EXPIRATION DATE: ABNORMAL
GLUCOSE UR STRIP-MCNC: NEGATIVE MG/DL
KETONES UR QL: NEGATIVE
LEUKOCYTE EST, POC: ABNORMAL
Lab: ABNORMAL
NITRITE UR-MCNC: POSITIVE MG/ML
PH UR: 5.5 [PH] (ref 5–8)
PROT UR STRIP-MCNC: NEGATIVE MG/DL
RBC # UR STRIP: ABNORMAL /UL
SP GR UR: 1.02 (ref 1–1.03)
UROBILINOGEN UR QL: ABNORMAL

## 2023-01-04 PROCEDURE — 99213 OFFICE O/P EST LOW 20 MIN: CPT | Performed by: STUDENT IN AN ORGANIZED HEALTH CARE EDUCATION/TRAINING PROGRAM

## 2023-01-04 PROCEDURE — 81003 URINALYSIS AUTO W/O SCOPE: CPT | Performed by: STUDENT IN AN ORGANIZED HEALTH CARE EDUCATION/TRAINING PROGRAM

## 2023-01-04 RX ORDER — SACCHAROMYCES BOULARDII 250 MG
250 CAPSULE ORAL 2 TIMES DAILY
Qty: 30 CAPSULE | Refills: 0 | Status: SHIPPED | OUTPATIENT
Start: 2023-01-04 | End: 2023-03-10

## 2023-01-04 RX ORDER — NITROFURANTOIN 25; 75 MG/1; MG/1
100 CAPSULE ORAL 2 TIMES DAILY
Qty: 10 CAPSULE | Refills: 0 | Status: SHIPPED | OUTPATIENT
Start: 2023-01-04 | End: 2023-01-09

## 2023-01-04 RX ORDER — METOPROLOL SUCCINATE 25 MG/1
50 TABLET, EXTENDED RELEASE ORAL DAILY
Qty: 60 TABLET | Refills: 2 | Status: SHIPPED | OUTPATIENT
Start: 2023-01-04 | End: 2023-01-26 | Stop reason: SDUPTHER

## 2023-01-04 NOTE — PATIENT INSTRUCTIONS
Can take immodium for diarrhea. Initial: 4 mg, followed by 2 mg after each loose stool    Cont tylenol and ibuprofen for back pain.    Schedule CT and sigmoidoscopy with GI.

## 2023-01-04 NOTE — PROGRESS NOTES
Hay Serna M.D.  Internal Medicine  [facility]  [address], [address]  [place] [address]  [phone]      Chief Complaint  Abdominal Pain and Hip Pain (Right/) for 2 weeks.     SUBJECTIVE    History of Present Illness    Sirisha Auguste is a 87 y.o. female who presents to the office today as an established patient that last saw me on 10/21/2022.  She is here with her caregiver today.    She is here today for abdominal pain.  At her last visit patient and her daughter were concerned about patient having diarrhea described as stool comes out in \"long wad\".  She had been hospitalized July 2022 for colitis.    Last Tuesday she was having diarrhea and could not make it to the restroom in time. She is afraid to eat because she thinks this will cause diarrhea. Her stool was mushy. Stool was dark. Denies blood in stool. She has a large hemorrhoid so she has to digitally remove her tool. States bowel movements are no longer diarrhea. Has bowel movement everytime she urinates. Sometimes she feels urge to have a bowel movement and then she has one later.     Her daughter cooks for her. Patient was told by GI to increase fiber but she has not done this. She enjoys \"[employer]\".    She has arthritis and stated right hip and right lower abdomen is bothering her. She feels bloated. Weight is stable since July but patient states she lost weight in the hospital.     She was seen by cardiology in November for paroxysmal A. fib and shortness of breath.  She was started on Lasix 20 mg daily with potassium 10 mill equivalents daily.  Her Toprol was decreased to 50 mg a day.  Because she was in sinus rhythm patient was not started on anticoagulation.    She feels she has a UTI. She is having a fever per her caregiver. She feels increased frequency and pain with urination.     [facility] called to schedule CT and sigmoidoscopy but patient did not know what it was for so she did not schedule  it.     Dyslipidemia-on Vytorin      Review of Systems    Allergies   Allergen Reactions   • Atorvastatin Myalgia   • Sulfa Antibiotics Other (See Comments)     unknown        Outpatient Medications Marked as Taking for the 1/4/23 encounter (Office Visit) with Hay Serna MD   Medication Sig Dispense Refill   • ALPRAZolam (XANAX) 0.5 MG tablet Take 1 tablet by mouth At Night As Needed for Anxiety. 30 tablet 1   • diclofenac (VOLTAREN) 1 % gel gel Apply 4 g topically 4 (Four) Times a Day As Needed.     • esomeprazole (nexIUM) 40 MG capsule Take 1 capsule by mouth Every Morning Before Breakfast. 90 capsule 3   • estradiol (ESTRACE) 0.1 MG/GM vaginal cream Insert 2 g into the vagina Daily.     • ezetimibe-simvastatin (VYTORIN) 10-20 MG per tablet TAKE 1 TABLET EVERY NIGHT 90 tablet 3   • metoprolol succinate XL (Toprol XL) 25 MG 24 hr tablet Take 2 tablets by mouth Daily. 60 tablet 2   • Multiple Minerals-Vitamins (ADVANCED CALCIUM/D/MAGNESIUM) tablet Take  by mouth. not taking     • Multiple Vitamins-Minerals (MULTIVITAMIN & MINERAL PO) Take 1 tablet by mouth Daily.     • Probiotic Product (PROBIOTIC PO) Take  by mouth Every Other Day.     • SUMAtriptan (IMITREX) 100 MG tablet TAKE 1 TABLET AT ONSET OF HEADACHE. MAY REPEAT DOSE ONE TIME IN 2 HOURS IF HEADACHE NOT RELIEVED 810 tablet 3   • VITAMIN D, CHOLECALCIFEROL, PO Take 2,000 Units by mouth Daily.     • [DISCONTINUED] metoprolol succinate XL (Toprol XL) 25 MG 24 hr tablet Take 3 tablets by mouth Daily. 270 tablet 2        Past Medical History:   Diagnosis Date   • Anxiety 1985+/-   • Arthritis    • Breast cyst    • GERD (gastroesophageal reflux disease)    • Hyperglycemia    • Hyperlipidemia    • Hypertension    • Low back pain    • Migraines    • Murmur    • Osteopenia    • RBBB    • Urinary tract infection     many times over years     Past Surgical History:   Procedure Laterality Date   • BLADDER SURGERY     • BREAST CYST ASPIRATION     • COLONOSCOPY N/A  7/14/2022    Procedure: COLONOSCOPY TO 20CM WITH COLD BIOPSIES;  Surgeon: Henrik Gaston MD;  Location: [facility] ENDOSCOPY;  Service: Gastroenterology;  Laterality: N/A;  PRE- ABNORMAL CT, BLACK TARRY STOOL, ABDOMINAL PAIN  POST- COLITIS   • ENDOSCOPY N/A 7/12/2022    Procedure: ESOPHAGOGASTRODUODENOSCOPY WITH COLD BIOPSIES;  Surgeon: Yoselyn Gates MD;  Location: [facility] ENDOSCOPY;  Service: Gastroenterology;  Laterality: N/A;  PRE- DYSPEPSIA, MELENA  POST- MILD GASTRITIS, SMALL HIATAL HERNIA   • HYSTERECTOMY     • OOPHORECTOMY       Family History   Problem Relation Age of Onset   • Cancer Sister         breast   • Breast cancer Sister    • Cancer Maternal Aunt         breast   • Breast cancer Maternal Aunt    • Anxiety disorder Mother         no panic attacks known   • Arthritis Mother         very bad case    reports that she quit smoking about 62 years ago. Her smoking use included cigarettes. She started smoking about 63 years ago. She has a 0.13 pack-year smoking history. She has never used smokeless tobacco. She reports that she does not drink alcohol and does not use drugs.    OBJECTIVE    Vital Signs:   /86   Pulse 69   Temp 97.7 °F (36.5 °C)   Ht 162.6 cm (64\")   Wt 56.7 kg (125 lb)   BMI 21.46 kg/m²     Physical Exam  Constitutional:       Appearance: Normal appearance. She is normal weight.   Cardiovascular:      Rate and Rhythm: Normal rate and regular rhythm.      Heart sounds: Normal heart sounds. No murmur heard.  Pulmonary:      Effort: Pulmonary effort is normal.      Breath sounds: Normal breath sounds.   Abdominal:      General: Abdomen is flat. There is no distension.      Palpations: Abdomen is soft.      Tenderness: There is no abdominal tenderness.   Musculoskeletal:      Right lower leg: No edema.      Left lower leg: No edema.   Skin:     General: Skin is warm and dry.   Neurological:      Mental Status: She is alert.   Psychiatric:         Mood and Affect: Mood normal.          Behavior: Behavior normal.         Thought Content: Thought content normal.            The following data was reviewed by: Hay Serna MD on 01/04/2023:  CMP    CMP 7/13/22 7/14/22 7/15/22   Glucose 104 (A) 96 101 (A)   BUN 7 (A) 5 (A) 8   Creatinine 0.69 0.73 0.71   eGFR 84.6 80.2 82.9   Sodium 129 (A) 132 (A) 135 (A)   Potassium 3.5 4.1 3.6   Chloride 96 (A) 99 101   Calcium 9.3 9.6 9.6   BUN/Creatinine Ratio 10.1 6.8 (A) 11.3   Anion Gap 12.6 11.0 11.0   (A) Abnormal value       Comments are available for some flowsheets but are not being displayed.           CBC w/diff    CBC w/Diff 7/13/22 7/14/22 7/15/22   WBC 9.72 4.63 4.37   RBC 4.06 4.01 4.06   Hemoglobin 12.5 12.1 12.2   Hematocrit 36.1 35.6 36.7   MCV 88.9 88.8 90.4   MCH 30.8 30.2 30.0   MCHC 34.6 34.0 33.2   RDW 12.0 (A) 12.4 12.2 (A)   Platelets 196 194 204   Neutrophil Rel % 75.5 71.1 65.5   Immature Granulocyte Rel % 0.5 0.2 0.2   Lymphocyte Rel % 14.6 (A) 16.6 (A) 19.2 (A)   Monocyte Rel % 7.9 9.1 10.3   Eosinophil Rel % 1.3 2.6 4.3   Basophil Rel % 0.2 0.4 0.5   (A) Abnormal value                TSH    TSH 7/13/22   TSH 1.310               Data reviewed: Note from last visit.             ASSESSMENT & PLAN     Diagnoses and all orders for this visit:    1. Urine frequency (Primary)  -     POC Urinalysis Dipstick, Automated  -     Urine Culture - Urine, Urine, Clean Catch; Future  -     Urine Culture - Urine, Urine, Clean Catch    2. Chronic atrial fibrillation with rapid ventricular response (HCC)  -     metoprolol succinate XL (Toprol XL) 25 MG 24 hr tablet; Take 2 tablets by mouth Daily.  Dispense: 60 tablet; Refill: 2    3. Primary hypertension  -     Cancel: Urinalysis With Culture If Indicated -    4. Diarrhea, unspecified type  -     saccharomyces boulardii (Florastor) 250 MG capsule; Take 1 capsule by mouth 2 (Two) Times a Day.  Dispense: 30 capsule; Refill: 0    5. Acute cystitis with hematuria  -     nitrofurantoin,  macrocrystal-monohydrate, (Macrobid) 100 MG capsule; Take 1 capsule by mouth 2 (Two) Times a Day for 5 days.  Dispense: 10 capsule; Refill: 0    Discussed the importance of her completing her GI work-up to find the cause of her diarrhea.  She has an appointment coming up with them and her caregiver will help her make the appointment for this CT and sigmoidoscopy.  Blood pressure under good control today on 50 mg of metoprolol.  We will treat UTI with Macrobid.  Patient is very concerned about recurrent GI infections.  We will add probiotic as well.  Discussed that if she is having diarrhea in future she could try to take Imodium so that she can avoid stool incontinence.        Health Maintenance Due   Topic Date Due   • DXA SCAN  Never done   • TDAP/TD VACCINES (1 - Tdap) Never done   • ANNUAL WELLNESS VISIT  03/20/2019   • ZOSTER VACCINE (3 of 3) 07/23/2019   • MAMMOGRAM  01/04/2023        Follow Up  No follow-ups on file.    Patient/family had no further questions at this time and verbalized understanding of the plan discussed today.

## 2023-01-05 ENCOUNTER — TELEPHONE (OUTPATIENT)
Dept: GASTROENTEROLOGY | Facility: CLINIC | Age: 88
End: 2023-01-05
Payer: MEDICARE

## 2023-01-05 NOTE — TELEPHONE ENCOUNTER
----- Message from Sirisha Auguste sent at 1/4/2023  3:38 PM EST -----  Regarding: CT Abdomen, flex sigmoidoscopy  Contact: [phone]  I HAD been fine. I couldn't get the right people at central scheduling to schedule both CT Abdomen, flex sigmoidoscopy on the same day. I gave up.  Ashanti had this trouble again. Could you schedule these for me for the same day please? My follow up with you may have to be delayed depending on testing. Thank you very much.

## 2023-01-06 NOTE — TELEPHONE ENCOUNTER
Call to daughterSowmya.  Advise per Iris Grimaldo's notes.  Verb understanding.  Will call [facility] to arrange CT.

## 2023-01-09 LAB
BACTERIA UR CULT: ABNORMAL
BACTERIA UR CULT: ABNORMAL
OTHER ANTIBIOTIC SUSC ISLT: ABNORMAL

## 2023-01-26 ENCOUNTER — OFFICE VISIT (OUTPATIENT)
Dept: INTERNAL MEDICINE | Facility: CLINIC | Age: 88
End: 2023-01-26
Payer: MEDICARE

## 2023-01-26 VITALS
WEIGHT: 128.8 LBS | DIASTOLIC BLOOD PRESSURE: 78 MMHG | HEIGHT: 64 IN | HEART RATE: 72 BPM | OXYGEN SATURATION: 98 % | BODY MASS INDEX: 21.99 KG/M2 | TEMPERATURE: 98.2 F | SYSTOLIC BLOOD PRESSURE: 120 MMHG

## 2023-01-26 DIAGNOSIS — Z00.00 ANNUAL PHYSICAL EXAM: Primary | ICD-10-CM

## 2023-01-26 DIAGNOSIS — R15.9 INCONTINENCE OF FECES WITH FECAL URGENCY: ICD-10-CM

## 2023-01-26 DIAGNOSIS — M85.80 OSTEOPENIA, UNSPECIFIED LOCATION: ICD-10-CM

## 2023-01-26 DIAGNOSIS — R15.2 INCONTINENCE OF FECES WITH FECAL URGENCY: ICD-10-CM

## 2023-01-26 DIAGNOSIS — Z78.0 POST-MENOPAUSAL: ICD-10-CM

## 2023-01-26 DIAGNOSIS — I48.20 CHRONIC ATRIAL FIBRILLATION WITH RAPID VENTRICULAR RESPONSE: ICD-10-CM

## 2023-01-26 PROCEDURE — 99213 OFFICE O/P EST LOW 20 MIN: CPT | Performed by: STUDENT IN AN ORGANIZED HEALTH CARE EDUCATION/TRAINING PROGRAM

## 2023-01-26 RX ORDER — METOPROLOL SUCCINATE 50 MG/1
50 TABLET, EXTENDED RELEASE ORAL DAILY
Qty: 90 TABLET | Refills: 2 | Status: SHIPPED | OUTPATIENT
Start: 2023-01-26

## 2023-01-26 NOTE — PROGRESS NOTES
Hay Serna M.D.  Internal Medicine  [facility]  [address], [address]  [place] [address]  [phone]      Chief Complaint  Follow-up (3 mth F/U - patient would like to discuss labs. /) and Medication Problem (Asking about the metoprolol should she be taking 50 mg she was taking 75 and now she's on two 25 mg tablets. /)    SUBJECTIVE    History of Present Illness    Sirisha Auguste is a 87 y.o. female who presents to the office today as an established patient that last saw me on 1/4/2023.     She is here with her friend today.    Patient has a history of hospitalization for E. coli infection in July 2022.  She was to get abdominal CT and sigmoidoscopy because this could not be completed in the hospital but it was not scheduled yet at her last appointment.  She is unsure if she wants sigmoidscopy because of the prep. She has not scheduled CT yet. At her last appointment she was treated for a UTI and started on probiotic.  She reports improvement in her UTI symptoms.    Had an episode of diarrhea last week with large volume black stools. Stools in between are brown but she has to manually disimpact herself.     She declines further breast cancer screening with mammogram.  She would like to think about getting a DEXA scan.    Her cardiologist decreased her metoprolol to 50 mg. She prefers to take just one pill so she is asking to switch to 150 mg pill rather than two 25 mg.     Review of Systems    Allergies   Allergen Reactions   • Atorvastatin Myalgia   • Sulfa Antibiotics Other (See Comments)     unknown        Outpatient Medications Marked as Taking for the 1/26/23 encounter (Office Visit) with Hay Serna MD   Medication Sig Dispense Refill   • ALPRAZolam (XANAX) 0.5 MG tablet Take 1 tablet by mouth At Night As Needed for Anxiety. 30 tablet 1   • diclofenac (VOLTAREN) 1 % gel gel Apply 4 g topically 4 (Four) Times a Day As Needed.     • esomeprazole (nexIUM) 40 MG capsule Take 1  capsule by mouth Every Morning Before Breakfast. 90 capsule 3   • estradiol (ESTRACE) 0.1 MG/GM vaginal cream Insert 2 g into the vagina Daily.     • ezetimibe-simvastatin (VYTORIN) 10-20 MG per tablet TAKE 1 TABLET EVERY NIGHT 90 tablet 3   • meclizine (ANTIVERT) 25 MG tablet Take 1 tablet by mouth 3 (Three) Times a Day As Needed for dizziness. 180 tablet 1   • metoprolol succinate XL (TOPROL-XL) 50 MG 24 hr tablet Take 1 tablet by mouth Daily. 90 tablet 2   • montelukast (SINGULAIR) 10 MG tablet TAKE 1 TABLET EVERY NIGHT 90 tablet 3   • Multiple Minerals-Vitamins (ADVANCED CALCIUM/D/MAGNESIUM) tablet Take  by mouth. not taking     • Multiple Vitamins-Minerals (MULTIVITAMIN & MINERAL PO) Take 1 tablet by mouth Daily.     • Potassium Gluconate 550 MG tablet Take 1 tablet by mouth Daily.     • Probiotic Product (PROBIOTIC PO) Take  by mouth Every Other Day.     • promethazine-codeine (PHENERGAN with CODEINE) 6.25-10 MG/5ML solution TAKE 5 ML (1 TEASPOONFUL) EVERY 4 HOURS AS NEEDED FOR COUGH 90 mL 0   • saccharomyces boulardii (Florastor) 250 MG capsule Take 1 capsule by mouth 2 (Two) Times a Day. 30 capsule 0   • SUMAtriptan (IMITREX) 100 MG tablet TAKE 1 TABLET AT ONSET OF HEADACHE. MAY REPEAT DOSE ONE TIME IN 2 HOURS IF HEADACHE NOT RELIEVED 810 tablet 3   • VITAMIN D, CHOLECALCIFEROL, PO Take 2,000 Units by mouth Daily.     • [DISCONTINUED] metoprolol succinate XL (Toprol XL) 25 MG 24 hr tablet Take 2 tablets by mouth Daily. 60 tablet 2        Past Medical History:   Diagnosis Date   • Anxiety 1985+/-   • Arthritis    • Breast cyst    • GERD (gastroesophageal reflux disease)    • Hyperglycemia    • Hyperlipidemia    • Hypertension    • Low back pain    • Migraines    • Murmur    • Osteopenia    • RBBB    • Urinary tract infection     many times over years     Past Surgical History:   Procedure Laterality Date   • BLADDER SURGERY     • BREAST CYST ASPIRATION     • COLONOSCOPY N/A 7/14/2022    Procedure: COLONOSCOPY  "TO 20CM WITH COLD BIOPSIES;  Surgeon: Henrik Gaston MD;  Location: [facility] ENDOSCOPY;  Service: Gastroenterology;  Laterality: N/A;  PRE- ABNORMAL CT, BLACK TARRY STOOL, ABDOMINAL PAIN  POST- COLITIS   • ENDOSCOPY N/A 7/12/2022    Procedure: ESOPHAGOGASTRODUODENOSCOPY WITH COLD BIOPSIES;  Surgeon: Yoselyn Gates MD;  Location: [facility] ENDOSCOPY;  Service: Gastroenterology;  Laterality: N/A;  PRE- DYSPEPSIA, MELENA  POST- MILD GASTRITIS, SMALL HIATAL HERNIA   • HYSTERECTOMY     • OOPHORECTOMY       Family History   Problem Relation Age of Onset   • Cancer Sister         breast   • Breast cancer Sister    • Cancer Maternal Aunt         breast   • Breast cancer Maternal Aunt    • Anxiety disorder Mother         no panic attacks known   • Arthritis Mother         very bad case    reports that she quit smoking about 62 years ago. Her smoking use included cigarettes. She started smoking about 63 years ago. She has a 0.13 pack-year smoking history. She has never used smokeless tobacco. She reports that she does not drink alcohol and does not use drugs.    OBJECTIVE    Vital Signs:   /78   Pulse 72   Temp 98.2 °F (36.8 °C)   Ht 162.6 cm (64.02\")   Wt 58.4 kg (128 lb 12.8 oz)   SpO2 98%   BMI 22.10 kg/m²     Physical Exam  Constitutional:       Appearance: Normal appearance. She is normal weight.   HENT:      Right Ear: Tympanic membrane normal.      Left Ear: Tympanic membrane normal.   Cardiovascular:      Rate and Rhythm: Normal rate and regular rhythm.      Heart sounds: Normal heart sounds. No murmur heard.  Pulmonary:      Effort: Pulmonary effort is normal.      Breath sounds: Normal breath sounds.   Abdominal:      General: Abdomen is flat. There is no distension.      Palpations: Abdomen is soft.      Tenderness: There is no abdominal tenderness.   Skin:     General: Skin is warm and dry.   Neurological:      Mental Status: She is alert.   Psychiatric:         Mood and Affect: Mood normal.         " Behavior: Behavior normal.         Thought Content: Thought content normal.            The following data was reviewed by: Hay Serna MD on 01/26/2023:  CMP    CMP 7/14/22 7/15/22 1/26/23   Glucose 96 101 (A) 103 (A)   BUN 5 (A) 8 18   Creatinine 0.73 0.71 0.85   eGFR 80.2 82.9    Sodium 132 (A) 135 (A) 138   Potassium 4.1 3.6 4.4   Chloride 99 101 102   Calcium 9.6 9.6 10.7 (A)   Total Protein   7.2   Albumin   4.7   Globulin   2.5   Total Bilirubin   0.8   Alkaline Phosphatase   67   AST (SGOT)   25   ALT (SGPT)   10   BUN/Creatinine Ratio 6.8 (A) 11.3 21.2   Anion Gap 11.0 11.0    (A) Abnormal value       Comments are available for some flowsheets but are not being displayed.           CBC w/diff    CBC w/Diff 7/14/22 7/15/22 1/26/23   WBC 4.63 4.37 6.06   RBC 4.01 4.06 4.33   Hemoglobin 12.1 12.2 13.2   Hematocrit 35.6 36.7 40.3   MCV 88.8 90.4 93.1   MCH 30.2 30.0 30.5   MCHC 34.0 33.2 32.8   RDW 12.4 12.2 (A) 12.3   Platelets 194 204 190   Neutrophil Rel % 71.1 65.5 60.6   Immature Granulocyte Rel % 0.2 0.2    Lymphocyte Rel % 16.6 (A) 19.2 (A) 29.7   Monocyte Rel % 9.1 10.3 7.6   Eosinophil Rel % 2.6 4.3 1.3   Basophil Rel % 0.4 0.5 0.5   (A) Abnormal value            Lipid Panel    Lipid Panel 1/26/23   Total Cholesterol 167   Triglycerides 118   HDL Cholesterol 65 (A)   VLDL Cholesterol 21   LDL Cholesterol  81   LDL/HDL Ratio 1.21   (A) Abnormal value       Comments are available for some flowsheets but are not being displayed.           TSH    TSH 7/13/22   TSH 1.310           A1C Last 3 Results    HGBA1C Last 3 Results 1/26/23   Hemoglobin A1C 5.60      Comments are available for some flowsheets but are not being displayed.           Data reviewed: Recent GI notes.             ASSESSMENT & PLAN     Diagnoses and all orders for this visit:    1. Annual physical exam (Primary)  -     CBC & Differential  -     Comprehensive Metabolic Panel  -     Lipid Panel With LDL / HDL Ratio  -     Hemoglobin A1c    2.  Chronic atrial fibrillation with rapid ventricular response (HCC)  -     metoprolol succinate XL (TOPROL-XL) 50 MG 24 hr tablet; Take 1 tablet by mouth Daily.  Dispense: 90 tablet; Refill: 2    3. Post-menopausal    4. Osteopenia, unspecified location  -     Vitamin D 25 hydroxy    5. Incontinence of feces with fecal urgency    87-year-old with hospitalization for E. coli colitis this summer here today for follow-up.  She reports occasional fecal urgency that ends in incontinence with large black stools.  Stools in between are brown but she needs to manually disimpact herself.  Discussed that this could possibly be overflow incontinence.  Black stools are concerning for GI bleed.  Reassuring hemoglobin is normal.  I encouraged her to follow-up with GI for this.  I encouraged her to schedule sigmoidoscopy.  Discussed starting MiraLAX and increasing fiber to have a soft but well formed stool daily.    Health Maintenance Due   Topic Date Due   • DXA SCAN  Never done   • TDAP/TD VACCINES (1 - Tdap) Never done   • ANNUAL WELLNESS VISIT  03/20/2019   • ZOSTER VACCINE (3 of 3) 07/23/2019        Follow Up  Return in about 3 months (around 4/26/2023) for Medicare Wellness.    Patient/family had no further questions at this time and verbalized understanding of the plan discussed today.

## 2023-01-27 LAB
25(OH)D3+25(OH)D2 SERPL-MCNC: 71.8 NG/ML (ref 30–100)
ALBUMIN SERPL-MCNC: 4.7 G/DL (ref 3.5–5.2)
ALBUMIN/GLOB SERPL: 1.9 G/DL
ALP SERPL-CCNC: 67 U/L (ref 39–117)
ALT SERPL-CCNC: 10 U/L (ref 1–33)
AST SERPL-CCNC: 25 U/L (ref 1–32)
BASOPHILS # BLD AUTO: 0.03 10*3/MM3 (ref 0–0.2)
BASOPHILS NFR BLD AUTO: 0.5 % (ref 0–1.5)
BILIRUB SERPL-MCNC: 0.8 MG/DL (ref 0–1.2)
BUN SERPL-MCNC: 18 MG/DL (ref 8–23)
BUN/CREAT SERPL: 21.2 (ref 7–25)
CALCIUM SERPL-MCNC: 10.7 MG/DL (ref 8.6–10.5)
CHLORIDE SERPL-SCNC: 102 MMOL/L (ref 98–107)
CHOLEST SERPL-MCNC: 167 MG/DL (ref 0–200)
CO2 SERPL-SCNC: 27.2 MMOL/L (ref 22–29)
CREAT SERPL-MCNC: 0.85 MG/DL (ref 0.57–1)
EGFRCR SERPLBLD CKD-EPI 2021: 66.4 ML/MIN/1.73
EOSINOPHIL # BLD AUTO: 0.08 10*3/MM3 (ref 0–0.4)
EOSINOPHIL NFR BLD AUTO: 1.3 % (ref 0.3–6.2)
ERYTHROCYTE [DISTWIDTH] IN BLOOD BY AUTOMATED COUNT: 12.3 % (ref 12.3–15.4)
GLOBULIN SER CALC-MCNC: 2.5 GM/DL
GLUCOSE SERPL-MCNC: 103 MG/DL (ref 65–99)
HBA1C MFR BLD: 5.6 % (ref 4.8–5.6)
HCT VFR BLD AUTO: 40.3 % (ref 34–46.6)
HDLC SERPL-MCNC: 65 MG/DL (ref 40–60)
HGB BLD-MCNC: 13.2 G/DL (ref 12–15.9)
IMM GRANULOCYTES # BLD AUTO: 0.02 10*3/MM3 (ref 0–0.05)
IMM GRANULOCYTES NFR BLD AUTO: 0.3 % (ref 0–0.5)
LDLC SERPL CALC-MCNC: 81 MG/DL (ref 0–100)
LDLC/HDLC SERPL: 1.21 {RATIO}
LYMPHOCYTES # BLD AUTO: 1.8 10*3/MM3 (ref 0.7–3.1)
LYMPHOCYTES NFR BLD AUTO: 29.7 % (ref 19.6–45.3)
MCH RBC QN AUTO: 30.5 PG (ref 26.6–33)
MCHC RBC AUTO-ENTMCNC: 32.8 G/DL (ref 31.5–35.7)
MCV RBC AUTO: 93.1 FL (ref 79–97)
MONOCYTES # BLD AUTO: 0.46 10*3/MM3 (ref 0.1–0.9)
MONOCYTES NFR BLD AUTO: 7.6 % (ref 5–12)
NEUTROPHILS # BLD AUTO: 3.67 10*3/MM3 (ref 1.7–7)
NEUTROPHILS NFR BLD AUTO: 60.6 % (ref 42.7–76)
NRBC BLD AUTO-RTO: 0 /100 WBC (ref 0–0.2)
PLATELET # BLD AUTO: 190 10*3/MM3 (ref 140–450)
POTASSIUM SERPL-SCNC: 4.4 MMOL/L (ref 3.5–5.2)
PROT SERPL-MCNC: 7.2 G/DL (ref 6–8.5)
RBC # BLD AUTO: 4.33 10*6/MM3 (ref 3.77–5.28)
SODIUM SERPL-SCNC: 138 MMOL/L (ref 136–145)
TRIGL SERPL-MCNC: 118 MG/DL (ref 0–150)
VLDLC SERPL CALC-MCNC: 21 MG/DL (ref 5–40)
WBC # BLD AUTO: 6.06 10*3/MM3 (ref 3.4–10.8)

## 2023-02-08 RX ORDER — EZETIMIBE AND SIMVASTATIN 10; 20 MG/1; MG/1
1 TABLET ORAL NIGHTLY
Qty: 90 TABLET | Refills: 3 | Status: SHIPPED | OUTPATIENT
Start: 2023-02-08

## 2023-02-21 DIAGNOSIS — R06.00 PAROXYSMAL DYSPNEA: ICD-10-CM

## 2023-02-22 RX ORDER — MONTELUKAST SODIUM 10 MG/1
10 TABLET ORAL NIGHTLY
Qty: 90 TABLET | Refills: 3 | Status: SHIPPED | OUTPATIENT
Start: 2023-02-22

## 2023-02-28 ENCOUNTER — TELEPHONE (OUTPATIENT)
Dept: GASTROENTEROLOGY | Facility: CLINIC | Age: 88
End: 2023-02-28

## 2023-02-28 PROBLEM — R10.30 LOWER ABDOMINAL PAIN: Status: ACTIVE | Noted: 2023-02-28

## 2023-02-28 PROBLEM — K52.9 COLITIS: Status: ACTIVE | Noted: 2023-02-28

## 2023-02-28 NOTE — TELEPHONE ENCOUNTER
Caller: Sirisha Auguste    Relationship: Self    Best call back number: [phone]    What orders are you requesting (i.e. lab or imaging): IMAGING    Where will you receive your lab/imaging services: [facility]    Additional notes: PT CALLED [facility] SCHEDUL[facility] TO SCHEDULE HER CT ABDOMEN AND FLEX SIGMOIDOSCOPY. THEY INFORMED HER THAT SHE NEEDED TO CALL THE OFFICE TO SCHEDULE THAT. NOT SURE IF THEY NEED A NEW ORDER FOR IT. CALL BACK ANYTIME OK TO [facility].

## 2023-02-28 NOTE — TELEPHONE ENCOUNTER
Called pt's daughter and advised that she can call [facility] at [phone] to arrange her ct scan . Also advised will send message to scheduling regarding flex sig. Advised someone should reach out to them to get scheduled.   Verb understanding.     Update sent to Iris LIZ.

## 2023-03-09 DIAGNOSIS — F41.9 ANXIETY: ICD-10-CM

## 2023-03-09 DIAGNOSIS — G43.709 CHRONIC MIGRAINE WITHOUT AURA WITHOUT STATUS MIGRAINOSUS, NOT INTRACTABLE: ICD-10-CM

## 2023-03-09 RX ORDER — SUMATRIPTAN 100 MG/1
TABLET, FILM COATED ORAL
Qty: 30 TABLET | Refills: 2 | Status: SHIPPED | OUTPATIENT
Start: 2023-03-09 | End: 2023-03-10 | Stop reason: SDUPTHER

## 2023-03-09 RX ORDER — ALPRAZOLAM 0.5 MG/1
0.5 TABLET ORAL NIGHTLY PRN
Qty: 30 TABLET | Refills: 0 | Status: SHIPPED | OUTPATIENT
Start: 2023-03-09 | End: 2023-03-10 | Stop reason: SDUPTHER

## 2023-03-10 ENCOUNTER — OFFICE VISIT (OUTPATIENT)
Dept: INTERNAL MEDICINE | Facility: CLINIC | Age: 88
End: 2023-03-10
Payer: MEDICARE

## 2023-03-10 VITALS
TEMPERATURE: 98.2 F | HEIGHT: 64 IN | HEART RATE: 92 BPM | OXYGEN SATURATION: 97 % | WEIGHT: 128 LBS | SYSTOLIC BLOOD PRESSURE: 128 MMHG | BODY MASS INDEX: 21.85 KG/M2 | DIASTOLIC BLOOD PRESSURE: 88 MMHG

## 2023-03-10 DIAGNOSIS — G43.709 CHRONIC MIGRAINE WITHOUT AURA WITHOUT STATUS MIGRAINOSUS, NOT INTRACTABLE: ICD-10-CM

## 2023-03-10 DIAGNOSIS — F41.9 ANXIETY: ICD-10-CM

## 2023-03-10 DIAGNOSIS — R19.7 DIARRHEA, UNSPECIFIED TYPE: ICD-10-CM

## 2023-03-10 DIAGNOSIS — R35.0 URINARY FREQUENCY: Primary | ICD-10-CM

## 2023-03-10 DIAGNOSIS — K59.00 CONSTIPATION, UNSPECIFIED CONSTIPATION TYPE: ICD-10-CM

## 2023-03-10 LAB
BILIRUB BLD-MCNC: ABNORMAL MG/DL
CLARITY, POC: CLEAR
COLOR UR: ABNORMAL
EXPIRATION DATE: ABNORMAL
GLUCOSE UR STRIP-MCNC: NEGATIVE MG/DL
KETONES UR QL: NEGATIVE
LEUKOCYTE EST, POC: ABNORMAL
Lab: ABNORMAL
NITRITE UR-MCNC: POSITIVE MG/ML
PH UR: 6 [PH] (ref 5–8)
PROT UR STRIP-MCNC: ABNORMAL MG/DL
RBC # UR STRIP: ABNORMAL /UL
SP GR UR: 1.01 (ref 1–1.03)
UROBILINOGEN UR QL: ABNORMAL

## 2023-03-10 PROCEDURE — 81003 URINALYSIS AUTO W/O SCOPE: CPT | Performed by: STUDENT IN AN ORGANIZED HEALTH CARE EDUCATION/TRAINING PROGRAM

## 2023-03-10 PROCEDURE — 99214 OFFICE O/P EST MOD 30 MIN: CPT | Performed by: STUDENT IN AN ORGANIZED HEALTH CARE EDUCATION/TRAINING PROGRAM

## 2023-03-10 RX ORDER — SACCHAROMYCES BOULARDII 250 MG
250 CAPSULE ORAL 2 TIMES DAILY
Qty: 90 CAPSULE | Refills: 2 | Status: SHIPPED | OUTPATIENT
Start: 2023-03-10 | End: 2023-03-31 | Stop reason: SDUPTHER

## 2023-03-10 RX ORDER — ALPRAZOLAM 0.5 MG/1
0.5 TABLET ORAL NIGHTLY PRN
Qty: 30 TABLET | Refills: 0 | Status: SHIPPED | OUTPATIENT
Start: 2023-03-10

## 2023-03-10 RX ORDER — CEPHALEXIN 500 MG/1
500 CAPSULE ORAL 4 TIMES DAILY
Qty: 28 CAPSULE | Refills: 0 | Status: SHIPPED | OUTPATIENT
Start: 2023-03-10 | End: 2023-03-17

## 2023-03-10 RX ORDER — METHENAMINE, SODIUM PHOSPHATE, MONOBASIC, MONOHYDRATE, PHENYL SALICYLATE, METHYLENE BLUE, AND HYOSCYAMINE SULFATE 120; 40.8; 36; 10; .12 MG/1; MG/1; MG/1; MG/1; MG/1
1 CAPSULE ORAL 4 TIMES DAILY
Qty: 8 CAPSULE | Refills: 0 | Status: SHIPPED | OUTPATIENT
Start: 2023-03-10 | End: 2023-03-12

## 2023-03-10 RX ORDER — SUMATRIPTAN 100 MG/1
TABLET, FILM COATED ORAL
Qty: 30 TABLET | Refills: 2 | Status: SHIPPED | OUTPATIENT
Start: 2023-03-10

## 2023-03-10 NOTE — PROGRESS NOTES
"  Hay Serna M.D.  Internal Medicine  [facility]  [address], [address]  [place] [address]  [phone]      Chief Complaint  Urinary Frequency    SUBJECTIVE    History of Present Illness    Sirisha Auguste is a 87 y.o. female with hypertension, hyperlipidemia, atrial fibrillation who presents to the office today as an established patient that last saw me on 1/26/2023.     This is her 3rd UTI this year. She has to manually disimpact hersel and then she also has fecal incontince.     Symptoms started the day before yesterday. She has \"burning like fire.\" No blood in urine.  She shivers and grabs her legs. She took Urabel. No fevers.     She is having small hard BM daily. She has tried benefiber and prunes but this is not helping. She has right lower quadrant pain. She has tried miralax and docusate but neither helped much.     She had a e coli UTI in [facility] that was sensitive to everything.     She is here today for urgency with urination. She has recently been traveling.     [place] have been worse with migraines.     She follows with Uro Gyn    Review of Systems    Allergies   Allergen Reactions   • Atorvastatin Myalgia   • Sulfa Antibiotics Other (See Comments)     unknown        Outpatient Medications Marked as Taking for the 3/10/23 encounter (Office Visit) with Hay Serna MD   Medication Sig Dispense Refill   • ALPRAZolam (XANAX) 0.5 MG tablet Take 1 tablet by mouth At Night As Needed for Anxiety or Sleep. 30 tablet 0   • diclofenac (VOLTAREN) 1 % gel gel Apply 4 g topically 4 (Four) Times a Day As Needed.     • esomeprazole (nexIUM) 40 MG capsule Take 1 capsule by mouth Every Morning Before Breakfast. 90 capsule 3   • estradiol (ESTRACE) 0.1 MG/GM vaginal cream Insert 2 g into the vagina Daily.     • ezetimibe-simvastatin (VYTORIN) 10-20 MG per tablet Take 1 tablet by mouth Every Night. 90 tablet 3   • meclizine (ANTIVERT) 25 MG tablet Take 1 tablet by mouth 3 (Three) " Times a Day As Needed for dizziness. 180 tablet 1   • metoprolol succinate XL (TOPROL-XL) 50 MG 24 hr tablet Take 1 tablet by mouth Daily. 90 tablet 2   • montelukast (SINGULAIR) 10 MG tablet Take 1 tablet by mouth Every Night. 90 tablet 3   • Multiple Minerals-Vitamins (ADVANCED CALCIUM/D/MAGNESIUM) tablet Take  by mouth. not taking     • Multiple Vitamins-Minerals (MULTIVITAMIN & MINERAL PO) Take 1 tablet by mouth Daily.     • Potassium Gluconate 550 MG tablet Take 550 mg by mouth Daily.     • Probiotic Product (PROBIOTIC PO) Take  by mouth Every Other Day.     • promethazine-codeine (PHENERGAN with CODEINE) 6.25-10 MG/5ML solution TAKE 5 ML (1 TEASPOONFUL) EVERY 4 HOURS AS NEEDED FOR COUGH 90 mL 0   • SUMAtriptan (IMITREX) 100 MG tablet TAKE 1 TABLET AT ONSET OF HEADACHE. MAY REPEAT DOSE ONE TIME IN 2 HOURS IF HEADACHE NOT RELIEVED. If used for 10 or more days per month may lead to worsening of headaches 30 tablet 2   • VITAMIN D, CHOLECALCIFEROL, PO Take 2,000 Units by mouth Daily.     • [DISCONTINUED] ALPRAZolam (XANAX) 0.5 MG tablet Take 1 tablet by mouth At Night As Needed for Anxiety. 30 tablet 0   • [DISCONTINUED] saccharomyces boulardii (Florastor) 250 MG capsule Take 1 capsule by mouth 2 (Two) Times a Day. 30 capsule 0   • [DISCONTINUED] SUMAtriptan (IMITREX) 100 MG tablet TAKE 1 TABLET AT ONSET OF HEADACHE. MAY REPEAT DOSE ONE TIME IN 2 HOURS IF HEADACHE NOT RELIEVED. If used for 10 or more days per month may lead to worsening of headaches 30 tablet 2        Past Medical History:   Diagnosis Date   • Anxiety 1985+/-   • Arthritis    • Breast cyst    • GERD (gastroesophageal reflux disease)    • Hyperglycemia    • Hyperlipidemia    • Hypertension    • Low back pain    • Migraines    • Murmur    • Osteopenia    • RBBB    • Urinary tract infection     many times over years     Past Surgical History:   Procedure Laterality Date   • BLADDER SURGERY     • BREAST CYST ASPIRATION     • COLONOSCOPY N/A 7/14/2022  "   Procedure: COLONOSCOPY TO 20CM WITH COLD BIOPSIES;  Surgeon: Henrik Gaston MD;  Location: [facility] ENDOSCOPY;  Service: Gastroenterology;  Laterality: N/A;  PRE- ABNORMAL CT, BLACK TARRY STOOL, ABDOMINAL PAIN  POST- COLITIS   • ENDOSCOPY N/A 7/12/2022    Procedure: ESOPHAGOGASTRODUODENOSCOPY WITH COLD BIOPSIES;  Surgeon: Yoselyn Gates MD;  Location: [facility] ENDOSCOPY;  Service: Gastroenterology;  Laterality: N/A;  PRE- DYSPEPSIA, MELENA  POST- MILD GASTRITIS, SMALL HIATAL HERNIA   • HYSTERECTOMY     • OOPHORECTOMY       Family History   Problem Relation Age of Onset   • Cancer Sister         breast   • Breast cancer Sister    • Cancer Maternal Aunt         breast   • Breast cancer Maternal Aunt    • Anxiety disorder Mother         no panic attacks known   • Arthritis Mother         very bad case    reports that she quit smoking about 62 years ago. Her smoking use included cigarettes. She started smoking about 63 years ago. She has a 0.13 pack-year smoking history. She has never used smokeless tobacco. She reports that she does not drink alcohol and does not use drugs.    OBJECTIVE    Vital Signs:   /88   Pulse 92   Temp 98.2 °F (36.8 °C)   Ht 162.6 cm (64.02\")   Wt 58.1 kg (128 lb)   SpO2 97%   BMI 21.96 kg/m²     Physical Exam  Constitutional:       Appearance: Normal appearance. She is normal weight.   Cardiovascular:      Rate and Rhythm: Normal rate and regular rhythm.      Heart sounds: Normal heart sounds. No murmur heard.  Pulmonary:      Effort: Pulmonary effort is normal.      Breath sounds: Normal breath sounds.   Abdominal:      General: Abdomen is flat. There is no distension.      Palpations: Abdomen is soft.      Tenderness: There is no abdominal tenderness.   Musculoskeletal:      Right hip: Normal. No tenderness. Normal range of motion.   Skin:     General: Skin is warm and dry.   Neurological:      Mental Status: She is alert.   Psychiatric:         Mood and Affect: Mood normal.    "      Behavior: Behavior normal.         Thought Content: Thought content normal.            The following data was reviewed by: Hay Serna MD on 03/10/2023:  Common labs    Common Labs 7/14/22 7/14/22 7/15/22 7/15/22 1/26/23 1/26/23 1/26/23 1/26/23    0442 [id number] 0542 0542 1439 1439 1439 1439   Glucose 96   101 (A)  103 (A)     BUN 5 (A)   8  18     Creatinine 0.73   0.71  0.85     Sodium 132 (A)   135 (A)  138     Potassium 4.1   3.6  4.4     Chloride 99   101  102     Calcium 9.6   9.6  10.7 (A)     Total Protein      7.2     Albumin      4.7     Total Bilirubin      0.8     Alkaline Phosphatase      67     AST (SGOT)      25     ALT (SGPT)      10     WBC  4.63 4.37  6.06      Hemoglobin  12.1 12.2  13.2      Hematocrit  35.6 36.7  40.3      Platelets  194 204  190      Total Cholesterol       167    Triglycerides       118    HDL Cholesterol       65 (A)    LDL Cholesterol        81    Hemoglobin A1C        5.60   (A) Abnormal value       Comments are available for some flowsheets but are not being displayed.           Data reviewed: note from last visit             ASSESSMENT & PLAN     Diagnoses and all orders for this visit:    1. Urinary frequency (Primary)  -     POCT urinalysis dipstick, automated  -     cephalexin (Keflex) 500 MG capsule; Take 1 capsule by mouth 4 (Four) Times a Day for 7 days.  Dispense: 28 capsule; Refill: 0  -     saccharomyces boulardii (Florastor) 250 MG capsule; Take 1 capsule by mouth 2 (Two) Times a Day.  Dispense: 90 capsule; Refill: 2  -     uribel (URO-MP) 118 MG capsule capsule; Take 1 capsule by mouth 4 (Four) Times a Day for 2 days.  Dispense: 8 capsule; Refill: 0  -     Urine Culture - Urine, Urine, Clean Catch    2. Diarrhea, unspecified type    3. Chronic migraine without aura without status migrainosus, not intractable  -     SUMAtriptan (IMITREX) 100 MG tablet; TAKE 1 TABLET AT ONSET OF HEADACHE. MAY REPEAT DOSE ONE TIME IN 2 HOURS IF HEADACHE NOT RELIEVED. If used for  10 or more days per month may lead to worsening of headaches  Dispense: 30 tablet; Refill: 2    4. Anxiety  -     ALPRAZolam (XANAX) 0.5 MG tablet; Take 1 tablet by mouth At Night As Needed for Anxiety or Sleep.  Dispense: 30 tablet; Refill: 0    5. Constipation, unspecified constipation type     86 yo with frequent UTIs followed by uro gyn. She has stool incontinence that I suspect is overflow incontinence. Will try to mitigate this with daily miralax. This may help prevent UTI as well    She notes some hip or groin pain today on the right. Etiology is unclear but her daughter states she has described this as GI upset in past so will try to manage constipation and then reevaluate.      Health Maintenance Due   Topic Date Due   • DXA SCAN  Never done   • TDAP/TD VACCINES (1 - Tdap) Never done   • ANNUAL WELLNESS VISIT  03/20/2019   • ZOSTER VACCINE (3 of 3) 07/23/2019        Follow Up  No follow-ups on file.    Patient/family had no further questions at this time and verbalized understanding of the plan discussed today.

## 2023-03-31 DIAGNOSIS — K21.9 GASTROESOPHAGEAL REFLUX DISEASE, UNSPECIFIED WHETHER ESOPHAGITIS PRESENT: ICD-10-CM

## 2023-03-31 DIAGNOSIS — R35.0 URINARY FREQUENCY: ICD-10-CM

## 2023-03-31 RX ORDER — SACCHAROMYCES BOULARDII 250 MG
250 CAPSULE ORAL 2 TIMES DAILY
Qty: 90 CAPSULE | Refills: 2 | Status: SHIPPED | OUTPATIENT
Start: 2023-03-31

## 2023-03-31 RX ORDER — ESOMEPRAZOLE MAGNESIUM 40 MG/1
40 CAPSULE, DELAYED RELEASE ORAL
Qty: 90 CAPSULE | Refills: 3 | Status: SHIPPED | OUTPATIENT
Start: 2023-03-31

## 2023-04-06 RX ORDER — CEPHALEXIN 500 MG/1
500 CAPSULE ORAL 4 TIMES DAILY
Qty: 28 CAPSULE | Refills: 0 | Status: SHIPPED | OUTPATIENT
Start: 2023-04-06 | End: 2023-04-13

## 2023-04-21 ENCOUNTER — OFFICE VISIT (OUTPATIENT)
Dept: INTERNAL MEDICINE | Facility: CLINIC | Age: 88
End: 2023-04-21
Payer: MEDICARE

## 2023-04-21 VITALS
DIASTOLIC BLOOD PRESSURE: 79 MMHG | OXYGEN SATURATION: 97 % | BODY MASS INDEX: 21.85 KG/M2 | HEART RATE: 60 BPM | WEIGHT: 128 LBS | TEMPERATURE: 98 F | SYSTOLIC BLOOD PRESSURE: 153 MMHG | HEIGHT: 64 IN

## 2023-04-21 DIAGNOSIS — R35.0 URINE FREQUENCY: ICD-10-CM

## 2023-04-21 DIAGNOSIS — N30.00 ACUTE CYSTITIS WITHOUT HEMATURIA: Primary | ICD-10-CM

## 2023-04-21 LAB
BILIRUB BLD-MCNC: NEGATIVE MG/DL
CLARITY, POC: CLEAR
COLOR UR: YELLOW
EXPIRATION DATE: ABNORMAL
GLUCOSE UR STRIP-MCNC: NEGATIVE MG/DL
KETONES UR QL: NEGATIVE
LEUKOCYTE EST, POC: ABNORMAL
Lab: ABNORMAL
NITRITE UR-MCNC: NEGATIVE MG/ML
PH UR: 5.5 [PH] (ref 5–8)
PROT UR STRIP-MCNC: NEGATIVE MG/DL
RBC # UR STRIP: ABNORMAL /UL
SP GR UR: 1.03 (ref 1–1.03)
UROBILINOGEN UR QL: ABNORMAL

## 2023-04-21 RX ORDER — NITROFURANTOIN 25; 75 MG/1; MG/1
100 CAPSULE ORAL 2 TIMES DAILY
Qty: 14 CAPSULE | Refills: 0 | Status: SHIPPED | OUTPATIENT
Start: 2023-04-21 | End: 2023-04-28

## 2023-04-21 NOTE — PROGRESS NOTES
Hay Serna M.D.  Internal Medicine  [facility]  [address], [address]  [place] [address]  [phone]      Chief Complaint  Urinary Frequency    SUBJECTIVE    History of Present Illness    Sirisha Auguste is a 87 y.o. female who presents to the office today as an established patient that last saw me on 3/10/2023.     She feels burning with urination. Feels she is not going as much. She was recently treated on April 6 for UTI with cephalexin. Feels incomplete stool evacuation and feels disimpacting herself gave her UTI. Has 4 BMs/day. Stool is softer since taking probiotic. She is also eating prunes. She also has occasional diarrhea. Fiber packet in water gave her diarrhea so she cut back to smaller.  This is her fifth UTI this year.  She also has constipation and stool incontinence. Sigmoidoscopy on May 8th with Dr. Gates pending. No fevers or chills.     Previously had migraines. She now is having auras that she did not have in years. Happens in one eye or the other and sometimes both. Taking imitrex for this. Imitrex prevents migaine. Has migraine 3-4 times/week.     Review of Systems    Allergies   Allergen Reactions   • Atorvastatin Myalgia   • Sulfa Antibiotics Other (See Comments)     unknown        Outpatient Medications Marked as Taking for the 4/21/23 encounter (Office Visit) with Hay Serna MD   Medication Sig Dispense Refill   • ALPRAZolam (XANAX) 0.5 MG tablet Take 1 tablet by mouth At Night As Needed for Anxiety or Sleep. 30 tablet 0   • diclofenac (VOLTAREN) 1 % gel gel Apply 4 g topically 4 (Four) Times a Day As Needed.     • esomeprazole (nexIUM) 40 MG capsule Take 1 capsule by mouth Every Morning Before Breakfast. 90 capsule 3   • estradiol (ESTRACE) 0.1 MG/GM vaginal cream Insert 2 g into the vagina Daily.     • ezetimibe-simvastatin (VYTORIN) 10-20 MG per tablet Take 1 tablet by mouth Every Night. 90 tablet 3   • meclizine (ANTIVERT) 25 MG tablet Take 1 tablet  by mouth 3 (Three) Times a Day As Needed for dizziness. 180 tablet 1   • metoprolol succinate XL (TOPROL-XL) 50 MG 24 hr tablet Take 1 tablet by mouth Daily. 90 tablet 2   • montelukast (SINGULAIR) 10 MG tablet Take 1 tablet by mouth Every Night. 90 tablet 3   • Multiple Minerals-Vitamins (ADVANCED CALCIUM/D/MAGNESIUM) tablet Take  by mouth. not taking     • Multiple Vitamins-Minerals (MULTIVITAMIN & MINERAL PO) Take 1 tablet by mouth Daily.     • Potassium Gluconate 550 MG tablet Take 550 mg by mouth Daily.     • Probiotic Product (PROBIOTIC PO) Take  by mouth Every Other Day.     • promethazine-codeine (PHENERGAN with CODEINE) 6.25-10 MG/5ML solution TAKE 5 ML (1 TEASPOONFUL) EVERY 4 HOURS AS NEEDED FOR COUGH 90 mL 0   • saccharomyces boulardii (Florastor) 250 MG capsule Take 1 capsule by mouth 2 (Two) Times a Day. 90 capsule 2   • SUMAtriptan (IMITREX) 100 MG tablet TAKE 1 TABLET AT ONSET OF HEADACHE. MAY REPEAT DOSE ONE TIME IN 2 HOURS IF HEADACHE NOT RELIEVED. If used for 10 or more days per month may lead to worsening of headaches 30 tablet 2   • VITAMIN D, CHOLECALCIFEROL, PO Take 2,000 Units by mouth Daily.          Past Medical History:   Diagnosis Date   • Anxiety 1985+/-   • Arthritis    • Breast cyst    • GERD (gastroesophageal reflux disease)    • Hyperglycemia    • Hyperlipidemia    • Hypertension    • Low back pain    • Migraines    • Murmur    • Osteopenia    • RBBB    • Urinary tract infection     many times over years     Past Surgical History:   Procedure Laterality Date   • BLADDER SURGERY     • BREAST CYST ASPIRATION     • COLONOSCOPY N/A 7/14/2022    Procedure: COLONOSCOPY TO 20CM WITH COLD BIOPSIES;  Surgeon: Henrik Gaston MD;  Location: [facility] ENDOSCOPY;  Service: Gastroenterology;  Laterality: N/A;  PRE- ABNORMAL CT, BLACK TARRY STOOL, ABDOMINAL PAIN  POST- COLITIS   • ENDOSCOPY N/A 7/12/2022    Procedure: ESOPHAGOGASTRODUODENOSCOPY WITH COLD BIOPSIES;  Surgeon: Yoselyn Gates MD;   "Location: [facility] ENDOSCOPY;  Service: Gastroenterology;  Laterality: N/A;  PRE- DYSPEPSIA, MELENA  POST- MILD GASTRITIS, SMALL HIATAL HERNIA   • HYSTERECTOMY     • OOPHORECTOMY       Family History   Problem Relation Age of Onset   • Cancer Sister         breast   • Breast cancer Sister    • Cancer Maternal Aunt         breast   • Breast cancer Maternal Aunt    • Anxiety disorder Mother         no panic attacks known   • Arthritis Mother         very bad case    reports that she quit smoking about 62 years ago. Her smoking use included cigarettes. She started smoking about 63 years ago. She has a 0.13 pack-year smoking history. She has never used smokeless tobacco. She reports that she does not drink alcohol and does not use drugs.    OBJECTIVE    Vital Signs:   /79   Pulse 60   Temp 98 °F (36.7 °C)   Ht 162.6 cm (64.02\")   Wt 58.1 kg (128 lb)   SpO2 97%   BMI 21.96 kg/m²     Physical Exam  Constitutional:       Appearance: Normal appearance. She is normal weight.   Cardiovascular:      Rate and Rhythm: Normal rate and regular rhythm.      Heart sounds: Normal heart sounds. No murmur heard.  Pulmonary:      Effort: Pulmonary effort is normal.      Breath sounds: Normal breath sounds.   Abdominal:      General: Abdomen is flat. There is no distension.      Palpations: Abdomen is soft.      Tenderness: There is no right CVA tenderness or left CVA tenderness.      Comments: Lower abdominal tenderness   Skin:     General: Skin is warm and dry.   Neurological:      Mental Status: She is alert.   Psychiatric:         Mood and Affect: Mood normal.         Behavior: Behavior normal.         Thought Content: Thought content normal.            The following data was reviewed by: Hay Serna MD on 04/21/2023:  Common labs        7/14/2022    04:42 7/15/2022    05:42 1/26/2023    14:39   Common Labs   Glucose 96   101   103     BUN 5   8   18     Creatinine 0.73   0.71   0.85     Sodium 132   135   138     Potassium " 4.1   3.6   4.4     Chloride 99   101   102     Calcium 9.6   9.6   10.7     Total Protein   7.2     Albumin   4.7     Total Bilirubin   0.8     Alkaline Phosphatase   67     AST (SGOT)   25     ALT (SGPT)   10     WBC 4.63   4.37   6.06     Hemoglobin 12.1   12.2   13.2     Hematocrit 35.6   36.7   40.3     Platelets 194   204   190     Total Cholesterol   167     Triglycerides   118     HDL Cholesterol   65     LDL Cholesterol    81     Hemoglobin A1C   5.60       Data reviewed: note from last visit         Brief Urine Lab Results  (Last result in the past 365 days)      Color   Clarity   Blood   Leuk Est   Nitrite   Protein   CREAT   Urine HCG        03/10/23 1232 Green   Clear   Moderate   Large (3+)   Positive   30 mg/dL               Urine dipstick shows positive for WBC's.       ASSESSMENT & PLAN     Diagnoses and all orders for this visit:    1. Acute cystitis without hematuria (Primary)  -     nitrofurantoin, macrocrystal-monohydrate, (Macrobid) 100 MG capsule; Take 1 capsule by mouth 2 (Two) Times a Day for 7 days.  Dispense: 14 capsule; Refill: 0    2. Urine frequency  -     POCT urinalysis dipstick, automated  -     Urine Culture - Urine, Urine, Clean Catch; Future    87-year-old with hypertension, migraines here today for UTI symptoms.  UA today significant for leukocytes.  It is likely her stool incontinence and constipation are contributing to recurrent UTIs as she is having to disimpact herself.  She started probiotic with some improvement.  She will continue to try to manage constipation with fiber and over-the-counter stool softeners.  I discussed Kegel exercises today for stool incontinence.      Health Maintenance Due   Topic Date Due   • DXA SCAN  Never done   • TDAP/TD VACCINES (1 - Tdap) Never done   • ANNUAL WELLNESS VISIT  03/20/2019   • ZOSTER VACCINE (3 of 3) 07/23/2019        Follow Up  No follow-ups on file.    Patient/family had no further questions at this time and verbalized  understanding of the plan discussed today.

## 2023-04-24 ENCOUNTER — TELEPHONE (OUTPATIENT)
Dept: GASTROENTEROLOGY | Facility: CLINIC | Age: 88
End: 2023-04-24
Payer: MEDICARE

## 2023-04-26 ENCOUNTER — OFFICE VISIT (OUTPATIENT)
Dept: INTERNAL MEDICINE | Facility: CLINIC | Age: 88
End: 2023-04-26
Payer: MEDICARE

## 2023-04-26 VITALS
OXYGEN SATURATION: 97 % | WEIGHT: 122.8 LBS | SYSTOLIC BLOOD PRESSURE: 145 MMHG | TEMPERATURE: 97.6 F | BODY MASS INDEX: 20.96 KG/M2 | HEART RATE: 61 BPM | DIASTOLIC BLOOD PRESSURE: 72 MMHG | HEIGHT: 64 IN

## 2023-04-26 DIAGNOSIS — Z78.0 POST-MENOPAUSAL: Primary | ICD-10-CM

## 2023-04-26 DIAGNOSIS — G43.009 MIGRAINE WITHOUT AURA AND WITHOUT STATUS MIGRAINOSUS, NOT INTRACTABLE: ICD-10-CM

## 2023-04-26 NOTE — PROGRESS NOTES
"The ABCs of the Annual Wellness Visit  Subsequent Medicare Wellness Visit    Chief Complaint   Patient presents with   • Medicare Wellness-subsequent      Subjective    History of Present Illness:  Sirisha Auguste is a 87 y.o. female who presents for a Subsequent Medicare Wellness Visit.    The following portions of the patient's history were reviewed and   updated as appropriate: allergies, current medications, past family history, past medical history, past social history, past surgical history and problem list.    At last appointment she noticed auras. Feels \"head is empty\". Feels out out of body. Feels lightheaded/ Imitrex helps with migraines. Feels lightheaded for short amounts of time 2-3 times.week for 2 week. No passing out. No falls. She does sit down prior to these episodes    She has chronic vertigo from Meniere's    Compared to one year ago, the patient feels her physical   health is worse because of arthritis  .  Compared to one year ago, the patient feels her mental   health is the same. States she has trouble remembering things at time.     Frequent UTIs-going to see Dr. Guillen     Recent Hospitalizations:  This patient has had a [facility] admission record on file within the last 365 days.    Current Medical Providers:  Patient Care Team:  Hay Serna MD as PCP - General (Internal Medicine)   Dr. Gates (GI)  GYN-Dr. Guillen        Outpatient Medications Prior to Visit   Medication Sig Dispense Refill   • ALPRAZolam (XANAX) 0.5 MG tablet Take 1 tablet by mouth At Night As Needed for Anxiety or Sleep. 30 tablet 0   • diclofenac (VOLTAREN) 1 % gel gel Apply 4 g topically 4 (Four) Times a Day As Needed.     • esomeprazole (nexIUM) 40 MG capsule Take 1 capsule by mouth Every Morning Before Breakfast. 90 capsule 3   • estradiol (ESTRACE) 0.1 MG/GM vaginal cream Insert 2 g into the vagina Daily.     • ezetimibe-simvastatin (VYTORIN) 10-20 MG per tablet Take 1 tablet by mouth Every Night. 90 " tablet 3   • meclizine (ANTIVERT) 25 MG tablet Take 1 tablet by mouth 3 (Three) Times a Day As Needed for dizziness. 180 tablet 1   • metoprolol succinate XL (TOPROL-XL) 50 MG 24 hr tablet Take 1 tablet by mouth Daily. 90 tablet 2   • montelukast (SINGULAIR) 10 MG tablet Take 1 tablet by mouth Every Night. 90 tablet 3   • Multiple Minerals-Vitamins (ADVANCED CALCIUM/D/MAGNESIUM) tablet Take  by mouth. not taking     • Multiple Vitamins-Minerals (MULTIVITAMIN & MINERAL PO) Take 1 tablet by mouth Daily.     • nitrofurantoin, macrocrystal-monohydrate, (Macrobid) 100 MG capsule Take 1 capsule by mouth 2 (Two) Times a Day for 7 days. 14 capsule 0   • Potassium Gluconate 550 MG tablet Take 550 mg by mouth Daily.     • Probiotic Product (PROBIOTIC PO) Take  by mouth Every Other Day.     • promethazine-codeine (PHENERGAN with CODEINE) 6.25-10 MG/5ML solution TAKE 5 ML (1 TEASPOONFUL) EVERY 4 HOURS AS NEEDED FOR COUGH 90 mL 0   • saccharomyces boulardii (Florastor) 250 MG capsule Take 1 capsule by mouth 2 (Two) Times a Day. 90 capsule 2   • SUMAtriptan (IMITREX) 100 MG tablet TAKE 1 TABLET AT ONSET OF HEADACHE. MAY REPEAT DOSE ONE TIME IN 2 HOURS IF HEADACHE NOT RELIEVED. If used for 10 or more days per month may lead to worsening of headaches 30 tablet 2   • VITAMIN D, CHOLECALCIFEROL, PO Take 2,000 Units by mouth Daily.       No facility-administered medications prior to visit.       No opioid medication identified on active medication list. I have reviewed chart for other potential  high risk medication/s and harmful drug interactions in the elderly.          Aspirin is not on active medication list.  Aspirin use is not indicated based on review of current medical condition/s. Risk of harm outweighs potential benefits.  .    Patient Active Problem List   Diagnosis   • Benign essential hypertension   • Borderline hyperglycemia   • Gastroesophageal reflux disease   • Hyperlipidemia   • Migraine   • Heart murmur   • Right  "fascicular block   • Acute thoracic back pain   • Dyspnea on exertion   • Recurrent vertigo   • Generalized osteoarthritis of multiple sites   • Diastolic dysfunction   • Chronic fatigue   • Mild concentric left ventricular hypertrophy (LVH)   • Hypercalcemia   • Hematoma   • Generalized anxiety disorder   • Chronic migraine without aura without status migrainosus, not intractable   • RBBB   • Abnormal CT of the abdomen   • Bacteremia   • Chronic atrial fibrillation with rapid ventricular response   • E. coli colitis   • Colitis   • Lower abdominal pain     Advance Care Planning  Advance Directive is on file.  ACP discussion was held with the patient during this visit. Patient has an advance directive in EMR which is still valid. Copy requested.           Objective    Vitals:    04/26/23 1037   BP: 145/72   Pulse: 61   Temp: 97.6 °F (36.4 °C)   SpO2: 97%   Weight: 55.7 kg (122 lb 12.8 oz)   Height: 162.6 cm (64.02\")     Estimated body mass index is 21.07 kg/m² as calculated from the following:    Height as of this encounter: 162.6 cm (64.02\").    Weight as of this encounter: 55.7 kg (122 lb 12.8 oz).    BMI is within normal parameters. No other follow-up for BMI required.      Does the patient have evidence of cognitive impairment? Yes-she natasha an abnormal clock today without numbers and 1 hand and remembered 2 out of 3 words on 5-minute recall.    Physical Exam  Constitutional:       Appearance: Normal appearance. She is normal weight.   Cardiovascular:      Rate and Rhythm: Normal rate and regular rhythm.      Heart sounds: Normal heart sounds. No murmur heard.  Pulmonary:      Effort: Pulmonary effort is normal.      Breath sounds: Normal breath sounds.   Abdominal:      General: Abdomen is flat. There is no distension.      Palpations: Abdomen is soft.      Tenderness: There is no abdominal tenderness.   Musculoskeletal:      Right lower leg: No edema.      Left lower leg: No edema.   Skin:     General: Skin is " warm and dry.   Neurological:      Mental Status: She is alert.   Psychiatric:         Mood and Affect: Mood normal.         Behavior: Behavior normal.         Thought Content: Thought content normal.                 HEALTH RISK ASSESSMENT    Smoking Status:  Social History     Tobacco Use   Smoking Status Former   • Packs/day: 0.25   • Years: 0.50   • Pack years: 0.13   • Types: Cigarettes   • Start date: 1960   • Quit date: 1960   • Years since quittin.9   Smokeless Tobacco Never   Tobacco Comments    I just quit     Alcohol Consumption:  Social History     Substance and Sexual Activity   Alcohol Use Never     Fall Risk Screen:    STEADI Fall Risk Assessment was completed, and patient is at LOW risk for falls.Assessment completed on:2023    Depression Screenin/26/2023    10:38 AM   PHQ-2/PHQ-9 Depression Screening   Little Interest or Pleasure in Doing Things 0-->not at all   Feeling Down, Depressed or Hopeless 0-->not at all   PHQ-9: Brief Depression Severity Measure Score 0       Health Habits and Functional and Cognitive Screenin/26/2023    10:38 AM   Functional & Cognitive Status   Do you have difficulty preparing food and eating? No   Do you have difficulty bathing yourself, getting dressed or grooming yourself? Yes   Do you have difficulty using the toilet? No   Do you have difficulty moving around from place to place? No   Do you have trouble with steps or getting out of a bed or a chair? Yes   Do you need help using the phone?  No   Are you deaf or do you have serious difficulty hearing?  No   Do you need help with transportation? Yes   Do you need help shopping? Yes   Do you need help preparing meals?  Yes   Do you need help with housework?  Yes   Do you need help with laundry? Yes   Do you need help taking your medications? No   Do you need help managing money? No   Do you ever drive or ride in a car without wearing a seat belt? No       Age-appropriate Screening  Schedule:  Refer to the list below for future screening recommendations based on patient's age, sex and/or medical conditions. Orders for these recommended tests are listed in the plan section. The patient has been provided with a written plan.    Health Maintenance   Topic Date Due   • DXA SCAN  Never done   • TDAP/TD VACCINES (1 - Tdap) Never done   • ANNUAL WELLNESS VISIT  03/20/2019   • ZOSTER VACCINE (3 of 3) 07/23/2019   • INFLUENZA VACCINE  08/01/2023   • LIPID PANEL  01/26/2024   • COVID-19 Vaccine  Completed   • Pneumococcal Vaccine 65+  Completed   • MAMMOGRAM  Discontinued              Assessment & Plan   CMS Preventative Services Quick Reference  Risk Factors Identified During Encounter  Immunizations Discussed/Encouraged: Shingrix and COVID19  Polypharmacy: Medication List reviewed  The above risks/problems have been discussed with the patient.  Follow up actions/plans if indicated are seen below in the Assessment/Plan Section.  Pertinent information has been shared with the patient in the After Visit Summary.    Diagnoses and all orders for this visit:    1. Post-menopausal (Primary)  -     DEXA Bone Density Axial    2. Migraine without aura and without status migrainosus, not intractable  -     Ambulatory Referral to Neurology  -     MRI Brain Without Contrast; Future      In addition to an annual Medicare visit a problem-based visit was performed today.    Discussed worsening migraines and older adults is somewhat concerning.  Will order MRI to further evaluate this and refer to neurology.  It appears overall symptoms are well managed with Imitrex currently.    Follow Up:   Return in about 3 months (around 7/26/2023).     An After Visit Summary and PPPS were made available to the patient.

## 2023-04-27 LAB
BACTERIA UR CULT: ABNORMAL
BACTERIA UR CULT: ABNORMAL
OTHER ANTIBIOTIC SUSC ISLT: ABNORMAL

## 2023-05-08 ENCOUNTER — HOSPITAL ENCOUNTER (OUTPATIENT)
Facility: HOSPITAL | Age: 88
Setting detail: HOSPITAL OUTPATIENT SURGERY
Discharge: HOME | End: 2023-05-08
Attending: INTERNAL MEDICINE | Admitting: INTERNAL MEDICINE
Payer: MEDICARE

## 2023-05-08 ENCOUNTER — ANESTHESIA EVENT (OUTPATIENT)
Dept: GASTROENTEROLOGY | Facility: HOSPITAL | Age: 88
End: 2023-05-08
Payer: MEDICARE

## 2023-05-08 ENCOUNTER — ANESTHESIA (OUTPATIENT)
Dept: GASTROENTEROLOGY | Facility: HOSPITAL | Age: 88
End: 2023-05-08
Payer: MEDICARE

## 2023-05-08 VITALS
WEIGHT: 121 LBS | TEMPERATURE: 97.7 F | HEART RATE: 57 BPM | SYSTOLIC BLOOD PRESSURE: 133 MMHG | OXYGEN SATURATION: 94 % | BODY MASS INDEX: 20.66 KG/M2 | DIASTOLIC BLOOD PRESSURE: 78 MMHG | HEIGHT: 64 IN | RESPIRATION RATE: 16 BRPM

## 2023-05-08 PROCEDURE — 25010000002 PROPOFOL 10 MG/ML EMULSION: Performed by: ANESTHESIOLOGY

## 2023-05-08 PROCEDURE — S0260 H&P FOR SURGERY: HCPCS | Performed by: INTERNAL MEDICINE

## 2023-05-08 PROCEDURE — 45330 DIAGNOSTIC SIGMOIDOSCOPY: CPT | Performed by: INTERNAL MEDICINE

## 2023-05-08 RX ORDER — SODIUM CHLORIDE 0.9 % (FLUSH) 0.9 %
10 SYRINGE (ML) INJECTION AS NEEDED
Status: DISCONTINUED | OUTPATIENT
Start: 2023-05-08 | End: 2023-05-08 | Stop reason: HOSPADM

## 2023-05-08 RX ORDER — SODIUM CHLORIDE 9 MG/ML
40 INJECTION, SOLUTION INTRAVENOUS AS NEEDED
Status: DISCONTINUED | OUTPATIENT
Start: 2023-05-08 | End: 2023-05-08 | Stop reason: HOSPADM

## 2023-05-08 RX ORDER — SODIUM CHLORIDE 0.9 % (FLUSH) 0.9 %
10 SYRINGE (ML) INJECTION EVERY 12 HOURS SCHEDULED
Status: DISCONTINUED | OUTPATIENT
Start: 2023-05-08 | End: 2023-05-08 | Stop reason: HOSPADM

## 2023-05-08 RX ORDER — PROPOFOL 10 MG/ML
VIAL (ML) INTRAVENOUS AS NEEDED
Status: DISCONTINUED | OUTPATIENT
Start: 2023-05-08 | End: 2023-05-08 | Stop reason: SURG

## 2023-05-08 RX ORDER — SODIUM CHLORIDE, SODIUM LACTATE, POTASSIUM CHLORIDE, CALCIUM CHLORIDE 600; 310; 30; 20 MG/100ML; MG/100ML; MG/100ML; MG/100ML
30 INJECTION, SOLUTION INTRAVENOUS CONTINUOUS PRN
Status: DISCONTINUED | OUTPATIENT
Start: 2023-05-08 | End: 2023-05-08 | Stop reason: HOSPADM

## 2023-05-08 RX ORDER — LIDOCAINE HYDROCHLORIDE 20 MG/ML
INJECTION, SOLUTION INFILTRATION; PERINEURAL AS NEEDED
Status: DISCONTINUED | OUTPATIENT
Start: 2023-05-08 | End: 2023-05-08 | Stop reason: SURG

## 2023-05-08 RX ADMIN — LIDOCAINE HYDROCHLORIDE 70 MG: 20 INJECTION, SOLUTION INFILTRATION; PERINEURAL at 14:15

## 2023-05-08 RX ADMIN — SODIUM CHLORIDE, POTASSIUM CHLORIDE, SODIUM LACTATE AND CALCIUM CHLORIDE 30 ML/HR: 600; 310; 30; 20 INJECTION, SOLUTION INTRAVENOUS at 12:41

## 2023-05-08 RX ADMIN — PROPOFOL 200 MG: 10 INJECTION, EMULSION INTRAVENOUS at 14:16

## 2023-05-08 NOTE — DISCHARGE INSTRUCTIONS

## 2023-05-08 NOTE — H&P
[facility]  Pre Procedure History & Physical    Chief Complaint:   Follow-up colitis, abnormal CT    Subjective     HPI:   87-year-old female who underwent attempted colonoscopy in July 2022.  There was significant ulceration and edema at 20 cm from the anal verge.  Scope was aborted due to concern for perforation.  Path showed no significant inflammation.  Biopsies were taken.  CT of the abdomen and pelvis in December 2022 showed thickening in the sigmoid colon suggestive of persistent colitis.    She c/o constipation, rlq pain and hemorrhoidal irritation.    Past Medical History:   Past Medical History:   Diagnosis Date   • Anxiety 1985+/-   • Arthritis    • Breast cyst    • GERD (gastroesophageal reflux disease)    • Hyperglycemia    • Hyperlipidemia    • Hypertension    • Low back pain    • Migraines    • Murmur    • Osteopenia    • RBBB    • Urinary tract infection     many times over years       Past Surgical History:  Past Surgical History:   Procedure Laterality Date   • BLADDER SURGERY     • BREAST CYST ASPIRATION     • COLONOSCOPY N/A 7/14/2022    Procedure: COLONOSCOPY TO 20CM WITH COLD BIOPSIES;  Surgeon: Henrik Gaston MD;  Location: [facility] ENDOSCOPY;  Service: Gastroenterology;  Laterality: N/A;  PRE- ABNORMAL CT, BLACK TARRY STOOL, ABDOMINAL PAIN  POST- COLITIS   • ENDOSCOPY N/A 7/12/2022    Procedure: ESOPHAGOGASTRODUODENOSCOPY WITH COLD BIOPSIES;  Surgeon: Yoselyn Gates MD;  Location: [facility] ENDOSCOPY;  Service: Gastroenterology;  Laterality: N/A;  PRE- DYSPEPSIA, MELENA  POST- MILD GASTRITIS, SMALL HIATAL HERNIA   • HYSTERECTOMY     • OOPHORECTOMY         Family History:  Family History   Problem Relation Age of Onset   • Cancer Sister         breast   • Breast cancer Sister    • Cancer Maternal Aunt         breast   • Breast cancer Maternal Aunt    • Anxiety disorder Mother         no panic attacks known   • Arthritis Mother         very bad case       Social  History:   reports that she quit smoking about 62 years ago. Her smoking use included cigarettes. She started smoking about 63 years ago. She has a 0.13 pack-year smoking history. She has never used smokeless tobacco. She reports that she does not drink alcohol and does not use drugs.    Medications:   Medications Prior to Admission   Medication Sig Dispense Refill Last Dose   • ALPRAZolam (XANAX) 0.5 MG tablet Take 1 tablet by mouth At Night As Needed for Anxiety or Sleep. 30 tablet 0 5/8/2023   • diclofenac (VOLTAREN) 1 % gel gel Apply 4 g topically 4 (Four) Times a Day As Needed.   5/8/2023   • esomeprazole (nexIUM) 40 MG capsule Take 1 capsule by mouth Every Morning Before Breakfast. 90 capsule 3 Past Week   • estradiol (ESTRACE) 0.1 MG/GM vaginal cream Insert 2 g into the vagina Daily.   Past Month   • ezetimibe-simvastatin (VYTORIN) 10-20 MG per tablet Take 1 tablet by mouth Every Night. 90 tablet 3 Past Week   • metoprolol succinate XL (TOPROL-XL) 50 MG 24 hr tablet Take 1 tablet by mouth Daily. 90 tablet 2 5/8/2023   • montelukast (SINGULAIR) 10 MG tablet Take 1 tablet by mouth Every Night. 90 tablet 3 Past Week   • Multiple Minerals-Vitamins (ADVANCED CALCIUM/D/MAGNESIUM) tablet Take  by mouth. not taking   Past Week   • Multiple Vitamins-Minerals (MULTIVITAMIN & MINERAL PO) Take 1 tablet by mouth Daily.   Past Week   • Probiotic Product (PROBIOTIC PO) Take  by mouth Every Other Day.   Past Week   • saccharomyces boulardii (Florastor) 250 MG capsule Take 1 capsule by mouth 2 (Two) Times a Day. 90 capsule 2 Past Week   • SUMAtriptan (IMITREX) 100 MG tablet TAKE 1 TABLET AT ONSET OF HEADACHE. MAY REPEAT DOSE ONE TIME IN 2 HOURS IF HEADACHE NOT RELIEVED. If used for 10 or more days per month may lead to worsening of headaches 30 tablet 2 5/7/2023   • VITAMIN D, CHOLECALCIFEROL, PO Take 2,000 Units by mouth Daily.   Past Week   • meclizine (ANTIVERT) 25 MG tablet Take 1 tablet by mouth 3 (Three) Times a Day As  "Needed for dizziness. 180 tablet 1 More than a month   • Potassium Gluconate 550 MG tablet Take 550 mg by mouth Daily.   More than a month   • promethazine-codeine (PHENERGAN with CODEINE) 6.25-10 MG/5ML solution TAKE 5 ML (1 TEASPOONFUL) EVERY 4 HOURS AS NEEDED FOR COUGH 90 mL 0 More than a month       Allergies:  Atorvastatin, Sulfa antibiotics, and Contrast dye (echo or unknown ct/mr)    ROS:    Pertinent items are noted in HPI, all other systems reviewed and negative     Objective     Blood pressure 148/84, pulse 60, temperature 97.7 °F (36.5 °C), temperature source Oral, resp. rate 16, height 162.6 cm (64\"), weight 54.9 kg (121 lb), SpO2 95 %.    Physical Exam   Constitutional: Pt is oriented to person, place, and time and well-developed, well-nourished, and in no distress.   Mouth/Throat: Oropharynx is clear and moist.   Neck: Normal range of motion.   Cardiovascular: Normal rate, regular rhythm    Pulmonary/Chest: Effort normal    Abdominal: Soft. Nontender  Skin: Skin is warm and dry.   Psychiatric: Mood, memory, affect and judgment normal.     Assessment & Plan     Diagnosis:  Follow-up colitis, abnormal CT    Anticipated Surgical Procedure:  Flex sig with biopsy    The risks, benefits, and alternatives of this procedure have been discussed with the patient or the responsible party- the patient understands and agrees to proceed.                                                              "

## 2023-05-08 NOTE — ANESTHESIA POSTPROCEDURE EVALUATION
"Patient: Sirisha Auguste    Procedure Summary     Date: 05/08/23 Room / Location:  [facility] /  [facility]    Anesthesia Start: 1412 Anesthesia Stop: 1437    Procedure: SIGMOIDOSCOPY FLEXIBLE TO LEFT TRANSVERSE COLON Diagnosis:       Abnormal CT of the abdomen      Colitis      Lower abdominal pain      (Abnormal CT of the abdomen [R93.5])      (Colitis [K52.9])      (Lower abdominal pain [R10.30])    Surgeons: Yoselyn Gates MD Provider: Kristopher Schmitt MD    Anesthesia Type: MAC ASA Status: 3          Anesthesia Type: MAC    Vitals  Vitals Value Taken Time   /78 05/08/23 1458   Temp     Pulse 57 05/08/23 1458   Resp 16 05/08/23 1458   SpO2 94 % 05/08/23 1458           Post Anesthesia Care and Evaluation    Patient location during evaluation: bedside  Patient participation: complete - patient participated  Level of consciousness: awake and alert  Pain management: adequate    Airway patency: patent  Anesthetic complications: No anesthetic complications    Cardiovascular status: acceptable  Respiratory status: acceptable  Hydration status: acceptable    Comments: /78 (BP Location: Left arm, Patient Position: Lying)   Pulse 57   Temp 36.5 °C (97.7 °F) (Oral)   Resp 16   Ht 162.6 cm (64\")   Wt 54.9 kg (121 lb)   SpO2 94%   BMI 20.77 kg/m²       "

## 2023-05-09 DIAGNOSIS — G43.109 MIGRAINE WITH AURA AND WITHOUT STATUS MIGRAINOSUS, NOT INTRACTABLE: Primary | ICD-10-CM

## 2023-05-26 ENCOUNTER — HOSPITAL ENCOUNTER (OUTPATIENT)
Dept: MRI IMAGING | Facility: HOSPITAL | Age: 88
Discharge: HOME | End: 2023-05-26
Payer: MEDICARE

## 2023-05-26 ENCOUNTER — TELEPHONE (OUTPATIENT)
Dept: INTERNAL MEDICINE | Facility: CLINIC | Age: 88
End: 2023-05-26
Payer: MEDICARE

## 2023-05-26 DIAGNOSIS — I67.89 OTHER CEREBROVASCULAR DISEASE: ICD-10-CM

## 2023-05-26 DIAGNOSIS — I63.89 OTHER CEREBRAL INFARCTION: ICD-10-CM

## 2023-05-26 DIAGNOSIS — I63.9 CEREBROVASCULAR ACCIDENT (CVA), UNSPECIFIED MECHANISM: Primary | ICD-10-CM

## 2023-05-26 DIAGNOSIS — G43.109 MIGRAINE WITH AURA AND WITHOUT STATUS MIGRAINOSUS, NOT INTRACTABLE: ICD-10-CM

## 2023-05-26 PROCEDURE — 70551 MRI BRAIN STEM W/O DYE: CPT

## 2023-05-26 NOTE — TELEPHONE ENCOUNTER
I called Sirisha Auguste at [phone] at 17:32 EDT.  I spoke with her daughter.    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  Discussed patient also has chronic small vessel disease.  Discussed that based chronic small infarcts could possibly be related to her worsening migraines.  She states Imitrex has been very helpful but she is still having frequent migraines with aura.  We will reach out to neurology to see if they will see her sooner given MRI findings.

## 2023-07-25 ENCOUNTER — OFFICE VISIT (OUTPATIENT)
Dept: INTERNAL MEDICINE | Facility: CLINIC | Age: 88
End: 2023-07-25
Payer: MEDICARE

## 2023-07-25 VITALS
SYSTOLIC BLOOD PRESSURE: 118 MMHG | HEART RATE: 68 BPM | WEIGHT: 122.2 LBS | HEIGHT: 64 IN | DIASTOLIC BLOOD PRESSURE: 72 MMHG | OXYGEN SATURATION: 97 % | BODY MASS INDEX: 20.86 KG/M2

## 2023-07-25 DIAGNOSIS — J30.2 SEASONAL ALLERGIES: ICD-10-CM

## 2023-07-25 DIAGNOSIS — R53.82 CHRONIC FATIGUE: ICD-10-CM

## 2023-07-25 DIAGNOSIS — G43.709 CHRONIC MIGRAINE WITHOUT AURA WITHOUT STATUS MIGRAINOSUS, NOT INTRACTABLE: Primary | ICD-10-CM

## 2023-07-25 PROCEDURE — 99214 OFFICE O/P EST MOD 30 MIN: CPT | Performed by: STUDENT IN AN ORGANIZED HEALTH CARE EDUCATION/TRAINING PROGRAM

## 2023-07-25 RX ORDER — FLUTICASONE PROPIONATE 50 MCG
SPRAY, SUSPENSION (ML) NASAL
Qty: 48 G | OUTPATIENT
Start: 2023-07-25

## 2023-07-25 RX ORDER — FLUTICASONE PROPIONATE 50 MCG
2 SPRAY, SUSPENSION (ML) NASAL DAILY
Qty: 9.9 ML | Refills: 0 | Status: SHIPPED | OUTPATIENT
Start: 2023-07-25

## 2023-07-25 NOTE — PROGRESS NOTES
"  Hay Serna M.D.  Internal Medicine  [facility]  [address], [address]  [place] [address]  [phone]      Chief Complaint  Visual Disturbances     SUBJECTIVE    History of Present Illness    Sirisha Auguste is a 88 y.o. female with HTN, migraines who presents to the office today as an established patient that last saw me on 4/26/2023.      She had a visit with one of my partners 7/17 for presyncopal possibly syncope symptoms. States previous 3 episodes she could not see, she was retching, \"legs were like spaghetti.\" States this was not like her normal migraine. She was advised to get a medical alert button and to stay with someone if possible. She is to get a holter monitor. She is advised to try switching from triptan to nurtec given ischemic changes on MRI. STAT CT head was negative for acute findings. Ordered repeat MRI which is scheduled.  She will be seen immediately if any recurrence of symptoms. She was advised a daily ASA and to f/u with neurology as scheduled.    Nurtec has not worked. She actually took one of her Imitrex for migraine because Nurtec did not work. She understand that triptans are contraindicated in stroke history. She has tried Beta Blockers in the past for Migraines without much relief.      She is scheduled for repeat MRI August 17th. Holter monitor has not been scheduled    Knot on neck comes and goes and is tender and firm.     Uses volteren BID for arthritis as well as tylenol without relief of symptoms. They are wondering if Tylenol caused recent episodes.      She has felt bad for 2 weeks since this happened. She feels more week and tired in general.     Review of Systems    Allergies   Allergen Reactions    Atorvastatin Myalgia    Sulfa Antibiotics Other (See Comments)     unknown    Contrast Dye (Echo Or Unknown Ct/Mr) Nausea And Vomiting        Outpatient Medications Marked as Taking for the 7/25/23 encounter (Office Visit) with Hay Serna MD "   Medication Sig Dispense Refill    ALPRAZolam (XANAX) 0.5 MG tablet Take 1 tablet by mouth At Night As Needed for Anxiety or Sleep. 30 tablet 0    diclofenac (VOLTAREN) 1 % gel gel Apply 4 g topically 4 (Four) Times a Day As Needed.      esomeprazole (nexIUM) 40 MG capsule Take 1 capsule by mouth Every Morning Before Breakfast. 90 capsule 3    estradiol (ESTRACE) 0.1 MG/GM vaginal cream Insert 2 g into the vagina Daily.      ezetimibe-simvastatin (VYTORIN) 10-20 MG per tablet Take 1 tablet by mouth Every Night. 90 tablet 3    metoprolol succinate XL (TOPROL-XL) 50 MG 24 hr tablet Take 1 tablet by mouth Daily. 90 tablet 2    montelukast (SINGULAIR) 10 MG tablet Take 1 tablet by mouth Every Night. 90 tablet 3    Probiotic Product (PROBIOTIC PO) Take  by mouth Every Other Day.      saccharomyces boulardii (Florastor) 250 MG capsule Take 1 capsule by mouth 2 (Two) Times a Day. 90 capsule 2    VITAMIN D, CHOLECALCIFEROL, PO Take 2,000 Units by mouth Daily.      [DISCONTINUED] Rimegepant Sulfate (Nurtec) 75 MG tablet dispersible tablet Take 1 tablet by mouth 1 (One) Time As Needed (migrain) for up to 1 dose. 2 tablet 0    [DISCONTINUED] Rimegepant Sulfate (Nurtec) 75 MG tablet dispersible tablet Take 1 tablet by mouth 1 (One) Time As Needed (migraine) for up to 1 dose. Indications: Migraine Headache, history cerbral infarct 8 tablet 0        Past Medical History:   Diagnosis Date    Anxiety 1985+/-    Arthritis     Breast cyst     GERD (gastroesophageal reflux disease)     Hyperglycemia     Hyperlipidemia     Hypertension     Low back pain     Migraines     Murmur     Osteopenia     RBBB     Urinary tract infection     many times over years     Past Surgical History:   Procedure Laterality Date    BLADDER SURGERY      BREAST CYST ASPIRATION      COLONOSCOPY N/A 7/14/2022    Procedure: COLONOSCOPY TO 20CM WITH COLD BIOPSIES;  Surgeon: Henrik Gaston MD;  Location: [facility];  Service: Gastroenterology;  Laterality:  "N/A;  PRE- ABNORMAL CT, BLACK TARRY STOOL, ABDOMINAL PAIN  POST- COLITIS    ENDOSCOPY N/A 7/12/2022    Procedure: ESOPHAGOGASTRODUODENOSCOPY WITH COLD BIOPSIES;  Surgeon: Yoselyn Gates MD;  Location: [facility] ENDOSCOPY;  Service: Gastroenterology;  Laterality: N/A;  PRE- DYSPEPSIA, MELENA  POST- MILD GASTRITIS, SMALL HIATAL HERNIA    HYSTERECTOMY      OOPHORECTOMY      SIGMOIDOSCOPY N/A 5/8/2023    Procedure: SIGMOIDOSCOPY FLEXIBLE TO LEFT TRANSVERSE COLON;  Surgeon: Yoselyn Gates MD;  Location: [facility] ENDOSCOPY;  Service: Gastroenterology;  Laterality: N/A;  PRE- ABNORMAL CT OF COLON, FOLLOW-UP COLITIS  POST- HEMORRHOIDS     Family History   Problem Relation Age of Onset    Cancer Sister         breast    Breast cancer Sister     Cancer Maternal Aunt         breast    Breast cancer Maternal Aunt     Anxiety disorder Mother         no panic attacks known    Arthritis Mother         very bad case    reports that she quit smoking about 63 years ago. Her smoking use included cigarettes. She started smoking about 63 years ago. She has a 0.13 pack-year smoking history. She has never used smokeless tobacco. She reports that she does not drink alcohol and does not use drugs.    OBJECTIVE    Vital Signs:   /72   Pulse 68   Ht 162.6 cm (64.02\")   Wt 55.4 kg (122 lb 3.2 oz)   SpO2 97%   BMI 20.97 kg/m²     Physical Exam  Constitutional:       Appearance: Normal appearance. She is normal weight.   Cardiovascular:      Rate and Rhythm: Normal rate and regular rhythm.      Heart sounds: Normal heart sounds. No murmur heard.  Pulmonary:      Effort: Pulmonary effort is normal.      Breath sounds: Normal breath sounds.   Musculoskeletal:      Right lower leg: No edema.      Left lower leg: No edema.   Skin:     General: Skin is warm and dry.   Neurological:      Mental Status: She is alert.   Psychiatric:         Mood and Affect: Mood normal.         Behavior: Behavior normal.         Thought Content: Thought " content normal.          The following data was reviewed by: Hay Serna MD on 07/25/2023:  Common labs          1/26/2023    14:39 7/17/2023    10:40   Common Labs   Glucose 103  59    BUN 18  23    Creatinine 0.85  1.01    Sodium 138  138    Potassium 4.4  4.3    Chloride 102  102    Calcium 10.7  10.7    Total Protein 7.2  6.9    Albumin 4.7  4.6    Total Bilirubin 0.8  0.8    Alkaline Phosphatase 67  79    AST (SGOT) 25  17    ALT (SGPT) 10  10    WBC 6.06  6.59    Hemoglobin 13.2  13.5    Hematocrit 40.3  40.8    Platelets 190  186    Total Cholesterol 167  145    Triglycerides 118  120    HDL Cholesterol 65  52    LDL Cholesterol  81  72    Hemoglobin A1C 5.60       Data reviewed : Recent CT and Note from 7/17               ASSESSMENT & PLAN     Diagnoses and all orders for this visit:    1. Chronic migraine without aura without status migrainosus, not intractable (Primary)  -     ubrogepant (Ubrelvy) 50 MG tablet; Take 1 tablet by mouth 1 (One) Time As Needed (migraine). Can take second dose in 2 hours if no relief.  Dispense: 3 tablet; Refill: 0    2. Chronic fatigue    3. Seasonal allergies  -     fluticasone (FLONASE) 50 MCG/ACT nasal spray; 2 sprays into the nostril(s) as directed by provider Daily.  Dispense: 9.9 mL; Refill: 0    87 yo with recent presyncopal episodes and worsening migraines. Nurtec is not helping with her migraines so we will switch to Ubrelvy. Discussed to avoid Triptans. She has visit with neurology and cardiology pending and MRI and Holter pending.       Health Maintenance Due   Topic Date Due    DXA SCAN  Never done    TDAP/TD VACCINES (1 - Tdap) Never done    ZOSTER VACCINE (3 of 3) 07/23/2019    COVID-19 Vaccine (6 - Pfizer series) 02/01/2023        Follow Up  Return in about 3 months (around 10/25/2023) for Recheck.    Patient/family had no further questions at this time and verbalized understanding of the plan discussed today.

## 2023-07-26 ENCOUNTER — TELEPHONE (OUTPATIENT)
Dept: INTERNAL MEDICINE | Facility: CLINIC | Age: 88
End: 2023-07-26
Payer: MEDICARE

## 2023-07-26 DIAGNOSIS — R42 POSTURAL DIZZINESS WITH PRESYNCOPE: Primary | ICD-10-CM

## 2023-07-26 DIAGNOSIS — R55 POSTURAL DIZZINESS WITH PRESYNCOPE: Primary | ICD-10-CM

## 2023-07-26 NOTE — TELEPHONE ENCOUNTER
Cardiology called regarding holter monitor order that Dr. Jacobson put in. Rola from cardiology stated that her insurance is not covering the 72 hour monitor. Rola said insurance will cover a 24-48 hour holter monitor or a 30 day event monitor.    Can the provider please update or put in new order for a holter monitor. Either 24/48 hour or a 30 day event monitor

## 2023-08-02 ENCOUNTER — OFFICE VISIT (OUTPATIENT)
Dept: NEUROLOGY | Facility: CLINIC | Age: 88
End: 2023-08-02
Payer: MEDICARE

## 2023-08-02 VITALS
DIASTOLIC BLOOD PRESSURE: 78 MMHG | HEART RATE: 63 BPM | BODY MASS INDEX: 20.96 KG/M2 | HEIGHT: 64 IN | SYSTOLIC BLOOD PRESSURE: 138 MMHG | OXYGEN SATURATION: 98 %

## 2023-08-02 DIAGNOSIS — Z86.73 HISTORY OF STROKE: ICD-10-CM

## 2023-08-02 DIAGNOSIS — R42 DIZZINESS: Primary | ICD-10-CM

## 2023-08-02 PROCEDURE — 1159F MED LIST DOCD IN RCRD: CPT | Performed by: PSYCHIATRY & NEUROLOGY

## 2023-08-02 PROCEDURE — 1160F RVW MEDS BY RX/DR IN RCRD: CPT | Performed by: PSYCHIATRY & NEUROLOGY

## 2023-08-02 PROCEDURE — 99204 OFFICE O/P NEW MOD 45 MIN: CPT | Performed by: PSYCHIATRY & NEUROLOGY

## 2023-08-02 RX ORDER — SUMATRIPTAN 100 MG/1
100 TABLET, FILM COATED ORAL
COMMUNITY
Start: 2023-07-30

## 2023-08-03 ENCOUNTER — OFFICE VISIT (OUTPATIENT)
Dept: CARDIOLOGY | Facility: CLINIC | Age: 88
End: 2023-08-03
Payer: MEDICARE

## 2023-08-03 VITALS
SYSTOLIC BLOOD PRESSURE: 148 MMHG | WEIGHT: 124 LBS | HEART RATE: 55 BPM | BODY MASS INDEX: 21.17 KG/M2 | HEIGHT: 64 IN | DIASTOLIC BLOOD PRESSURE: 90 MMHG

## 2023-08-03 DIAGNOSIS — I48.0 PAROXYSMAL ATRIAL FIBRILLATION: Primary | ICD-10-CM

## 2023-08-03 DIAGNOSIS — Z86.73 HISTORY OF STROKE: ICD-10-CM

## 2023-08-03 PROBLEM — I10 ESSENTIAL HYPERTENSION: Status: ACTIVE | Noted: 2023-08-03

## 2023-08-03 PROBLEM — R78.81 BACTEREMIA: Status: RESOLVED | Noted: 2022-07-13 | Resolved: 2023-08-03

## 2023-08-03 PROBLEM — M54.6 ACUTE THORACIC BACK PAIN: Status: RESOLVED | Noted: 2017-02-09 | Resolved: 2023-08-03

## 2023-08-03 PROBLEM — K52.9 COLITIS: Status: RESOLVED | Noted: 2023-02-28 | Resolved: 2023-08-03

## 2023-08-03 PROBLEM — R10.30 LOWER ABDOMINAL PAIN: Status: RESOLVED | Noted: 2023-02-28 | Resolved: 2023-08-03

## 2023-08-03 PROBLEM — A04.4 E. COLI COLITIS: Status: RESOLVED | Noted: 2022-07-14 | Resolved: 2023-08-03

## 2023-08-03 PROBLEM — T14.8XXA HEMATOMA: Status: RESOLVED | Noted: 2019-06-03 | Resolved: 2023-08-03

## 2023-08-03 PROCEDURE — 99214 OFFICE O/P EST MOD 30 MIN: CPT | Performed by: INTERNAL MEDICINE

## 2023-08-03 PROCEDURE — 1160F RVW MEDS BY RX/DR IN RCRD: CPT | Performed by: INTERNAL MEDICINE

## 2023-08-03 PROCEDURE — 1159F MED LIST DOCD IN RCRD: CPT | Performed by: INTERNAL MEDICINE

## 2023-08-03 PROCEDURE — 93000 ELECTROCARDIOGRAM COMPLETE: CPT | Performed by: INTERNAL MEDICINE

## 2023-08-10 ENCOUNTER — PATIENT ROUNDING (BHMG ONLY) (OUTPATIENT)
Dept: NEUROLOGY | Facility: CLINIC | Age: 88
End: 2023-08-10
Payer: MEDICARE

## 2023-08-10 ENCOUNTER — PATIENT MESSAGE (OUTPATIENT)
Dept: CARDIOLOGY | Facility: CLINIC | Age: 88
End: 2023-08-10

## 2023-08-11 NOTE — TELEPHONE ENCOUNTER
Confirming that Franchesca and I discussed this and she called the patient.  Unfortunately, have to hold AC due to significant bleeding, but there is stroke risk. I hope that 2days will suffice

## 2023-08-11 NOTE — TELEPHONE ENCOUNTER
Consulted verbally with Dr. Magallanes, she advised pt to hold her Eliquis for the next 2 days and then update us to inform the office if this has helped.  Pt was advised of her risk for stroke while holding the medication and to seek immediate attention if she develops any neurological/ stroke-like symptoms.  Pt verbalized understanding.

## 2023-08-18 ENCOUNTER — HOSPITAL ENCOUNTER (OUTPATIENT)
Facility: HOSPITAL | Age: 88
Discharge: HOME | End: 2023-08-18
Admitting: INTERNAL MEDICINE
Payer: MEDICARE

## 2023-08-18 DIAGNOSIS — H53.9 VISUAL DISTURBANCE: ICD-10-CM

## 2023-08-18 DIAGNOSIS — G43.109 MIGRAINE WITH AURA AND WITHOUT STATUS MIGRAINOSUS, NOT INTRACTABLE: ICD-10-CM

## 2023-08-18 DIAGNOSIS — R42 POSTURAL DIZZINESS WITH PRESYNCOPE: ICD-10-CM

## 2023-08-18 DIAGNOSIS — I63.9 CEREBRAL INFARCTION, UNSPECIFIED MECHANISM: ICD-10-CM

## 2023-08-18 DIAGNOSIS — R55 POSTURAL DIZZINESS WITH PRESYNCOPE: ICD-10-CM

## 2023-08-18 PROCEDURE — 70551 MRI BRAIN STEM W/O DYE: CPT

## 2023-08-24 ENCOUNTER — TELEPHONE (OUTPATIENT)
Dept: CARDIOLOGY | Facility: CLINIC | Age: 88
End: 2023-08-24
Payer: MEDICARE

## 2023-08-24 RX ORDER — SUMATRIPTAN 100 MG/1
TABLET, FILM COATED ORAL
Qty: 27 TABLET | OUTPATIENT
Start: 2023-08-24

## 2023-08-24 NOTE — TELEPHONE ENCOUNTER
Called Sirisha Auguste for additional information and spoke with her daughter, Marcos Aiken (ok per verbal JOSÉ).  Marcos stated that patient did not have any symptoms while wearing the monitor.    Please let me know how you would like to proceed.    Thank you,  Radha TAMAYO RN  Triage Nurse [facility]   10:59 EDT

## 2023-08-24 NOTE — TELEPHONE ENCOUNTER
That is good news.  She does have some ectopic beats but no true atrial fibrillation.  I would like her to remain on her current medications, including apixaban, and see TK in follow-up in 6 months.    Clay ren

## 2023-08-24 NOTE — TELEPHONE ENCOUNTER
----- Message from Rell Magallanes MD sent at 8/24/2023  9:48 AM EDT -----  Will you see if she had any symptoms while she was wearing this?    mikal

## 2023-08-24 NOTE — TELEPHONE ENCOUNTER
Pts daughter informed    ----- Message from Gerri Jacobson MD sent at 8/22/2023  4:48 PM EDT -----  MRI brain with no acute findings. Patient should f/u w/ dr. Serna routinely.

## 2023-08-24 NOTE — TELEPHONE ENCOUNTER
Reviewed results and recommendations with Marcos and she verbalized understanding of results and recommendations.  Marcos declined to make appointment as she would like to discuss dates/times with patient.  Marcos stated she will call back at a later time to schedule 6 month f/u.    Thank you,  Radha TAMAYO RN  Triage Nurse [facility]   14:06 EDT

## 2023-08-29 RX ORDER — SUMATRIPTAN 100 MG/1
100 TABLET, FILM COATED ORAL
OUTPATIENT
Start: 2023-08-29

## 2023-09-02 ENCOUNTER — HOSPITAL ENCOUNTER (OUTPATIENT)
Dept: MRI IMAGING | Facility: HOSPITAL | Age: 88
Discharge: HOME | End: 2023-09-02
Payer: MEDICARE

## 2023-09-02 DIAGNOSIS — Z86.73 HISTORY OF STROKE: ICD-10-CM

## 2023-09-02 DIAGNOSIS — R42 DIZZINESS: ICD-10-CM

## 2023-09-02 PROCEDURE — 70547 MR ANGIOGRAPHY NECK W/O DYE: CPT

## 2023-09-02 PROCEDURE — 70544 MR ANGIOGRAPHY HEAD W/O DYE: CPT

## 2023-09-03 DIAGNOSIS — I48.20 CHRONIC ATRIAL FIBRILLATION WITH RAPID VENTRICULAR RESPONSE: ICD-10-CM

## 2023-09-05 RX ORDER — METOPROLOL SUCCINATE 50 MG/1
TABLET, EXTENDED RELEASE ORAL
Qty: 90 TABLET | Refills: 2 | Status: SHIPPED | OUTPATIENT
Start: 2023-09-05

## 2023-09-06 NOTE — PROGRESS NOTES
I reviewed her brain imaging.  I noticed that there is not really any significant intracranial atherosclerosis.  At this point, I do think that oral anticoagulation is the best choice going forward, as long as the benefits outweigh the risks.    Scheduling, please arrange a follow-up in February with TK.    CC'd to Parmjit Serna and Ramiro.

## 2023-11-03 ENCOUNTER — OFFICE VISIT (OUTPATIENT)
Dept: INTERNAL MEDICINE | Facility: CLINIC | Age: 88
End: 2023-11-03
Payer: MEDICARE

## 2023-11-03 VITALS
WEIGHT: 124 LBS | HEART RATE: 68 BPM | SYSTOLIC BLOOD PRESSURE: 126 MMHG | HEIGHT: 64 IN | OXYGEN SATURATION: 97 % | DIASTOLIC BLOOD PRESSURE: 70 MMHG | BODY MASS INDEX: 21.17 KG/M2

## 2023-11-03 DIAGNOSIS — Z79.899 HIGH RISK MEDICATION USE: ICD-10-CM

## 2023-11-03 DIAGNOSIS — G43.709 CHRONIC MIGRAINE WITHOUT AURA WITHOUT STATUS MIGRAINOSUS, NOT INTRACTABLE: ICD-10-CM

## 2023-11-03 DIAGNOSIS — F41.9 ANXIETY: ICD-10-CM

## 2023-11-03 DIAGNOSIS — R35.0 FREQUENCY OF URINATION: Primary | ICD-10-CM

## 2023-11-03 DIAGNOSIS — N30.01 ACUTE CYSTITIS WITH HEMATURIA: ICD-10-CM

## 2023-11-03 LAB
BILIRUB BLD-MCNC: NEGATIVE MG/DL
CLARITY, POC: CLEAR
COLOR UR: ABNORMAL
EXPIRATION DATE: ABNORMAL
GLUCOSE UR STRIP-MCNC: NEGATIVE MG/DL
KETONES UR QL: NEGATIVE
LEUKOCYTE EST, POC: ABNORMAL
Lab: ABNORMAL
NITRITE UR-MCNC: POSITIVE MG/ML
PH UR: 5.5 [PH] (ref 5–8)
PROT UR STRIP-MCNC: ABNORMAL MG/DL
RBC # UR STRIP: ABNORMAL /UL
SP GR UR: 1.01 (ref 1–1.03)
UROBILINOGEN UR QL: ABNORMAL

## 2023-11-03 PROCEDURE — 99214 OFFICE O/P EST MOD 30 MIN: CPT | Performed by: STUDENT IN AN ORGANIZED HEALTH CARE EDUCATION/TRAINING PROGRAM

## 2023-11-03 PROCEDURE — 81003 URINALYSIS AUTO W/O SCOPE: CPT | Performed by: STUDENT IN AN ORGANIZED HEALTH CARE EDUCATION/TRAINING PROGRAM

## 2023-11-03 RX ORDER — SUMATRIPTAN 100 MG/1
100 TABLET, FILM COATED ORAL ONCE AS NEEDED
Qty: 9 TABLET | Refills: 0 | Status: SHIPPED | OUTPATIENT
Start: 2023-11-03

## 2023-11-03 RX ORDER — NITROFURANTOIN 25; 75 MG/1; MG/1
100 CAPSULE ORAL 2 TIMES DAILY
Qty: 14 CAPSULE | Refills: 0 | Status: SHIPPED | OUTPATIENT
Start: 2023-11-03 | End: 2023-11-10

## 2023-11-03 RX ORDER — ALPRAZOLAM 0.5 MG/1
0.5 TABLET ORAL NIGHTLY PRN
Qty: 30 TABLET | Refills: 0 | Status: SHIPPED | OUTPATIENT
Start: 2023-11-03

## 2023-11-03 NOTE — PROGRESS NOTES
"  Hay Serna M.D.  Internal Medicine  [facility]  [address], [address]  [place] [address]  [phone]      Chief Complaint  Follow-up (3 mth F/U /) and Urinary Frequency (Burning with urination /) and UTI    SUBJECTIVE    History of Present Illness    Sirisha Auguste is a 88 y.o. female with migraines, anxiety, GERD, hyperlipidemia, A-fib who presents to the office today as an established patient that last saw me on 7/25/2023.     She reports UTI symptoms since the morning of November 1.    She had an essentially normal sigmoidoscopy with Dr. Leonard in May but she has not followed up with her yet.    She has been working with neurology for dizziness.  She had an MRI angiogram of her head and neck that did not show significant stenosis.  She was referred to vestibular rehab.    Taking pyridium and \"antibacterial AZO\". Urine is red. Has \"urine flooding\". Urine burns. No fevers. Has some hesitancy    Still struggles with loose stools. Watching fiber and bean intake. Also has hemorrhoids and constipation. Gets diarrhea if she eats too much fiber.     Ubrelvey and Nurtec did not help. She wants Sumatripatn again.     Anxiety-on Xanax. Takes one-quarter to one half of a 0.5 mg tab at night. Tolerating well.     Singulair and claritin do not work well for allergies. Interested in Xyzal.       Stopped eliqis due to bleeding. Just baby ASA.     Review of Systems    Allergies   Allergen Reactions    Atorvastatin Myalgia    Sulfa Antibiotics Other (See Comments)     unknown    Contrast Dye (Echo Or Unknown Ct/Mr) Nausea And Vomiting     Patients daughter reports the allergy to MRI IV contrast which happened many years ago , in the early 90's, which caused patient to be hospitalized for an 3 extra days. (Patient and her daughter would consider contrast allergy premedication in the future as of 8/18/2023)         Outpatient Medications Marked as Taking for the 11/3/23 encounter (Office Visit) " with Hay Serna MD   Medication Sig Dispense Refill    ALPRAZolam (XANAX) 0.5 MG tablet Take 1 tablet by mouth At Night As Needed for Anxiety or Sleep. 30 tablet 0    apixaban (ELIQUIS) 2.5 MG tablet tablet Take 1 tablet by mouth 2 (Two) Times a Day. 60 tablet 1    diclofenac (VOLTAREN) 1 % gel gel Apply 4 g topically 4 (Four) Times a Day As Needed.      esomeprazole (nexIUM) 40 MG capsule Take 1 capsule by mouth Every Morning Before Breakfast. 90 capsule 3    estradiol (ESTRACE) 0.1 MG/GM vaginal cream Insert 2 g into the vagina Daily.      ezetimibe-simvastatin (VYTORIN) 10-20 MG per tablet Take 1 tablet by mouth Every Night. 90 tablet 3    metoprolol succinate XL (TOPROL-XL) 50 MG 24 hr tablet TAKE 1 TABLET EVERY DAY 90 tablet 2    montelukast (SINGULAIR) 10 MG tablet Take 1 tablet by mouth Every Night. 90 tablet 3    Probiotic Product (PROBIOTIC PO) Take  by mouth Every Other Day.      saccharomyces boulardii (Florastor) 250 MG capsule Take 1 capsule by mouth 2 (Two) Times a Day. 90 capsule 2    SUMAtriptan (IMITREX) 100 MG tablet Take 1 tablet by mouth 1 (One) Time As Needed for Migraine for up to 9 doses. 9 tablet 0    VITAMIN D, CHOLECALCIFEROL, PO Take 2,000 Units by mouth Daily.      [DISCONTINUED] ALPRAZolam (XANAX) 0.5 MG tablet Take 1 tablet by mouth At Night As Needed for Anxiety or Sleep. 30 tablet 0    [DISCONTINUED] SUMAtriptan (IMITREX) 100 MG tablet Take 1 tablet by mouth.          Past Medical History:   Diagnosis Date    Anxiety 1985+/-    Arthritis     Bacteremia 07/13/2022    Breast cyst     E. coli colitis 07/14/2022    GERD (gastroesophageal reflux disease)     Hyperglycemia     Hyperlipidemia     Hypertension     Low back pain     Migraines     Osteopenia     RBBB     Recurrent vertigo 05/08/2017    Urinary tract infection     many times over years     Past Surgical History:   Procedure Laterality Date    BLADDER SURGERY      BREAST CYST ASPIRATION      COLONOSCOPY N/A 7/14/2022     "Procedure: COLONOSCOPY TO 20CM WITH COLD BIOPSIES;  Surgeon: Henrik Gaston MD;  Location: [facility] ENDOSCOPY;  Service: Gastroenterology;  Laterality: N/A;  PRE- ABNORMAL CT, BLACK TARRY STOOL, ABDOMINAL PAIN  POST- COLITIS    ENDOSCOPY N/A 7/12/2022    Procedure: ESOPHAGOGASTRODUODENOSCOPY WITH COLD BIOPSIES;  Surgeon: Yoselyn Gates MD;  Location: [facility] ENDOSCOPY;  Service: Gastroenterology;  Laterality: N/A;  PRE- DYSPEPSIA, MELENA  POST- MILD GASTRITIS, SMALL HIATAL HERNIA    HYSTERECTOMY      OOPHORECTOMY      SIGMOIDOSCOPY N/A 5/8/2023    Procedure: SIGMOIDOSCOPY FLEXIBLE TO LEFT TRANSVERSE COLON;  Surgeon: Yoselyn Gates MD;  Location: [facility] ENDOSCOPY;  Service: Gastroenterology;  Laterality: N/A;  PRE- ABNORMAL CT OF COLON, FOLLOW-UP COLITIS  POST- HEMORRHOIDS     Family History   Problem Relation Age of Onset    Cancer Sister         breast    Breast cancer Sister     Cancer Maternal Aunt         breast    Breast cancer Maternal Aunt     Anxiety disorder Mother         no panic attacks known    Arthritis Mother         very bad case    reports that she quit smoking about 63 years ago. Her smoking use included cigarettes. She started smoking about 63 years ago. She has a 0.13 pack-year smoking history. She has never used smokeless tobacco. She reports that she does not drink alcohol and does not use drugs.    OBJECTIVE    Vital Signs:   /70   Pulse 68   Ht 162.6 cm (64.02\")   Wt 56.2 kg (124 lb)   SpO2 97%   BMI 21.27 kg/m²     Physical Exam  Constitutional:       Appearance: Normal appearance. She is normal weight.   Cardiovascular:      Rate and Rhythm: Normal rate and regular rhythm.      Heart sounds: Normal heart sounds. No murmur heard.  Pulmonary:      Effort: Pulmonary effort is normal.      Breath sounds: Normal breath sounds.   Abdominal:      General: Abdomen is flat. There is no distension.      Palpations: Abdomen is soft.      Tenderness: There is no abdominal tenderness. "   Skin:     General: Skin is warm and dry.   Neurological:      Mental Status: She is alert.   Psychiatric:         Mood and Affect: Mood normal.         Behavior: Behavior normal.         Thought Content: Thought content normal.            The following data was reviewed by: Hay Serna MD on 11/03/2023:  Common labs          1/26/2023    14:39 7/17/2023    10:40   Common Labs   Glucose 103  59    BUN 18  23    Creatinine 0.85  1.01    Sodium 138  138    Potassium 4.4  4.3    Chloride 102  102    Calcium 10.7  10.7    Total Protein 7.2  6.9    Albumin 4.7  4.6    Total Bilirubin 0.8  0.8    Alkaline Phosphatase 67  79    AST (SGOT) 25  17    ALT (SGPT) 10  10    WBC 6.06  6.59    Hemoglobin 13.2  13.5    Hematocrit 40.3  40.8    Platelets 190  186    Total Cholesterol 167  145    Triglycerides 118  120    HDL Cholesterol 65  52    LDL Cholesterol  81  72    Hemoglobin A1C 5.60       Data reviewed : Cardiology neurology notes            ASSESSMENT & PLAN     Diagnoses and all orders for this visit:    1. Frequency of urination (Primary)  -     POCT urinalysis dipstick, automated  -     Urine Culture - Urine, Urine, Clean Catch  -     nitrofurantoin, macrocrystal-monohydrate, (Macrobid) 100 MG capsule; Take 1 capsule by mouth 2 (Two) Times a Day for 7 days.  Dispense: 14 capsule; Refill: 0    2. Acute cystitis with hematuria  -     Urine Culture - Urine, Urine, Clean Catch  -     nitrofurantoin, macrocrystal-monohydrate, (Macrobid) 100 MG capsule; Take 1 capsule by mouth 2 (Two) Times a Day for 7 days.  Dispense: 14 capsule; Refill: 0    3. Chronic migraine without aura without status migrainosus, not intractable  -     SUMAtriptan (IMITREX) 100 MG tablet; Take 1 tablet by mouth 1 (One) Time As Needed for Migraine for up to 9 doses.  Dispense: 9 tablet; Refill: 0    4. Anxiety  -     Urine Drug Screen - Urine, Clean Catch  -     ALPRAZolam (XANAX) 0.5 MG tablet; Take 1 tablet by mouth At Night As Needed for Anxiety  "or Sleep.  Dispense: 30 tablet; Refill: 0    5. High risk medication use  -     Urine Drug Screen - Urine, Clean Catch    Follow up with Dr. Gates for diarrhea.     Accepts increased stroke risk with Triptan. States she \"would rather die\". Encouraged her to explore alternatives with neurology.     Bryn reviewed in office today and appropriate. Discussed fall risk with benzodiazepines in older adults.       Treating UTI with macrobid. Encouraged adequate hydration. Patient counseled to seek medical care for new or worsening symptoms or failure of symptoms to improve.       Health Maintenance Due   Topic Date Due    DXA SCAN  Never done    TDAP/TD VACCINES (1 - Tdap) Never done    ZOSTER VACCINE (3 of 3) 07/23/2019        Follow Up  Return in about 6 months (around 5/3/2024) for Medicare Wellness.    Patient/family had no further questions at this time and verbalized understanding of the plan discussed today.   "

## 2023-11-08 DIAGNOSIS — K21.9 GASTROESOPHAGEAL REFLUX DISEASE, UNSPECIFIED WHETHER ESOPHAGITIS PRESENT: ICD-10-CM

## 2023-11-08 RX ORDER — ESOMEPRAZOLE MAGNESIUM 40 MG/1
40 CAPSULE, DELAYED RELEASE ORAL
Qty: 90 CAPSULE | Refills: 10 | Status: SHIPPED | OUTPATIENT
Start: 2023-11-08

## 2023-11-27 DIAGNOSIS — G43.709 CHRONIC MIGRAINE WITHOUT AURA WITHOUT STATUS MIGRAINOSUS, NOT INTRACTABLE: ICD-10-CM

## 2023-11-27 RX ORDER — SUMATRIPTAN 100 MG/1
100 TABLET, FILM COATED ORAL ONCE AS NEEDED
Qty: 9 TABLET | Refills: 3 | Status: SHIPPED | OUTPATIENT
Start: 2023-11-27

## 2023-11-28 RX ORDER — ESTRADIOL 0.1 MG/G
2 CREAM VAGINAL DAILY
Qty: 42.5 G | Refills: 2 | Status: SHIPPED | OUTPATIENT
Start: 2023-11-28 | End: 2023-11-29

## 2023-11-29 ENCOUNTER — TELEPHONE (OUTPATIENT)
Dept: INTERNAL MEDICINE | Facility: CLINIC | Age: 88
End: 2023-11-29
Payer: MEDICARE

## 2023-11-29 RX ORDER — ESTRADIOL 0.1 MG/G
1 CREAM VAGINAL DAILY
Qty: 42.5 G | Refills: 2 | Status: SHIPPED | OUTPATIENT
Start: 2023-11-29

## 2023-11-29 RX ORDER — EZETIMIBE AND SIMVASTATIN 10; 20 MG/1; MG/1
1 TABLET ORAL NIGHTLY
Qty: 90 TABLET | Refills: 3 | Status: SHIPPED | OUTPATIENT
Start: 2023-11-29

## 2023-11-29 NOTE — TELEPHONE ENCOUNTER
Caller: [id number] - [place], [place] - [address] - [phone]  - [phone] FX    Relationship to patient: Pharmacy    Best call back number: [phone]    Patient is needing: PHARMACY HAS QUESTIONS ABOUT THE DOSAGE DIRECTIONS FOR THE PATIENTS    estradiol (ESTRACE) 0.1 MG/GM vaginal cream     PLEASE REACH OUT TO CLARIFY.

## 2023-12-15 ENCOUNTER — TELEPHONE (OUTPATIENT)
Dept: INTERNAL MEDICINE | Facility: CLINIC | Age: 88
End: 2023-12-15
Payer: MEDICARE

## 2023-12-15 NOTE — TELEPHONE ENCOUNTER
CALLED PATIENT'S DAUGHTER, IT'S HARD TO GET HER OUT OF THE HOUSE AND TO AN URGENT CARE.  SHE SCHEDULED AN APPOINTMENT FOR TUESDAY.

## 2023-12-15 NOTE — TELEPHONE ENCOUNTER
Caller: Marcos Aiken    Relationship: Emergency Contact    Best call back number: [phone]    What medication are you requesting:     What are your current symptoms: URGENCY AND BURNING WITH URINATION    How long have you been experiencing symptoms: 1 TODAY    Have you had these symptoms before:    [x] Yes  [] No    Have you been treated for these symptoms before:   [x] Yes  [] No    If a prescription is needed, what is your preferred pharmacy and phone number: [id number] [address] AT [facility] - [phone] [facility] [phone]      Additional notes: PATIENTS DAUGHTER WOULD LIKE A CALL IF MEDICATION HAS BEEN SENT OR NOT.    PLEASE CALL.

## 2024-01-20 DIAGNOSIS — F41.9 ANXIETY: ICD-10-CM

## 2024-01-23 RX ORDER — ALPRAZOLAM 0.5 MG/1
0.5 TABLET ORAL NIGHTLY PRN
Qty: 30 TABLET | Refills: 0 | Status: SHIPPED | OUTPATIENT
Start: 2024-01-23

## 2024-03-04 DIAGNOSIS — G43.709 CHRONIC MIGRAINE WITHOUT AURA WITHOUT STATUS MIGRAINOSUS, NOT INTRACTABLE: ICD-10-CM

## 2024-03-05 RX ORDER — SUMATRIPTAN 100 MG/1
TABLET, FILM COATED ORAL
Qty: 9 TABLET | Refills: 11 | Status: SHIPPED | OUTPATIENT
Start: 2024-03-05

## 2024-05-06 ENCOUNTER — TELEPHONE (OUTPATIENT)
Dept: NEUROLOGY | Facility: CLINIC | Age: 89
End: 2024-05-06
Payer: MEDICARE

## 2024-05-10 DIAGNOSIS — F41.9 ANXIETY: ICD-10-CM

## 2024-05-13 RX ORDER — ALPRAZOLAM 0.5 MG/1
TABLET ORAL
Qty: 30 TABLET | Refills: 0 | Status: SHIPPED | OUTPATIENT
Start: 2024-05-13

## 2024-05-14 ENCOUNTER — OFFICE VISIT (OUTPATIENT)
Dept: NEUROLOGY | Facility: CLINIC | Age: 89
End: 2024-05-14
Payer: MEDICARE

## 2024-05-14 ENCOUNTER — PATIENT MESSAGE (OUTPATIENT)
Dept: NEUROLOGY | Facility: CLINIC | Age: 89
End: 2024-05-14

## 2024-05-14 VITALS
BODY MASS INDEX: 21 KG/M2 | WEIGHT: 123 LBS | DIASTOLIC BLOOD PRESSURE: 78 MMHG | HEART RATE: 52 BPM | HEIGHT: 64 IN | SYSTOLIC BLOOD PRESSURE: 108 MMHG | OXYGEN SATURATION: 98 %

## 2024-05-14 DIAGNOSIS — R42 DIZZINESS: Primary | ICD-10-CM

## 2024-05-14 PROCEDURE — 1159F MED LIST DOCD IN RCRD: CPT | Performed by: PSYCHIATRY & NEUROLOGY

## 2024-05-14 PROCEDURE — 1160F RVW MEDS BY RX/DR IN RCRD: CPT | Performed by: PSYCHIATRY & NEUROLOGY

## 2024-05-14 PROCEDURE — 99214 OFFICE O/P EST MOD 30 MIN: CPT | Performed by: PSYCHIATRY & NEUROLOGY

## 2024-05-14 NOTE — PROGRESS NOTES
"Chief Complaint   Patient presents with    Dizziness    Hx Stroke       Patient ID: Sirisha Auguste is a 88 y.o. female.    HPI: I had the pleasure of seeing your patient again today.  As you may know she is an 88-year-old female here for the management of dizziness.  She has a history of Ménière's disease and \"vertigo\".  I did schedule her for MR a imaging of the head and neck.  This did not show any significant vascular issues.  She continues to have episodes of dizziness.  She states that her symptoms will onset and she will see the clock \"bouncing\".  It will last for several minutes at a time.  It is quite unnerving for her.  It will resolve spontaneously.  She has not yet seen the [facility] [place] as I have referred her to in the past.  However after our conversation today she seems more open to that.    The following portions of the patient's history were reviewed and updated as appropriate: allergies, current medications, past family history, past medical history, past social history, past surgical history and problem list.    Review of Systems   Eyes:  Positive for visual disturbance.   Neurological:  Positive for dizziness, speech difficulty and headaches. Negative for tremors, seizures, syncope, facial asymmetry, weakness, light-headedness and numbness.   Psychiatric/Behavioral:  Positive for hallucinations (visual). Negative for agitation, behavioral problems, confusion, decreased concentration, dysphoric mood, self-injury, sleep disturbance and suicidal ideas. The patient is not nervous/anxious and is not hyperactive.       I have reviewed the review of systems above performed by my medical assistant.      Vitals:    05/14/24 1210   BP: 108/78   Pulse: 52   SpO2: 98%       Neurologic Exam     Mental Status   Oriented to person, place, and time.   Concentration: normal.   Level of consciousness: alert  Knowledge: consistent with education (No deficits found.).     Cranial Nerves     CN II "   Visual fields full to confrontation.     CN III, IV, VI   Pupils are equal, round, and reactive to light.  Extraocular motions are normal.   CN III: no CN III palsy  CN VI: no CN VI palsy    CN V   Facial sensation intact.     CN VII   Facial expression full, symmetric.     CN VIII   CN VIII normal.     CN IX, X   CN IX normal.   CN X normal.     CN XI   CN XI normal.     CN XII   CN XII normal.     Motor Exam     Strength   Right neck flexion: 5/5  Left neck flexion: 5/5  Right neck extension: 5/5  Left neck extension: 5/5  Right deltoid: 5/5  Left deltoid: 5/5  Right biceps: 5/5  Left biceps: 5/5  Right triceps: 5/5  Left triceps: 5/5  Right wrist flexion: 5/5  Left wrist flexion: 5/5  Right wrist extension: 5/5  Left wrist extension: 5/5  Right interossei: 5/5  Left interossei: 5/5  Right abdominals: 5/5  Left abdominals: 5/5  Right iliopsoas: 5/5  Left iliopsoas: 5/5  Right quadriceps: 5/5  Left quadriceps: 5/5  Right hamstrin/5  Left hamstrin/5  Right glutei: 5/5  Left glutei: 5/5  Right anterior tibial: 5/5  Left anterior tibial: 5/5  Right posterior tibial: 5/5  Left posterior tibial: 5/5  Right peroneal: 5/5  Left peroneal: 5/5  Right gastroc: 5/5  Left gastroc: 5/5    Sensory Exam   Light touch normal.   Vibration normal.     Gait, Coordination, and Reflexes     Gait  Gait: normal    Reflexes   Right brachioradialis: 2+  Left brachioradialis: 2+  Right biceps: 2+  Left biceps: 2+  Right triceps: 2+  Left triceps: 2+  Right patellar: 2+  Left patellar: 2+  Right achilles: 2+  Left achilles: 2+  Right : 2+  Left : 2+Station is normal.       Physical Exam  Vitals reviewed.   Constitutional:       Appearance: She is well-developed.   HENT:      Head: Normocephalic and atraumatic.   Eyes:      Extraocular Movements: EOM normal.      Pupils: Pupils are equal, round, and reactive to light.   Cardiovascular:      Rate and Rhythm: Normal rate and regular rhythm.   Pulmonary:      Breath sounds:  Normal breath sounds.   Musculoskeletal:         General: Normal range of motion.   Skin:     General: Skin is warm.   Neurological:      Mental Status: She is oriented to person, place, and time.      Gait: Gait is intact.      Deep Tendon Reflexes:      Reflex Scores:       Tricep reflexes are 2+ on the right side and 2+ on the left side.       Bicep reflexes are 2+ on the right side and 2+ on the left side.       Brachioradialis reflexes are 2+ on the right side and 2+ on the left side.       Patellar reflexes are 2+ on the right side and 2+ on the left side.       Achilles reflexes are 2+ on the right side and 2+ on the left side.        Procedures    Assessment/Plan: As a mention she has agreed to move forward with the [facility] visit.  She will see them and likely need vestibular therapy which we can schedule somewhere outside of the [facility] [place] if needed.  I have asked her to drink plenty of water as well.  Apparently she does not drink much water at all.  Will see her back in about 4 months or sooner if needed.  A total of 30 minutes was spent face-to-face with the patient today.  Of that greater than 50% of this time was spent discussing signs and symptoms of dizziness, patient education, plan of care and prognosis.         Diagnoses and all orders for this visit:    1. Dizziness (Primary)           Musa Rubio II, MD

## 2024-05-28 ENCOUNTER — PATIENT MESSAGE (OUTPATIENT)
Dept: INTERNAL MEDICINE | Facility: CLINIC | Age: 89
End: 2024-05-28
Payer: MEDICARE

## 2024-05-28 DIAGNOSIS — N39.0 FREQUENT UTI: Primary | ICD-10-CM

## 2024-05-28 NOTE — TELEPHONE ENCOUNTER
She is trying to make an appointment with us or the urologist?  I can place a referral to urology and she can make an appointment here in the meantime.

## 2024-05-29 ENCOUNTER — OFFICE VISIT (OUTPATIENT)
Dept: INTERNAL MEDICINE | Facility: CLINIC | Age: 89
End: 2024-05-29
Payer: MEDICARE

## 2024-05-29 VITALS
DIASTOLIC BLOOD PRESSURE: 80 MMHG | SYSTOLIC BLOOD PRESSURE: 140 MMHG | OXYGEN SATURATION: 96 % | HEIGHT: 64 IN | WEIGHT: 123 LBS | BODY MASS INDEX: 21 KG/M2 | HEART RATE: 67 BPM

## 2024-05-29 DIAGNOSIS — R05.3 PERSISTENT COUGH: ICD-10-CM

## 2024-05-29 DIAGNOSIS — N30.00 ACUTE CYSTITIS WITHOUT HEMATURIA: Primary | ICD-10-CM

## 2024-05-29 PROCEDURE — 1159F MED LIST DOCD IN RCRD: CPT | Performed by: INTERNAL MEDICINE

## 2024-05-29 PROCEDURE — 1160F RVW MEDS BY RX/DR IN RCRD: CPT | Performed by: INTERNAL MEDICINE

## 2024-05-29 PROCEDURE — 99213 OFFICE O/P EST LOW 20 MIN: CPT | Performed by: INTERNAL MEDICINE

## 2024-05-29 PROCEDURE — 1126F AMNT PAIN NOTED NONE PRSNT: CPT | Performed by: INTERNAL MEDICINE

## 2024-05-29 RX ORDER — CIPROFLOXACIN 500 MG/1
500 TABLET, FILM COATED ORAL 2 TIMES DAILY
Qty: 14 TABLET | Refills: 0 | Status: SHIPPED | OUTPATIENT
Start: 2024-05-29 | End: 2024-06-05

## 2024-05-29 RX ORDER — BENZONATATE 200 MG/1
200 CAPSULE ORAL 3 TIMES DAILY PRN
Qty: 30 CAPSULE | Refills: 0 | Status: SHIPPED | OUTPATIENT
Start: 2024-05-29

## 2024-05-29 NOTE — PROGRESS NOTES
Subjective     Sirisha Auguste is a 88 y.o. female who presents with   Chief Complaint   Patient presents with    Difficulty Urinating    Urinary Frequency       History of Present Illness     Treated for UTI by [facility].  She took five days of macrobid.  She got better but symptoms returned.      Persistent cough after recent URI.  Overall better except for persistent cough.      Review of Systems   Respiratory:  Positive for cough.        The following portions of the patient's history were reviewed and updated as appropriate: allergies, current medications and problem list.    Patient Active Problem List    Diagnosis Date Noted    Essential hypertension 08/03/2023    Abnormal CT of the abdomen 07/10/2022     Note Last Updated: 7/12/2022     Added automatically from request for surgery [id number]      Chronic migraine without aura without status migrainosus, not intractable 05/26/2020    Generalized anxiety disorder 12/01/2019    Hypercalcemia 12/04/2018    Chronic fatigue 08/06/2018    Generalized osteoarthritis of multiple sites 03/20/2018    Recurrent vertigo 05/08/2017    Borderline hyperglycemia 06/09/2016    Gastroesophageal reflux disease 06/09/2016    Hyperlipidemia 06/09/2016    Migraine 06/09/2016       Current Outpatient Medications on File Prior to Visit   Medication Sig Dispense Refill    ALPRAZolam (XANAX) 0.5 MG tablet TAKE 1 TABLET AT NIGHT AS NEEDED FOR ANXIETY OR SLEEP. 30 tablet 0    apixaban (ELIQUIS) 2.5 MG tablet tablet Take 1 tablet by mouth 2 (Two) Times a Day. 60 tablet 1    diclofenac (VOLTAREN) 1 % gel gel Apply 4 g topically 4 (Four) Times a Day As Needed.      esomeprazole (nexIUM) 40 MG capsule TAKE 1 CAPSULE EVERY MORNING BEFORE BREAKFAST 90 capsule 10    estradiol (ESTRACE VAGINAL) 0.1 MG/GM vaginal cream Insert 1 applicator into the vagina Daily. 42.5 g 2    ezetimibe-simvastatin (VYTORIN) 10-20 MG per tablet TAKE 1 TABLET EVERY NIGHT 90 tablet 3    metoprolol succinate XL  "(TOPROL-XL) 50 MG 24 hr tablet TAKE 1 TABLET EVERY DAY 90 tablet 2    montelukast (SINGULAIR) 10 MG tablet Take 1 tablet by mouth Every Night. 90 tablet 3    Probiotic Product (PROBIOTIC PO) Take  by mouth Every Other Day.      saccharomyces boulardii (Florastor) 250 MG capsule Take 1 capsule by mouth 2 (Two) Times a Day. 90 capsule 2    SUMAtriptan (IMITREX) 100 MG tablet TAKE 1 TABLET 1 (ONE) TIME AS NEEDED FOR MIGRAINE FOR UP TO 9 DOSES. 9 tablet 11    VITAMIN D, CHOLECALCIFEROL, PO Take 2,000 Units by mouth Daily.       No current facility-administered medications on file prior to visit.       Objective     /80   Pulse 67   Ht 162.6 cm (64.02\")   Wt 55.8 kg (123 lb)   SpO2 96%   BMI 21.10 kg/m²     Physical Exam  Constitutional:       Appearance: She is well-developed.   HENT:      Head: Normocephalic and atraumatic.   Cardiovascular:      Rate and Rhythm: Normal rate and regular rhythm.      Heart sounds: Normal heart sounds.   Pulmonary:      Effort: Pulmonary effort is normal.      Breath sounds: Normal breath sounds.   Skin:     General: Skin is warm and dry.   Neurological:      Mental Status: She is alert and oriented to person, place, and time.   Psychiatric:         Behavior: Behavior normal.         Assessment & Plan   Diagnoses and all orders for this visit:    1. Acute cystitis without hematuria (Primary)    2. Persistent cough    Other orders  -     ciprofloxacin (Cipro) 500 MG tablet; Take 1 tablet by mouth 2 (Two) Times a Day for 7 days.  Dispense: 14 tablet; Refill: 0  -     benzonatate (TESSALON) 200 MG capsule; Take 1 capsule by mouth 3 (Three) Times a Day As Needed for Cough.  Dispense: 30 capsule; Refill: 0        Discussion    Patient presents with symptoms of acute cystitis without improvement.  Macrobid was not effective.  She was changed to Cipro.  The patient is instructed to let our office know if not feeling better over the next several days or if there is any change in " symptoms.   Persistent cough after URI.  Rx for tessalon Perrles.  The patient is instructed to let our office know if not feeling better over the next week or if there is any change in symptoms.             Future Appointments   Date Time Provider Department Center   6/5/2024 10:15 AM Hay Serna MD [facility]

## 2024-06-05 ENCOUNTER — OFFICE VISIT (OUTPATIENT)
Dept: INTERNAL MEDICINE | Facility: CLINIC | Age: 89
End: 2024-06-05
Payer: MEDICARE

## 2024-06-05 VITALS
HEART RATE: 70 BPM | SYSTOLIC BLOOD PRESSURE: 135 MMHG | BODY MASS INDEX: 20.55 KG/M2 | DIASTOLIC BLOOD PRESSURE: 75 MMHG | OXYGEN SATURATION: 96 % | HEIGHT: 64 IN | WEIGHT: 120.4 LBS

## 2024-06-05 DIAGNOSIS — M15.9 GENERALIZED OSTEOARTHRITIS OF MULTIPLE SITES: ICD-10-CM

## 2024-06-05 DIAGNOSIS — N39.0 FREQUENT UTI: ICD-10-CM

## 2024-06-05 DIAGNOSIS — F41.9 ANXIETY: ICD-10-CM

## 2024-06-05 DIAGNOSIS — Z79.899 HIGH RISK MEDICATION USE: ICD-10-CM

## 2024-06-05 DIAGNOSIS — Z00.00 MEDICARE ANNUAL WELLNESS VISIT, SUBSEQUENT: Primary | ICD-10-CM

## 2024-06-05 DIAGNOSIS — I10 ESSENTIAL HYPERTENSION: ICD-10-CM

## 2024-06-05 DIAGNOSIS — H81.10 BENIGN PAROXYSMAL POSITIONAL VERTIGO, UNSPECIFIED LATERALITY: ICD-10-CM

## 2024-06-05 DIAGNOSIS — I48.0 PAROXYSMAL ATRIAL FIBRILLATION: ICD-10-CM

## 2024-06-05 DIAGNOSIS — G43.709 CHRONIC MIGRAINE WITHOUT AURA WITHOUT STATUS MIGRAINOSUS, NOT INTRACTABLE: ICD-10-CM

## 2024-06-05 PROCEDURE — 99213 OFFICE O/P EST LOW 20 MIN: CPT | Performed by: STUDENT IN AN ORGANIZED HEALTH CARE EDUCATION/TRAINING PROGRAM

## 2024-06-05 PROCEDURE — 1126F AMNT PAIN NOTED NONE PRSNT: CPT | Performed by: STUDENT IN AN ORGANIZED HEALTH CARE EDUCATION/TRAINING PROGRAM

## 2024-06-05 PROCEDURE — G0439 PPPS, SUBSEQ VISIT: HCPCS | Performed by: STUDENT IN AN ORGANIZED HEALTH CARE EDUCATION/TRAINING PROGRAM

## 2024-06-05 PROCEDURE — 96160 PT-FOCUSED HLTH RISK ASSMT: CPT | Performed by: STUDENT IN AN ORGANIZED HEALTH CARE EDUCATION/TRAINING PROGRAM

## 2024-06-05 PROCEDURE — 1170F FXNL STATUS ASSESSED: CPT | Performed by: STUDENT IN AN ORGANIZED HEALTH CARE EDUCATION/TRAINING PROGRAM

## 2024-06-05 RX ORDER — SUMATRIPTAN 100 MG/1
TABLET, FILM COATED ORAL
Qty: 30 TABLET | Refills: 2 | Status: SHIPPED | OUTPATIENT
Start: 2024-06-05

## 2024-06-05 RX ORDER — NITROFURANTOIN 25; 75 MG/1; MG/1
100 CAPSULE ORAL 2 TIMES DAILY
Qty: 14 CAPSULE | Refills: 0 | Status: SHIPPED | OUTPATIENT
Start: 2024-06-05 | End: 2024-06-12

## 2024-06-05 RX ORDER — ALPRAZOLAM 0.5 MG/1
0.5 TABLET ORAL 2 TIMES DAILY PRN
Qty: 40 TABLET | Refills: 0 | Status: SHIPPED | OUTPATIENT
Start: 2024-06-05

## 2024-06-05 NOTE — PROGRESS NOTES
The ABCs of the Annual Wellness Visit  Subsequent Medicare Wellness Visit    Chief Complaint   Patient presents with    Medicare Wellness-subsequent      Subjective    History of Present Illness:  Sirisha Auguste is a 88 y.o. female who presents for a Subsequent Medicare Wellness Visit.    The following portions of the patient's history were reviewed and   updated as appropriate: allergies, current medications, past family history, past medical history, past social history, past surgical history, and problem list.    Here with daughter.     She saw Dr. Monk last week for acute cystitis.     Macrobid was not effective. She was changed to Cipro.  She was prescribed Tessalon Perles for a persisting cough after a URI. Cough is getting better. Throat is dry.     Has diarrhea on occasion.     No longer on Eliquis. Now just 2 baby ASA. Polyarticular joint pain.     Migraines better with Sumatriptan and naproxen.     Takes xanax daily.     Here with her daughter.     7 UTIs this year. Previously had PRN antibiotic.     Compared to one year ago, the patient feels her physical   health is worse.    Compared to one year ago, the patient feels her mental   health is worse.    Recent Hospitalizations:  She was not admitted to the hospital during the last year.       Current Medical Providers:  Patient Care Team:  Hay Serna MD as PCP - General (Internal Medicine)  Musa Rubio II, MD as Consulting Physician (Neurology)  Rell Magallanes MD as Consulting Physician (Cardiology)    Outpatient Medications Prior to Visit   Medication Sig Dispense Refill    benzonatate (TESSALON) 200 MG capsule Take 1 capsule by mouth 3 (Three) Times a Day As Needed for Cough. 30 capsule 0    diclofenac (VOLTAREN) 1 % gel gel Apply 4 g topically 4 (Four) Times a Day As Needed.      esomeprazole (nexIUM) 40 MG capsule TAKE 1 CAPSULE EVERY MORNING BEFORE BREAKFAST 90 capsule 10    estradiol (ESTRACE VAGINAL) 0.1 MG/GM vaginal cream Insert 1  applicator into the vagina Daily. 42.5 g 2    ezetimibe-simvastatin (VYTORIN) 10-20 MG per tablet TAKE 1 TABLET EVERY NIGHT 90 tablet 3    metoprolol succinate XL (TOPROL-XL) 50 MG 24 hr tablet TAKE 1 TABLET EVERY DAY 90 tablet 2    montelukast (SINGULAIR) 10 MG tablet Take 1 tablet by mouth Every Night. 90 tablet 3    Probiotic Product (PROBIOTIC PO) Take  by mouth Every Other Day.      saccharomyces boulardii (Florastor) 250 MG capsule Take 1 capsule by mouth 2 (Two) Times a Day. 90 capsule 2    VITAMIN D, CHOLECALCIFEROL, PO Take 2,000 Units by mouth Daily.      ALPRAZolam (XANAX) 0.5 MG tablet TAKE 1 TABLET AT NIGHT AS NEEDED FOR ANXIETY OR SLEEP. 30 tablet 0    apixaban (ELIQUIS) 2.5 MG tablet tablet Take 1 tablet by mouth 2 (Two) Times a Day. 60 tablet 1    SUMAtriptan (IMITREX) 100 MG tablet TAKE 1 TABLET 1 (ONE) TIME AS NEEDED FOR MIGRAINE FOR UP TO 9 DOSES. 9 tablet 11    ciprofloxacin (Cipro) 500 MG tablet Take 1 tablet by mouth 2 (Two) Times a Day for 7 days. 14 tablet 0     No facility-administered medications prior to visit.       No opioid medication identified on active medication list. I have reviewed chart for other potential  high risk medication/s and harmful drug interactions in the elderly.        Aspirin is not on active medication list.  Aspirin use is indicated based on review of current medical condition/s. Pros and cons of this therapy have been discussed with this patient. Benefits of this medication outweigh potential harm.  Patient has been instructed to start taking this medication..    Patient Active Problem List   Diagnosis    Borderline hyperglycemia    Gastroesophageal reflux disease    Hyperlipidemia    Migraine    Recurrent vertigo    Generalized osteoarthritis of multiple sites    Chronic fatigue    Hypercalcemia    Generalized anxiety disorder    Chronic migraine without aura without status migrainosus, not intractable    Abnormal CT of the abdomen    Essential hypertension  "    Advance Care Planning  Advance Directive is on file.  ACP discussion was held with the patient during this visit. Patient has an advance directive in EMR which is still valid.           Objective    Vitals:    24 0955 24 1106   BP: 150/74 135/75   Pulse: 70    SpO2: 96%    Weight: 54.6 kg (120 lb 6.4 oz)    Height: 162.6 cm (64.02\")      Estimated body mass index is 20.66 kg/m² as calculated from the following:    Height as of this encounter: 162.6 cm (64.02\").    Weight as of this encounter: 54.6 kg (120 lb 6.4 oz).    BMI is within normal parameters. No other follow-up for BMI required.      Does the patient have evidence of cognitive impairment? Yes    Physical Exam  Constitutional:       Appearance: Normal appearance. She is normal weight.   Cardiovascular:      Rate and Rhythm: Normal rate and regular rhythm.      Heart sounds: Normal heart sounds. No murmur heard.  Pulmonary:      Effort: Pulmonary effort is normal.      Breath sounds: Normal breath sounds.   Abdominal:      General: Abdomen is flat. There is no distension.      Palpations: Abdomen is soft.      Tenderness: There is no abdominal tenderness.   Musculoskeletal:      Right lower leg: No edema.      Left lower leg: No edema.   Skin:     General: Skin is warm and dry.   Neurological:      Mental Status: She is alert.   Psychiatric:         Mood and Affect: Mood normal.         Behavior: Behavior normal.         Thought Content: Thought content normal.                 HEALTH RISK ASSESSMENT    Smoking Status:  Social History     Tobacco Use   Smoking Status Former    Current packs/day: 0.00    Average packs/day: 0.3 packs/day for 0.5 years (0.1 ttl pk-yrs)    Types: Cigarettes    Start date: 1960    Quit date: 1960    Years since quittin.0   Smokeless Tobacco Never   Tobacco Comments    I just quit     Alcohol Consumption:  Social History     Substance and Sexual Activity   Alcohol Use Never     Fall Risk " Screen:    ONDINAADI Fall Risk Assessment was completed, and patient is at LOW risk for falls.Assessment completed on:2024    Depression Screenin/5/2024     9:56 AM   PHQ-2/PHQ-9 Depression Screening   Little Interest or Pleasure in Doing Things 0-->not at all   Feeling Down, Depressed or Hopeless 0-->not at all   PHQ-9: Brief Depression Severity Measure Score 0       Health Habits and Functional and Cognitive Screenin/5/2024     9:56 AM   Functional & Cognitive Status   Do you have difficulty preparing food and eating? No   Do you have difficulty bathing yourself, getting dressed or grooming yourself? No   Do you have difficulty using the toilet? No   Do you have difficulty moving around from place to place? No   Do you have trouble with steps or getting out of a bed or a chair? No   Do you need help using the phone?  No   Are you deaf or do you have serious difficulty hearing?  Yes   Do you need help to go to places out of walking distance? Yes   Do you need help shopping? Yes   Do you need help preparing meals?  Yes   Do you need help with housework?  Yes   Do you need help with laundry? Yes   Do you need help taking your medications? No   Do you need help managing money? No   Do you ever drive or ride in a car without wearing a seat belt? No   Have you felt unusual stress, anger or loneliness in the last month? No   Who do you live with? Alone   If you need help, do you have trouble finding someone available to you? No   Have you been bothered in the last four weeks by sexual problems? No   Do you have difficulty concentrating, remembering or making decisions? No       Age-appropriate Screening Schedule:  Refer to the list below for future screening recommendations based on patient's age, sex and/or medical conditions. Orders for these recommended tests are listed in the plan section. The patient has been provided with a written plan.    Health Maintenance   Topic Date Due    TDAP/TD VACCINES  (1 - Tdap) Never done    RSV Vaccine - Adults (1 - 1-dose 60+ series) Never done    ZOSTER VACCINE (3 of 3) 07/23/2019    COVID-19 Vaccine (7 - 2023-24 season) 02/05/2024    ANNUAL WELLNESS VISIT  04/26/2024    DXA SCAN  06/05/2025 (Originally 1935)    LIPID PANEL  07/17/2024    INFLUENZA VACCINE  08/01/2024    Pneumococcal Vaccine 65+  Completed              Assessment & Plan   CMS Preventative Services Quick Reference  Risk Factors Identified During Encounter  Immunizations Discussed/Encouraged: Shingrix, COVID19, and RSV (Respiratory Syncytial Virus)  The above risks/problems have been discussed with the patient.  Follow up actions/plans if indicated are seen below in the Assessment/Plan Section.  Pertinent information has been shared with the patient in the After Visit Summary.    Diagnoses and all orders for this visit:    1. Medicare annual wellness visit, subsequent (Primary)    2. Chronic migraine without aura without status migrainosus, not intractable  -     SUMAtriptan (IMITREX) 100 MG tablet; Take one tablet at onset of headache. May repeat dose one time in 2 hours if headache not relieved.  Dispense: 30 tablet; Refill: 2    3. Anxiety  -     ALPRAZolam (XANAX) 0.5 MG tablet; Take 1 tablet by mouth 2 (Two) Times a Day As Needed for Anxiety.  Dispense: 40 tablet; Refill: 0  -     Comprehensive Metabolic Panel  -     CBC & Differential  -     Lipid Panel    4. Generalized osteoarthritis of multiple sites  -     Comprehensive Metabolic Panel  -     CBC & Differential  -     Lipid Panel    5. Benign paroxysmal positional vertigo, unspecified laterality  -     Ambulatory Referral to Social Care Services (Amb Case Mgmt)    6. Frequent UTI  -     Urinalysis With Culture If Indicated -    7. Essential hypertension  -     Comprehensive Metabolic Panel  -     CBC & Differential  -     Lipid Panel    8. High risk medication use  -     Urine Drug Screen - Urine, Clean Catch    9. Paroxysmal atrial  fibrillation    Other orders  -     nitrofurantoin, macrocrystal-monohydrate, (Macrobid) 100 MG capsule; Take 1 capsule by mouth 2 (Two) Times a Day for 7 days. For UTI  Dispense: 14 capsule; Refill: 0        She was told to stop Eliquis temporarily for heavy hemorrhoidal bleeding but she never resumed it. Discussed she is at an increased risk for stroke now she is off eliquis. She states she did not know when she had first stroke. Discussed this may not be the case if she had a second stroke. Advised that strokes have the potential to be debilitating. Hemorrhoidal bleeding resolved. She will think about restarting Eliquis.     Not going to [facility] yet. Transportation is an issue.Referring to social work.     She would like more Imitrex with each refill. She is aware of stroke risk but migraines are quite debilitating to her.     Discussed duloxetine, NSAIDs and Tylenol for Osteoarthritis. Discussed ortho and PT referral. They will think about this. Now that she is off Eliquis NSAIDs could be an option.  Duloxetine could help with anxiety as well.    She is on Xanax as needed.  She takes 1-1-1/2 tabs daily..  [place] reviewed and appropriate.    Consider daily ppx for UTI. Checking UA today.  States she tends to get UTIs on the weekend.  Will give a course of Macrobid to take in case this happens.  If symptoms do not resolve in 2 days she will need to call our office    Follow Up:   Return in about 3 months (around 9/5/2024).     An After Visit Summary and PPPS were made available to the patient.

## 2024-06-06 ENCOUNTER — REFERRAL TRIAGE (OUTPATIENT)
Age: 89
End: 2024-06-06
Payer: MEDICARE

## 2024-06-06 LAB
ALBUMIN SERPL-MCNC: 4.6 G/DL (ref 3.5–5.2)
ALBUMIN/GLOB SERPL: 1.7 G/DL
ALP SERPL-CCNC: 80 U/L (ref 39–117)
ALT SERPL-CCNC: 10 U/L (ref 1–33)
AMPHETAMINES UR QL SCN: NORMAL NG/ML
AST SERPL-CCNC: 20 U/L (ref 1–32)
BARBITURATES UR QL SCN: NORMAL
BASOPHILS # BLD AUTO: 0.04 10*3/MM3 (ref 0–0.2)
BASOPHILS NFR BLD AUTO: 0.7 % (ref 0–1.5)
BENZODIAZ UR QL SCN: NORMAL
BILIRUB SERPL-MCNC: 0.9 MG/DL (ref 0–1.2)
BUN SERPL-MCNC: 19 MG/DL (ref 8–23)
BUN/CREAT SERPL: 21.3 (ref 7–25)
BZE UR QL SCN: NORMAL
CALCIUM SERPL-MCNC: 10.3 MG/DL (ref 8.6–10.5)
CANNABINOIDS UR QL SCN: NORMAL
CHLORIDE SERPL-SCNC: 100 MMOL/L (ref 98–107)
CHOLEST SERPL-MCNC: 150 MG/DL (ref 0–200)
CO2 SERPL-SCNC: 25.8 MMOL/L (ref 22–29)
CREAT SERPL-MCNC: 0.89 MG/DL (ref 0.57–1)
EGFRCR SERPLBLD CKD-EPI 2021: 62.4 ML/MIN/1.73
EOSINOPHIL # BLD AUTO: 0.11 10*3/MM3 (ref 0–0.4)
EOSINOPHIL NFR BLD AUTO: 2 % (ref 0.3–6.2)
ERYTHROCYTE [DISTWIDTH] IN BLOOD BY AUTOMATED COUNT: 12.2 % (ref 12.3–15.4)
GLOBULIN SER CALC-MCNC: 2.7 GM/DL
GLUCOSE SERPL-MCNC: 89 MG/DL (ref 65–99)
HCT VFR BLD AUTO: 41.1 % (ref 34–46.6)
HDLC SERPL-MCNC: 54 MG/DL (ref 40–60)
HGB BLD-MCNC: 13.5 G/DL (ref 12–15.9)
IMM GRANULOCYTES # BLD AUTO: 0.01 10*3/MM3 (ref 0–0.05)
IMM GRANULOCYTES NFR BLD AUTO: 0.2 % (ref 0–0.5)
LDLC SERPL CALC-MCNC: 74 MG/DL (ref 0–100)
LYMPHOCYTES # BLD AUTO: 1.62 10*3/MM3 (ref 0.7–3.1)
LYMPHOCYTES NFR BLD AUTO: 30.1 % (ref 19.6–45.3)
MCH RBC QN AUTO: 30.8 PG (ref 26.6–33)
MCHC RBC AUTO-ENTMCNC: 32.8 G/DL (ref 31.5–35.7)
MCV RBC AUTO: 93.6 FL (ref 79–97)
METHADONE UR QL SCN: NORMAL
MONOCYTES # BLD AUTO: 0.45 10*3/MM3 (ref 0.1–0.9)
MONOCYTES NFR BLD AUTO: 8.3 % (ref 5–12)
NEUTROPHILS # BLD AUTO: 3.16 10*3/MM3 (ref 1.7–7)
NEUTROPHILS NFR BLD AUTO: 58.7 % (ref 42.7–76)
NRBC BLD AUTO-RTO: 0 /100 WBC (ref 0–0.2)
OPIATES UR QL SCN: NORMAL
OXYCODONE+OXYMORPHONE UR QL SCN: NORMAL
PCP UR QL: NORMAL
PLATELET # BLD AUTO: 201 10*3/MM3 (ref 140–450)
POTASSIUM SERPL-SCNC: 4.5 MMOL/L (ref 3.5–5.2)
PROPOXYPH UR QL SCN: NORMAL
PROT SERPL-MCNC: 7.3 G/DL (ref 6–8.5)
RBC # BLD AUTO: 4.39 10*6/MM3 (ref 3.77–5.28)
SODIUM SERPL-SCNC: 139 MMOL/L (ref 136–145)
SPECIMEN STATUS: NORMAL
TRIGL SERPL-MCNC: 125 MG/DL (ref 0–150)
VLDLC SERPL CALC-MCNC: 22 MG/DL (ref 5–40)
WBC # BLD AUTO: 5.39 10*3/MM3 (ref 3.4–10.8)

## 2024-06-09 LAB
APPEARANCE UR: CLEAR
BACTERIA #/AREA URNS HPF: ABNORMAL /[HPF]
BACTERIA UR CULT: ABNORMAL
BILIRUB UR QL STRIP: NEGATIVE
CASTS URNS QL MICRO: ABNORMAL /LPF
COLOR UR: YELLOW
EPI CELLS #/AREA URNS HPF: ABNORMAL /HPF (ref 0–10)
GLUCOSE UR QL STRIP: NEGATIVE
HGB UR QL STRIP: NEGATIVE
KETONES UR QL STRIP: NEGATIVE
LEUKOCYTE ESTERASE UR QL STRIP: NEGATIVE
MICRO URNS: NORMAL
MICRO URNS: NORMAL
NITRITE UR QL STRIP: NEGATIVE
OTHER ANTIBIOTIC SUSC ISLT: ABNORMAL
PH UR STRIP: 5.5 [PH] (ref 5–7.5)
PROT UR QL STRIP: NEGATIVE
RBC #/AREA URNS HPF: ABNORMAL /HPF (ref 0–2)
SP GR UR STRIP: 1.01 (ref 1–1.03)
URINALYSIS REFLEX: NORMAL
UROBILINOGEN UR STRIP-MCNC: 0.2 MG/DL (ref 0.2–1)
WBC #/AREA URNS HPF: ABNORMAL /HPF (ref 0–5)

## 2024-06-10 ENCOUNTER — PATIENT OUTREACH (OUTPATIENT)
Age: 89
End: 2024-06-10
Payer: MEDICARE

## 2024-06-10 NOTE — OUTREACH NOTE
Social Work Assessment  Questions/Answers      Flowsheet Row Most Recent Value   Referral Source outpatient staff, outpatient clinic, physician   Reason for Consult community resources, other (see comments), transportation  [prepared meals]   Preferred Language English   Advance Care Planning Reviewed present on chart, no concerns identified   People in Home alone   Current Living Arrangements home   Potentially Unsafe Housing Conditions none   In the past 12 months has the electric, gas, oil, or water company threatened to shut off services in your home? No   Primary Care Provided by self   Provides Primary Care For no one   Quality of Family Relationships helpful, involved, supportive   Employment Status retired   Source of Income unable to assess   Application for Public Assistance pending public assistance pending number   Usual Activity Tolerance moderate   Current Activity Tolerance moderate   Medications independent   Meal Preparation assistive person   Housekeeping assistive person   Laundry assistive person   Shopping assistive person   Transportation Anticipated family or friend will provide        SDOH updated and reviewed with the patient during this program:  --     Employment: Not At Risk (6/10/2024)    Employment     Do you want help finding or keeping work or a job?: I do not need or want help      Financial Resource Strain: Low Risk  (6/10/2024)    Overall Financial Resource Strain (CARDIA)     Difficulty of Paying Living Expenses: Not very hard      --     Food Insecurity: No Food Insecurity (6/10/2024)    Hunger Vital Sign     Worried About Running Out of Food in the Last Year: Never true     Ran Out of Food in the Last Year: Never true      --     Housing Stability: Low Risk  (6/10/2024)    Housing Stability Vital Sign     Unable to Pay for Housing in the Last Year: No     Number of Times Moved in the Last Year: 1     Homeless in the Last Year: No      --     Transportation Needs: No  Transportation Needs (6/10/2024)    PRAPARE - Transportation     Lack of Transportation (Medical): No     Lack of Transportation (Non-Medical): No      --     Utilities: Not At Risk (6/10/2024)    [facility]     Threatened with loss of utilities: No      Continuing Care   Community & DME   [facility]    [address] [address]    Phone: [phone]    Resource for: Financial Resource Strain, Food Insecurity, Utilities   [facility]    [address] [address]    Phone: [phone]    Resource for: Transportation Needs   [facility]    [address] [address]    Phone: [phone]    Resource for: Transportation Needs     Patient Outreach    MSW outreach to patient's daughter Philomath regarding community resource needs as requested per primary care provider referral. Patient's daughter and MSW briefly discussed transportation options that are available including [employer] Whe[employer], [employer], and possible rides through Medicare Advantage plan. Patient's daughter states that she prefers to attend appointments with patient since it is sometimes difficult for patient to remember what the provider has discussed during her appointments. MSW and patient's daughter also discussed patient's difficulty with housework and laundry and patient's daughter discussed she has hired someone to assist. MSW and patient's daughter also discussed prepared meals for patient and discussed agencies that may assist with prepared meals including [employer], [employer], and [facility]. Patient's daughter states patient did not like meals from [employer] but will contact other agencies to discuss their prepared meals further. Patient's daughter denied additional needs at this time and was provided MSW contact information to call back to MSW as needed.    Casandra HAYDEN -   Ambulatory Case Management    6/10/2024, 09:57  EDT

## 2024-06-11 ENCOUNTER — TELEPHONE (OUTPATIENT)
Dept: CARDIOLOGY | Facility: CLINIC | Age: 89
End: 2024-06-11
Payer: MEDICARE

## 2024-06-11 NOTE — TELEPHONE ENCOUNTER
"I spoke with pt's daughter, Marcos (okay per JOSÉ).  She shares that pt is \"stubborn\".  Pt has meniere's disease and as such can bump into this and scrape her ankles on furniture.  She had problems with these minor injuries and bleeding when on apixaban in the past.  She was instructed previously to hold medication for a couple days, then resume.  Pt didn't want to resume medicine once the couple of days had passed.    The apixaban is $150/month, and Marcos says she can afford this.  She's requesting samples.  She is going to talk with pt to see if she will come in for an appt.  Pt doesn't want to come to any doctors typically, unless it is her PCP.    I've placed 2 week's worth of apixaban 2.5 mg BID at the  for pickup.  Marcos is aware that these are ready for them to pickup at their convenience.    LOT: [id number]  EXP: Jan 2026    Thank you,    Sarah JONES RN  Triage [facility]  06/11/24 09:47 EDT        "

## 2024-06-11 NOTE — TELEPHONE ENCOUNTER
----- Message from Rell Magallanes sent at 6/10/2024  7:13 PM EDT -----  Thanks Dr Serna,    Triage, can you reach out to her and see if it was due to cost, vs desire not to take it due to concerns about bleeding, or another reason? She was supposed to be scheduled for an appt with TK in Feb and doesn't have follow up scheduled with us. Would she be willing to make an appt?    Jdk  ----- Message -----  From: Hay Serna MD  Sent: 6/5/2024   5:29 PM EDT  To: Rell Magallanes MD    Just FYI patient has stopped her Eliquis.  Discussed today.

## 2024-06-14 LAB
AMPHETAMINES UR QL SCN: NORMAL NG/ML
BARBITURATES UR QL SCN: NORMAL
BENZODIAZ UR QL SCN: NORMAL
BZE UR QL SCN: NORMAL
CANNABINOIDS UR QL SCN: NORMAL
METHADONE UR QL SCN: NORMAL
OPIATES UR QL SCN: NORMAL
OXYCODONE+OXYMORPHONE UR QL SCN: NORMAL
PCP UR QL: NORMAL
PROPOXYPH UR QL SCN: NORMAL
SPECIMEN STATUS: NORMAL
WRITTEN AUTHORIZATION: NORMAL

## 2024-06-18 NOTE — TELEPHONE ENCOUNTER
Called and spoke with pt's daughter, she is currently in the [facility] (inpatient), due to a fall/ injury (head).  Pt will need a hospital follow up once discharged with [facility].  Pt's daughter will call to make the appt once she has d/c details.

## 2024-07-10 ENCOUNTER — TELEMEDICINE (OUTPATIENT)
Dept: INTERNAL MEDICINE | Facility: CLINIC | Age: 89
End: 2024-07-10
Payer: MEDICARE

## 2024-07-10 DIAGNOSIS — I61.5 INTRAVENTRICULAR HEMORRHAGE: ICD-10-CM

## 2024-07-10 DIAGNOSIS — Z86.73 HISTORY OF STROKE: ICD-10-CM

## 2024-07-10 DIAGNOSIS — I48.0 PAROXYSMAL ATRIAL FIBRILLATION: Primary | ICD-10-CM

## 2024-07-10 PROCEDURE — 99214 OFFICE O/P EST MOD 30 MIN: CPT | Performed by: STUDENT IN AN ORGANIZED HEALTH CARE EDUCATION/TRAINING PROGRAM

## 2024-07-10 PROCEDURE — 1126F AMNT PAIN NOTED NONE PRSNT: CPT | Performed by: STUDENT IN AN ORGANIZED HEALTH CARE EDUCATION/TRAINING PROGRAM

## 2024-07-10 NOTE — PROGRESS NOTES
Hay Serna M.D.  Internal Medicine  [facility]  [address], [address]  [place] [address]  [phone]      Chief Complaint  Hospital follow-up    SUBJECTIVE    History of Present Illness    Sirisha Auguste is a 88 y.o. female who presents to the office today as an established patient that last saw me on 6/5/2024.     Mode of Visit: Video  Location of patient: home  Location of provider: [facility] clinic  You have chosen to receive care through a telehealth visit.  Does the patient consent to use a video/audio connection their medical care today? yes  The visit included audio and video interaction. No technical issues occurred during this visit.       Crossed the driveway at [facility] and fell off the curb. She was wearing Flip Flops. She was unconscious and did not remember fall.     Patient admitted to [facility] June 16 after a mechanical fall resulting in intraventricular hemorrhage.  Patient given Zio patch before discharge.  Advised it was okay to start aspirin for A-fib in 2 weeks.  Her metoprolol was changed to Coreg for improved blood pressure control.  Only wore ziopatch for a day.    Using walker.     BP was elevated in hospital. BP is better at home.  They never got carvedilol so she is taking metoprolol.    Given 30 days of macrobid at hospital.     Took xanax the night before fall.     Doing PT    Review of Systems    Allergies   Allergen Reactions    Atorvastatin Myalgia    Sulfa Antibiotics Other (See Comments)     unknown    Contrast Dye (Echo Or Unknown Ct/Mr) Nausea And Vomiting     Patients daughter reports the allergy to MRI IV contrast which happened many years ago , in the early 90's, which caused patient to be hospitalized for an 3 extra days. (Patient and her daughter would consider contrast allergy premedication in the future as of 8/18/2023)         No outpatient medications have been marked as taking for the 7/10/24 encounter (Telemedicine) with Hay Serna MD.         Past Medical History:   Diagnosis Date    Anxiety 1985+/-    Arthritis     Bacteremia 07/13/2022    Breast cyst     E. coli colitis 07/14/2022    GERD (gastroesophageal reflux disease)     Hyperglycemia     Hyperlipidemia     Hypertension     Low back pain     Migraines     Osteopenia     RBBB     Recurrent vertigo 05/08/2017    Urinary tract infection     many times over years     Past Surgical History:   Procedure Laterality Date    BLADDER SURGERY      BREAST CYST ASPIRATION      COLONOSCOPY N/A 7/14/2022    Procedure: COLONOSCOPY TO 20CM WITH COLD BIOPSIES;  Surgeon: Henrik Gaston MD;  Location: [facility] ENDOSCOPY;  Service: Gastroenterology;  Laterality: N/A;  PRE- ABNORMAL CT, BLACK TARRY STOOL, ABDOMINAL PAIN  POST- COLITIS    ENDOSCOPY N/A 7/12/2022    Procedure: ESOPHAGOGASTRODUODENOSCOPY WITH COLD BIOPSIES;  Surgeon: Yoselyn Gates MD;  Location: [facility] ENDOSCOPY;  Service: Gastroenterology;  Laterality: N/A;  PRE- DYSPEPSIA, MELENA  POST- MILD GASTRITIS, SMALL HIATAL HERNIA    HYSTERECTOMY      OOPHORECTOMY      SIGMOIDOSCOPY N/A 5/8/2023    Procedure: SIGMOIDOSCOPY FLEXIBLE TO LEFT TRANSVERSE COLON;  Surgeon: Yoselyn Gates MD;  Location: [facility] ENDOSCOPY;  Service: Gastroenterology;  Laterality: N/A;  PRE- ABNORMAL CT OF COLON, FOLLOW-UP COLITIS  POST- HEMORRHOIDS     Family History   Problem Relation Age of Onset    Cancer Sister         breast    Breast cancer Sister     Cancer Maternal Aunt         breast    Breast cancer Maternal Aunt     Anxiety disorder Mother         no panic attacks known    Arthritis Mother         very bad case    reports that she quit smoking about 64 years ago. Her smoking use included cigarettes. She started smoking about 64 years ago. She has a 0.1 pack-year smoking history. She has never used smokeless tobacco. She reports that she does not drink alcohol and does not use drugs.    OBJECTIVE    Vital Signs:   There were no vitals taken for this visit.    Physical  Exam  Constitutional:       General: She is not in acute distress.     Appearance: Normal appearance.   Pulmonary:      Effort: Pulmonary effort is normal. No respiratory distress.   Skin:     Comments: Laceration on right forehead healing well   Neurological:      Mental Status: She is alert. Mental status is at baseline.   Psychiatric:         Mood and Affect: Mood normal.         Behavior: Behavior normal.         Thought Content: Thought content normal.            The following data was reviewed by: Hay Serna MD on 07/10/2024:  CMP          6/5/2024    11:13   CMP   Glucose 89    BUN 19    Creatinine 0.89    Sodium 139    Potassium 4.5    Chloride 100    Calcium 10.3    Total Protein 7.3    Albumin 4.6    Globulin 2.7    Total Bilirubin 0.9    Alkaline Phosphatase 80    AST (SGOT) 20    ALT (SGPT) 10    BUN/Creatinine Ratio 21.3      CBC w/diff          7/17/2023    10:40 6/5/2024    11:13   CBC w/Diff   WBC 6.59  5.39    RBC 4.39  4.39    Hemoglobin 13.5  13.5    Hematocrit 40.8  41.1    MCV 92.9  93.6    MCH 30.8  30.8    MCHC 33.1  32.8    RDW 12.1  12.2    Platelets 186  201    Neutrophil Rel % 68.3  58.7    Lymphocyte Rel % 20.6  30.1    Monocyte Rel % 9.4  8.3    Eosinophil Rel % 1.1  2.0    Basophil Rel % 0.3  0.7      Lipid Panel          6/5/2024    11:13   Lipid Panel   Total Cholesterol 150    Triglycerides 125    HDL Cholesterol 54    VLDL Cholesterol 22    LDL Cholesterol  74            Data reviewed : Recent hospital notes              ASSESSMENT & PLAN     Diagnoses and all orders for this visit:    1. Paroxysmal atrial fibrillation (Primary)    2. History of stroke    3. Intraventricular hemorrhage         She has had brain imaging which has shown evidence of old left and right cerebellar strokes, as well as evidence of an old parietal stroke    Declines uro gyn referral.  Will continue prophylaxis with daily Macrobid.    Now given new brain bleed there is a question of whether risk outweighs  benefit for anticoagulation. Continue aspirin.  Recommend cardiology follow-up.    Continue PT to work on balance and prevent future falls.  Discussed Xanax can increase fall risk.  Encourage patient to use this only very sparingly.    Per discharge summary [facility] had switched her from metoprolol to carvedilol.  Patient does not have carvedilol at home.  She has been monitoring her blood pressure at home.  Continue metoprolol for now and bring log to next visit.        Health Maintenance Due   Topic Date Due    RSV Vaccine - Adults (1 - 1-dose 60+ series) Never done    ZOSTER VACCINE (3 of 3) 07/23/2019    COVID-19 Vaccine (7 - 2023-24 season) 02/05/2024        Follow Up  No follow-ups on file.    Patient/family had no further questions at this time and verbalized understanding of the plan discussed today.

## 2024-07-26 DIAGNOSIS — N39.0 FREQUENT UTI: ICD-10-CM

## 2024-07-26 RX ORDER — NITROFURANTOIN 25; 75 MG/1; MG/1
CAPSULE ORAL
Qty: 14 CAPSULE | Refills: 0 | OUTPATIENT
Start: 2024-07-26

## 2024-08-07 DIAGNOSIS — N39.0 FREQUENT UTI: Primary | ICD-10-CM

## 2024-08-07 RX ORDER — NITROFURANTOIN 25; 75 MG/1; MG/1
100 CAPSULE ORAL DAILY
Qty: 90 CAPSULE | Refills: 2 | Status: SHIPPED | OUTPATIENT
Start: 2024-08-07

## 2024-09-12 DIAGNOSIS — F41.9 ANXIETY: ICD-10-CM

## 2024-09-12 RX ORDER — ALPRAZOLAM 0.5 MG
0.5 TABLET ORAL 2 TIMES DAILY PRN
Qty: 40 TABLET | Refills: 0 | Status: SHIPPED | OUTPATIENT
Start: 2024-09-12

## 2024-09-30 DIAGNOSIS — I48.20 CHRONIC ATRIAL FIBRILLATION WITH RAPID VENTRICULAR RESPONSE: ICD-10-CM

## 2024-09-30 RX ORDER — METOPROLOL SUCCINATE 50 MG/1
TABLET, EXTENDED RELEASE ORAL
Qty: 90 TABLET | Refills: 3 | Status: SHIPPED | OUTPATIENT
Start: 2024-09-30

## 2024-11-11 DIAGNOSIS — N39.0 FREQUENT UTI: ICD-10-CM

## 2024-11-11 NOTE — TELEPHONE ENCOUNTER
Caller: [facility] - [place], [place] - [address] - [phone] [facility] [phone] FX    Relationship: Pharmacy    Best call back number: [phone]    Requested Prescriptions:   Requested Prescriptions     Pending Prescriptions Disp Refills    nitrofurantoin, macrocrystal-monohydrate, (Macrobid) 100 MG capsule 90 capsule 2     Sig: Take 1 capsule by mouth Daily. For UTI prophylaxis.        Pharmacy where request should be sent: [facility] - [place], [place] - [address] - [phone] [facility] [phone] FX     Last office visit with prescribing clinician: 6/5/2024   Last telemedicine visit with prescribing clinician: 7/10/2024   Next office visit with prescribing clinician: 12/12/2024     Additional details provided by patient:     Does the patient have less than a 3 day supply:  [] Yes  [] No      Chloe Barnes Rep   11/11/24 14:46 EST

## 2024-11-12 RX ORDER — NITROFURANTOIN 25; 75 MG/1; MG/1
100 CAPSULE ORAL DAILY
Qty: 90 CAPSULE | Refills: 2 | Status: SHIPPED | OUTPATIENT
Start: 2024-11-12

## 2024-11-24 DIAGNOSIS — K21.9 GASTROESOPHAGEAL REFLUX DISEASE, UNSPECIFIED WHETHER ESOPHAGITIS PRESENT: ICD-10-CM

## 2024-11-25 RX ORDER — ESOMEPRAZOLE MAGNESIUM 40 MG/1
40 CAPSULE, DELAYED RELEASE ORAL
Qty: 90 CAPSULE | Refills: 3 | Status: SHIPPED | OUTPATIENT
Start: 2024-11-25

## 2024-11-30 DIAGNOSIS — F41.9 ANXIETY: ICD-10-CM

## 2024-12-02 RX ORDER — ALPRAZOLAM 0.5 MG
0.5 TABLET ORAL 2 TIMES DAILY PRN
Qty: 40 TABLET | Refills: 0 | Status: SHIPPED | OUTPATIENT
Start: 2024-12-02

## 2024-12-12 ENCOUNTER — OFFICE VISIT (OUTPATIENT)
Dept: INTERNAL MEDICINE | Facility: CLINIC | Age: 89
End: 2024-12-12
Payer: MEDICARE

## 2024-12-12 VITALS
SYSTOLIC BLOOD PRESSURE: 130 MMHG | WEIGHT: 115.2 LBS | HEIGHT: 64 IN | BODY MASS INDEX: 19.67 KG/M2 | OXYGEN SATURATION: 96 % | DIASTOLIC BLOOD PRESSURE: 76 MMHG | HEART RATE: 61 BPM

## 2024-12-12 DIAGNOSIS — I61.5 INTRAVENTRICULAR HEMORRHAGE: ICD-10-CM

## 2024-12-12 DIAGNOSIS — I48.0 PAROXYSMAL ATRIAL FIBRILLATION: Primary | ICD-10-CM

## 2024-12-12 DIAGNOSIS — I10 ESSENTIAL HYPERTENSION: ICD-10-CM

## 2024-12-12 DIAGNOSIS — Z78.9 TAKES DIETARY SUPPLEMENTS: ICD-10-CM

## 2024-12-12 DIAGNOSIS — F41.9 ANXIETY: ICD-10-CM

## 2024-12-12 DIAGNOSIS — N39.0 FREQUENT UTI: ICD-10-CM

## 2024-12-12 DIAGNOSIS — Z86.73 HISTORY OF STROKE: ICD-10-CM

## 2024-12-12 DIAGNOSIS — G43.709 CHRONIC MIGRAINE WITHOUT AURA WITHOUT STATUS MIGRAINOSUS, NOT INTRACTABLE: ICD-10-CM

## 2024-12-12 LAB
BUN SERPL-MCNC: 24 MG/DL (ref 8–23)
BUN/CREAT SERPL: 25.5 (ref 7–25)
CALCIUM SERPL-MCNC: 10 MG/DL (ref 8.6–10.5)
CHLORIDE SERPL-SCNC: 101 MMOL/L (ref 98–107)
CO2 SERPL-SCNC: 25 MMOL/L (ref 22–29)
CREAT SERPL-MCNC: 0.94 MG/DL (ref 0.57–1)
EGFRCR SERPLBLD CKD-EPI 2021: 58.1 ML/MIN/1.73
GLUCOSE SERPL-MCNC: 94 MG/DL (ref 65–99)
POTASSIUM SERPL-SCNC: 4.5 MMOL/L (ref 3.5–5.2)
SODIUM SERPL-SCNC: 137 MMOL/L (ref 136–145)

## 2024-12-12 PROCEDURE — 99214 OFFICE O/P EST MOD 30 MIN: CPT | Performed by: STUDENT IN AN ORGANIZED HEALTH CARE EDUCATION/TRAINING PROGRAM

## 2024-12-12 PROCEDURE — 1126F AMNT PAIN NOTED NONE PRSNT: CPT | Performed by: STUDENT IN AN ORGANIZED HEALTH CARE EDUCATION/TRAINING PROGRAM

## 2024-12-12 RX ORDER — DEXTROMETHORPHAN HYDROBROMIDE AND PROMETHAZINE HYDROCHLORIDE 15; 6.25 MG/5ML; MG/5ML
5 SYRUP ORAL 4 TIMES DAILY PRN
Qty: 118 ML | Refills: 0 | Status: SHIPPED | OUTPATIENT
Start: 2024-12-12

## 2024-12-12 NOTE — PROGRESS NOTES
Hay Serna M.D.  Internal Medicine  [facility]  [address], [address]  [place] [address]  [phone]      Chief Complaint  Hyperlipidemia (6 mth F/U /), Hypertension, and Med Refill (Would like cough syrup refilled /)    SUBJECTIVE    History of Present Illness    Sirisha Auguste is a 89 y.o. female with migraines, anxiety, GERD, hyperlipidemia, A-fib who presents to the office today as an established patient that last saw me on 6/5/2024.     [facility] after a mechanical fall resulting in intraventricular hemorrhage. She has had brain imaging which has shown evidence of old left and right cerebellar strokes, as well as evidence of an old parietal stroke. No longer on Eliquis but on ASA.     On Xanax for anxiety and sleep.     On Imitrex for migraine    Hypertension-on metoprolol. Was increased to 75 mg in hospital but decreased back down.  Gets dizzy spells. Using rollator.     -162/68    Chronic nagging cough. Was taking promethazine and codeine for years. Can't stop coughing at night. Tessalon perles do not work. Taking Robitussin. Clearing throat. Tried everything OTC.     Leg cramps. Taking potassium and magnesium for this. This helps    Review of Systems    Allergies   Allergen Reactions    Atorvastatin Myalgia    Sulfa Antibiotics Other (See Comments)     unknown    Contrast Dye (Echo Or Unknown Ct/Mr) Nausea And Vomiting     Patients daughter reports the allergy to MRI IV contrast which happened many years ago , in the early 90's, which caused patient to be hospitalized for an 3 extra days. (Patient and her daughter would consider contrast allergy premedication in the future as of 8/18/2023)         Outpatient Medications Marked as Taking for the 12/12/24 encounter (Office Visit) with Hay Serna MD   Medication Sig Dispense Refill    ALPRAZolam (XANAX) 0.5 MG tablet TAKE 1 TABLET TWICE DAILY AS NEEDED FOR ANXIETY. 40 tablet 0    benzonatate  (TESSALON) 200 MG capsule Take 1 capsule by mouth 3 (Three) Times a Day As Needed for Cough. 30 capsule 0    diclofenac (VOLTAREN) 1 % gel gel Apply 4 g topically 4 (Four) Times a Day As Needed.      esomeprazole (nexIUM) 40 MG capsule TAKE 1 CAPSULE EVERY MORNING BEFORE BREAKFAST 90 capsule 3    estradiol (ESTRACE VAGINAL) 0.1 MG/GM vaginal cream Insert 1 applicator into the vagina Daily. 42.5 g 2    ezetimibe-simvastatin (VYTORIN) 10-20 MG per tablet TAKE 1 TABLET EVERY NIGHT 90 tablet 3    metoprolol succinate XL (TOPROL-XL) 50 MG 24 hr tablet TAKE 1 TABLET EVERY DAY 90 tablet 3    montelukast (SINGULAIR) 10 MG tablet Take 1 tablet by mouth Every Night. 90 tablet 3    Probiotic Product (PROBIOTIC PO) Take  by mouth Every Other Day.      saccharomyces boulardii (Florastor) 250 MG capsule Take 1 capsule by mouth 2 (Two) Times a Day. 90 capsule 2    SUMAtriptan (IMITREX) 100 MG tablet Take one tablet at onset of headache. May repeat dose one time in 2 hours if headache not relieved. 30 tablet 2    VITAMIN D, CHOLECALCIFEROL, PO Take 2,000 Units by mouth Daily.          Past Medical History:   Diagnosis Date    Anxiety 1985+/-    Arthritis     Bacteremia 07/13/2022    Breast cyst     E. coli colitis 07/14/2022    GERD (gastroesophageal reflux disease)     Hyperglycemia     Hyperlipidemia     Hypertension     Low back pain     Migraines     Osteopenia     RBBB     Recurrent vertigo 05/08/2017    Urinary tract infection     many times over years     Past Surgical History:   Procedure Laterality Date    BLADDER SURGERY      BREAST CYST ASPIRATION      COLONOSCOPY N/A 7/14/2022    Procedure: COLONOSCOPY TO 20CM WITH COLD BIOPSIES;  Surgeon: Henrik Gaston MD;  Location: [facility] ENDOSCOPY;  Service: Gastroenterology;  Laterality: N/A;  PRE- ABNORMAL CT, BLACK TARRY STOOL, ABDOMINAL PAIN  POST- COLITIS    ENDOSCOPY N/A 7/12/2022    Procedure: ESOPHAGOGASTRODUODENOSCOPY WITH COLD BIOPSIES;  Surgeon: Yoselyn Gates MD;   "Location: [facility] ENDOSCOPY;  Service: Gastroenterology;  Laterality: N/A;  PRE- DYSPEPSIA, MELENA  POST- MILD GASTRITIS, SMALL HIATAL HERNIA    HYSTERECTOMY      OOPHORECTOMY      SIGMOIDOSCOPY N/A 5/8/2023    Procedure: SIGMOIDOSCOPY FLEXIBLE TO LEFT TRANSVERSE COLON;  Surgeon: Yoselyn Gates MD;  Location: [facility] ENDOSCOPY;  Service: Gastroenterology;  Laterality: N/A;  PRE- ABNORMAL CT OF COLON, FOLLOW-UP COLITIS  POST- HEMORRHOIDS     Family History   Problem Relation Age of Onset    Cancer Sister         breast    Breast cancer Sister     Cancer Maternal Aunt         breast    Breast cancer Maternal Aunt     Anxiety disorder Mother         no panic attacks known    Arthritis Mother         very bad case    reports that she quit smoking about 64 years ago. Her smoking use included cigarettes. She started smoking about 64 years ago. She has a 0.1 pack-year smoking history. She has never used smokeless tobacco. She reports that she does not drink alcohol and does not use drugs.    OBJECTIVE    Vital Signs:   /76   Pulse 61   Ht 162.6 cm (64.02\")   Wt 52.3 kg (115 lb 3.2 oz)   SpO2 96%   BMI 19.76 kg/m²     Physical Exam  Constitutional:       Appearance: Normal appearance.   HENT:      Right Ear: Tympanic membrane normal.      Left Ear: There is impacted cerumen.   Cardiovascular:      Rate and Rhythm: Normal rate and regular rhythm.      Heart sounds: Normal heart sounds. No murmur heard.  Pulmonary:      Effort: Pulmonary effort is normal.      Breath sounds: Normal breath sounds.   Musculoskeletal:      Right lower leg: No edema.      Left lower leg: No edema.   Skin:     General: Skin is warm and dry.   Neurological:      Mental Status: She is alert.   Psychiatric:         Mood and Affect: Mood normal.         Behavior: Behavior normal.         Thought Content: Thought content normal.            The following data was reviewed by: Hay Serna MD on 12/12/2024:  CMP          6/5/2024    11:13 "   CMP   Glucose 89    BUN 19    Creatinine 0.89    Sodium 139    Potassium 4.5    Chloride 100    Calcium 10.3    Total Protein 7.3    Albumin 4.6    Globulin 2.7    Total Bilirubin 0.9    Alkaline Phosphatase 80    AST (SGOT) 20    ALT (SGPT) 10    BUN/Creatinine Ratio 21.3      CBC w/diff          6/5/2024    11:13   CBC w/Diff   WBC 5.39    RBC 4.39    Hemoglobin 13.5    Hematocrit 41.1    MCV 93.6    MCH 30.8    MCHC 32.8    RDW 12.2    Platelets 201    Neutrophil Rel % 58.7    Lymphocyte Rel % 30.1    Monocyte Rel % 8.3    Eosinophil Rel % 2.0    Basophil Rel % 0.7      Lipid Panel          6/5/2024    11:13   Lipid Panel   Total Cholesterol 150    Triglycerides 125    HDL Cholesterol 54    VLDL Cholesterol 22    LDL Cholesterol  74                      ASSESSMENT & PLAN        Paroxysmal atrial fibrillation  Patient has history of atrial fibrillation and had brain imaging that shows evidence of bilateral cerebellar strokes as well as a right parietal stroke.  She also had recent fall with intra cerebral hemorrhage.  Fortunately she had stopped her Eliquis prior to fall.  Advise her to stay off of Eliquis for now.  I think it would be good for her to touch base with her cardiologist.  Orders:    Ambulatory Referral to Cardiology    History of stroke         Intraventricular hemorrhage         Frequent UTI         Chronic migraine without aura without status migrainosus, not intractable  Headaches are stable. Continue current management.            Essential hypertension  Hypertension is stable and controlled  Continue current treatment regimen.  Blood pressure will be reassessed in 3 months.         Anxiety  Takes xanax as needed for insomnia. Discussed risk of falls in older adults.  She accepts this risk and wants to continue this medication.  If she has any issues with balance or falls we will need to stop.  Avoid alcohol and other sedating medications. Bryn reviewed.       Takes dietary supplements  Takes  very small amount of potassium and magnesium for leg cramps with relief of symptoms. Checking BMP.  Orders:    Basic Metabolic Panel              Health Maintenance Due   Topic Date Due    RSV Vaccine - Adults (1 - 1-dose 75+ series) Never done    ZOSTER VACCINE (3 of 3) 07/23/2019        Follow Up  No follow-ups on file.    Patient/family had no further questions at this time and verbalized understanding of the plan discussed today.

## 2025-01-29 DIAGNOSIS — G43.709 CHRONIC MIGRAINE WITHOUT AURA WITHOUT STATUS MIGRAINOSUS, NOT INTRACTABLE: ICD-10-CM

## 2025-01-30 RX ORDER — ESTRADIOL 0.1 MG/G
1 CREAM VAGINAL DAILY
Qty: 42.5 G | Refills: 2 | Status: SHIPPED | OUTPATIENT
Start: 2025-01-30

## 2025-01-30 RX ORDER — SUMATRIPTAN SUCCINATE 100 MG/1
TABLET ORAL
Qty: 30 TABLET | Refills: 2 | Status: SHIPPED | OUTPATIENT
Start: 2025-01-30

## 2025-02-07 RX ORDER — EZETIMIBE AND SIMVASTATIN 10; 20 MG/1; MG/1
1 TABLET ORAL NIGHTLY
Qty: 90 TABLET | Refills: 3 | Status: SHIPPED | OUTPATIENT
Start: 2025-02-07

## 2025-03-14 ENCOUNTER — OFFICE VISIT (OUTPATIENT)
Dept: INTERNAL MEDICINE | Facility: CLINIC | Age: OVER 89
End: 2025-03-14
Payer: MEDICARE

## 2025-03-14 VITALS
HEART RATE: 68 BPM | HEIGHT: 64 IN | WEIGHT: 113 LBS | OXYGEN SATURATION: 96 % | BODY MASS INDEX: 19.29 KG/M2 | DIASTOLIC BLOOD PRESSURE: 64 MMHG | SYSTOLIC BLOOD PRESSURE: 122 MMHG

## 2025-03-14 DIAGNOSIS — I48.0 PAROXYSMAL ATRIAL FIBRILLATION: ICD-10-CM

## 2025-03-14 DIAGNOSIS — K21.9 GASTROESOPHAGEAL REFLUX DISEASE, UNSPECIFIED WHETHER ESOPHAGITIS PRESENT: ICD-10-CM

## 2025-03-14 DIAGNOSIS — R05.3 CHRONIC COUGH: ICD-10-CM

## 2025-03-14 DIAGNOSIS — F41.1 GENERALIZED ANXIETY DISORDER: Primary | ICD-10-CM

## 2025-03-14 DIAGNOSIS — G43.709 CHRONIC MIGRAINE WITHOUT AURA WITHOUT STATUS MIGRAINOSUS, NOT INTRACTABLE: ICD-10-CM

## 2025-03-14 DIAGNOSIS — Z79.899 HIGH RISK MEDICATION USE: ICD-10-CM

## 2025-03-14 DIAGNOSIS — I10 ESSENTIAL HYPERTENSION: ICD-10-CM

## 2025-03-14 DIAGNOSIS — L98.9 SKIN LESION OF LOWER EXTREMITY: ICD-10-CM

## 2025-03-14 PROCEDURE — 99214 OFFICE O/P EST MOD 30 MIN: CPT | Performed by: STUDENT IN AN ORGANIZED HEALTH CARE EDUCATION/TRAINING PROGRAM

## 2025-03-14 PROCEDURE — 1126F AMNT PAIN NOTED NONE PRSNT: CPT | Performed by: STUDENT IN AN ORGANIZED HEALTH CARE EDUCATION/TRAINING PROGRAM

## 2025-03-14 RX ORDER — PROMETHAZINE HYDROCHLORIDE AND CODEINE PHOSPHATE 6.25; 1 MG/5ML; MG/5ML
5 SYRUP ORAL DAILY PRN
Qty: 118 ML | Refills: 0 | Status: SHIPPED | OUTPATIENT
Start: 2025-03-14

## 2025-03-14 NOTE — ASSESSMENT & PLAN NOTE
-On Xanax as needed but hardly ever takes. Took recently for vertigo.   - takes at night for insomnia.

## 2025-03-14 NOTE — PATIENT INSTRUCTIONS
Dermatologists    [facility]  [address] [place] [address] [phone]    [facility]  [address]  [place], [place] [phone]    Dr. Lucia Virk  [address] [place] [address] [phone]    Dr. Zeina Azul  [address] [address] [phone]

## 2025-03-14 NOTE — PROGRESS NOTES
Hay Serna M.D.  Internal Medicine  [facility]  [address], [address]  [place] [address]  [phone]      Chief Complaint  Follow-up (3 mth follow up /), Sinus Problem, Cough, and Cyst (Knot on left lower leg that is hard and sore )    SUBJECTIVE    History of Present Illness    Sirisha Auguste is a 89 y.o. female  with migraines, anxiety, GERD, hyperlipidemia, A-fib who presents to the office today as an established patient that last saw me on 12/12/2024.     Here with daughter    History of Present Illness  She has been dealing with a persistent cough at night, which she believes is due to postnasal drip from her rhinitis and allergies. She has undergone allergy testing. Her symptoms have gotten worse recently. She avoids taking additional acetaminophen. In the past, promethazine with codeine has helped her. She finds the daytime coughing exhausting. Over-the-counter medications like Robitussin and guaifenesin haven't worked. She has been advised to use a nasal saline rinse and to elevate the head of her bed. Medications like Fluticasone, Zyrtec, and Claritin haven't provided much relief, and Montelukast didn't work either. Tessalon Perles, which were prescribed by Dr. Monk, didn't help. 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 She keeps her windows closed and uses an air filter. She doesn't have fevers, chills, or thick discharge, but she does have a constant clear discharge.    She also has a sore on her leg that was black one day and has been there for about 3 weeks. She applies cream and lotion because her skin is dry. The sore hasn't grown, and she hasn't seen a dermatologist because it's difficult for her to get there. The sore is tender, especially when she crosses her legs. She has tried using over-the-counter wart remover liquid. She bruises easily, which she attributes to aging.    Her migraines have gotten better, and she  doesn't vomit as much as she used to. However, she has had sudden episodes where she can't walk or see, and she needs help during these times. She has had 2-3 MRIs and was referred to a neurologist. She was diagnosed with Meniere's disease and hasn't had a vertigo attack recently, though she had a minor episode on Wednesday. She takes Imitrex as needed, which works for her. She takes half a tablet at night. She was referred to [facility] but did not go because it is difficult for her to go to appointments.     She saw cardiologist, Dr. Magallanse who recommended Eliquis, but she stopped taking it because she was bleeding frequently. She has been on metoprolol 50 mg for a long time.    She experiences occasional anxiety and rarely takes Xanax. She took half a tablet on Wednesday during a vertigo attack. She takes half a tablet at night but still has trouble sleeping, often going to bed at 1:00 AM and waking up at 4:00 AM. She was previously advised to take a whole tablet of Xanax at night but reduced the dose to half. She takes Tylenol at night for arthritis, which sometimes helps.    She has lost about 30 pounds, going from 135 to 110 pounds. She doesn't have much of an appetite and eats an egg every morning.    She is on a long-term antibiotic regimen nightly and has no issues with urination. She was given a sterile package to collect a urine sample if needed. She uses hearing aids, which help, but the batteries only last 2-3 hours. She doesn't wear them two-thirds of the time but can hear adequately without them.      Review of Systems    Allergies   Allergen Reactions    Atorvastatin Myalgia    Sulfa Antibiotics Other (See Comments)     unknown    Contrast Dye (Echo Or Unknown Ct/Mr) Nausea And Vomiting     Patients daughter reports the allergy to MRI IV contrast which happened many years ago , in the early 90's, which caused patient to be hospitalized for an 3 extra days. (Patient and her daughter would  consider contrast allergy premedication in the future as of 8/18/2023)         Outpatient Medications Marked as Taking for the 3/14/25 encounter (Office Visit) with Hay Serna MD   Medication Sig Dispense Refill    ALPRAZolam (XANAX) 0.5 MG tablet TAKE 1 TABLET TWICE DAILY AS NEEDED FOR ANXIETY. 40 tablet 0    diclofenac (VOLTAREN) 1 % gel gel Apply 4 g topically 4 (Four) Times a Day As Needed.      esomeprazole (nexIUM) 40 MG capsule TAKE 1 CAPSULE EVERY MORNING BEFORE BREAKFAST 90 capsule 3    estradiol (ESTRACE VAGINAL) 0.1 MG/GM vaginal cream Insert 1 g into the vagina Daily. 42.5 g 2    ezetimibe-simvastatin (VYTORIN) 10-20 MG per tablet TAKE 1 TABLET EVERY NIGHT 90 tablet 3    metoprolol succinate XL (TOPROL-XL) 50 MG 24 hr tablet TAKE 1 TABLET EVERY DAY 90 tablet 3    Probiotic Product (PROBIOTIC PO) Take  by mouth Every Other Day.      saccharomyces boulardii (Florastor) 250 MG capsule Take 1 capsule by mouth 2 (Two) Times a Day. 90 capsule 2    SUMAtriptan (IMITREX) 100 MG tablet Take one tablet at onset of headache. May repeat dose one time in 2 hours if headache not relieved. 30 tablet 2    VITAMIN D, CHOLECALCIFEROL, PO Take 2,000 Units by mouth Daily.      [DISCONTINUED] benzonatate (TESSALON) 200 MG capsule Take 1 capsule by mouth 3 (Three) Times a Day As Needed for Cough. 30 capsule 0    [DISCONTINUED] montelukast (SINGULAIR) 10 MG tablet Take 1 tablet by mouth Every Night. 90 tablet 3        Past Medical History:   Diagnosis Date    Anxiety 1985+/-    Arthritis     Bacteremia 07/13/2022    Breast cyst     E. coli colitis 07/14/2022    GERD (gastroesophageal reflux disease)     Hyperglycemia     Hyperlipidemia     Hypertension     Low back pain     Migraines     Osteopenia     RBBB     Recurrent vertigo 05/08/2017    Urinary tract infection     many times over years     Past Surgical History:   Procedure Laterality Date    BLADDER SURGERY      BREAST CYST ASPIRATION      COLONOSCOPY N/A 7/14/2022     "Procedure: COLONOSCOPY TO 20CM WITH COLD BIOPSIES;  Surgeon: Henrik Gaston MD;  Location: [facility] ENDOSCOPY;  Service: Gastroenterology;  Laterality: N/A;  PRE- ABNORMAL CT, BLACK TARRY STOOL, ABDOMINAL PAIN  POST- COLITIS    ENDOSCOPY N/A 7/12/2022    Procedure: ESOPHAGOGASTRODUODENOSCOPY WITH COLD BIOPSIES;  Surgeon: Yoselyn Gates MD;  Location: [facility] ENDOSCOPY;  Service: Gastroenterology;  Laterality: N/A;  PRE- DYSPEPSIA, MELENA  POST- MILD GASTRITIS, SMALL HIATAL HERNIA    HYSTERECTOMY      OOPHORECTOMY      SIGMOIDOSCOPY N/A 5/8/2023    Procedure: SIGMOIDOSCOPY FLEXIBLE TO LEFT TRANSVERSE COLON;  Surgeon: Yoselyn Gates MD;  Location: [facility] ENDOSCOPY;  Service: Gastroenterology;  Laterality: N/A;  PRE- ABNORMAL CT OF COLON, FOLLOW-UP COLITIS  POST- HEMORRHOIDS     Family History   Problem Relation Age of Onset    Cancer Sister         breast    Breast cancer Sister     Cancer Maternal Aunt         breast    Breast cancer Maternal Aunt     Anxiety disorder Mother         no panic attacks known    Arthritis Mother         very bad case    reports that she quit smoking about 64 years ago. Her smoking use included cigarettes. She started smoking about 65 years ago. She has a 0.1 pack-year smoking history. She has never used smokeless tobacco. She reports that she does not drink alcohol and does not use drugs.    OBJECTIVE    Vital Signs:   /64   Pulse 68   Ht 162.6 cm (64.02\")   Wt 51.3 kg (113 lb)   SpO2 96%   BMI 19.39 kg/m²     Physical Exam  Constitutional:       General: She is not in acute distress.     Appearance: Normal appearance.   Cardiovascular:      Rate and Rhythm: Normal rate and regular rhythm.      Heart sounds: Normal heart sounds. No murmur heard.  Pulmonary:      Effort: Pulmonary effort is normal. No respiratory distress.      Breath sounds: Normal breath sounds.   Musculoskeletal:      Right lower leg: No edema.      Left lower leg: No edema.   Skin:     General: Skin is " warm and dry.      Comments: Crusted lesion on lower leg   Neurological:      Mental Status: She is alert. Mental status is at baseline.   Psychiatric:         Mood and Affect: Mood normal.         Behavior: Behavior normal.         Thought Content: Thought content normal.          Physical Exam      The following data was reviewed by: Hay Serna MD on 03/14/2025:  CMP          6/5/2024    11:13 12/12/2024    11:13   CMP   Glucose 89  94    BUN 19  24    Creatinine 0.89  0.94    EGFR 62.4  58.1    Sodium 139  137    Potassium 4.5  4.5    Chloride 100  101    Calcium 10.3  10.0    Total Protein 7.3     Albumin 4.6     Globulin 2.7     Total Bilirubin 0.9     Alkaline Phosphatase 80     AST (SGOT) 20     ALT (SGPT) 10     Albumin/Globulin Ratio 1.7     BUN/Creatinine Ratio 21.3  25.5      Lipid Panel          6/5/2024    11:13   Lipid Panel   Total Cholesterol 150    Triglycerides 125    HDL Cholesterol 54    VLDL Cholesterol 22    LDL Cholesterol  74                      ASSESSMENT & PLAN        Generalized anxiety disorder  -On Xanax as needed but hardly ever takes. Took recently for vertigo.   - takes at night for insomnia.        Chronic migraine without aura without status migrainosus, not intractable  Stable. With visual disturbances  On Imitrex and takes half to one quarter.  Have discussed with patient this is generally not recommended for patient with stroke history.         Paroxysmal atrial fibrillation  On metoprolol.  History of mechanical fall resulting in intraventricular hemorrhage.  No longer on Eliquis but on aspirin.       Essential hypertension  Hypertension is stable and controlled  Continue current treatment regimen.  On metoprolol       Skin lesion of lower extremity  -advised to follow with dermatology for this. Concern for skin precancer or cancer.       High risk medication use  - Uses Xanax sparingly, advised to limit use  - Suggested melatonin for sleep  - Conduct urine drug screen   -  [place] reviewed  Orders:    Urine Drug Screen - Urine, Clean Catch    Chronic cough  - Likely due to postnasal drip from allergies  - Advised nasal saline rinse  - Elevate head of bed  - Continue Flonase  - Consider ipratropium nasal spray  - Prescribe promethazine with codeine   Orders:    promethazine-codeine (PHENERGAN with CODEINE) 6.25-10 MG/5ML syrup; Take 5 mL by mouth Daily As Needed for Cough.    Gastroesophageal reflux disease, unspecified whether esophagitis present  - Continue Nexium           Assessment & Plan        Health Maintenance Due   Topic Date Due    RSV Vaccine - Adults (1 - 1-dose 75+ series) Never done    ZOSTER VACCINE (3 of 3) 07/23/2019    COVID-19 Vaccine (8 - 2024-25 season) 03/18/2025    ANNUAL WELLNESS VISIT  06/05/2025        Follow Up  Return in about 3 months (around 6/14/2025) for Recheck.    Patient/family had no further questions at this time and verbalized understanding of the plan discussed today.     Patient or patient representative verbalized consent for the use of Ambient Listening during the visit with  Hay Serna MD for chart documentation. 3/16/2025  10:49 EDT

## 2025-03-14 NOTE — ASSESSMENT & PLAN NOTE
Stable. With visual disturbances  On Imitrex and takes half to one quarter.  Have discussed with patient this is generally not recommended for patient with stroke history.

## 2025-03-15 LAB
AMPHETAMINES UR QL SCN: NEGATIVE NG/ML
BARBITURATES UR QL SCN: NEGATIVE NG/ML
BENZODIAZ UR QL SCN: POSITIVE NG/ML
BZE UR QL SCN: NEGATIVE NG/ML
CANNABINOIDS UR QL SCN: NEGATIVE NG/ML
CREAT UR-MCNC: 71.7 MG/DL (ref 20–300)
LABORATORY COMMENT REPORT: ABNORMAL
METHADONE UR QL SCN: NEGATIVE NG/ML
OPIATES UR QL SCN: NEGATIVE NG/ML
OXYCODONE+OXYMORPHONE UR QL SCN: NEGATIVE NG/ML
PCP UR QL: NEGATIVE NG/ML
PH UR: 5.9 [PH] (ref 4.5–8.9)
PROPOXYPH UR QL SCN: NEGATIVE NG/ML

## 2025-03-16 RX ORDER — IPRATROPIUM BROMIDE 42 UG/1
2 SPRAY, METERED NASAL 4 TIMES DAILY
Qty: 15 ML | Refills: 3 | Status: SHIPPED | OUTPATIENT
Start: 2025-03-16

## 2025-03-31 ENCOUNTER — TELEPHONE (OUTPATIENT)
Dept: INTERNAL MEDICINE | Facility: CLINIC | Age: OVER 89
End: 2025-03-31

## 2025-03-31 RX ORDER — DEXTROMETHORPHAN HYDROBROMIDE AND PROMETHAZINE HYDROCHLORIDE 15; 6.25 MG/5ML; MG/5ML
5 SYRUP ORAL
Qty: 118 ML | Refills: 0 | OUTPATIENT
Start: 2025-03-31

## 2025-03-31 NOTE — TELEPHONE ENCOUNTER
"Spoke to nader . States that pharmacy never received rx. Cough syrup  Do not believe that this medicine is being made anymore.  It has been more that 15 days ago and she states pts coughs \"all the time.because of allergies\"  Told her you were not here today and it would have to wait until tomorrow to see if you wanted to send something else in or did you want to see her for persistent cough    "

## 2025-03-31 NOTE — TELEPHONE ENCOUNTER
Caller: Marcos Aiken    Relationship to patient: Emergency Contact    Best call back number: [phone]    Patient is needing: PATIENT DAUGHTER IS ADVISING LORE STATES THEY HAVEN'T RECEIVED THE PROMETHAZINE-CODEINE COUGH SYRUP.

## 2025-03-31 NOTE — TELEPHONE ENCOUNTER
For allergies she can try a non-drowsy antihistamine such as Xyzal, Claritin, Allegra and a nasal steroid such as Flonase or Nasacort. These are available over the counter.

## 2025-04-02 RX ORDER — DEXTROMETHORPHAN HYDROBROMIDE AND PROMETHAZINE HYDROCHLORIDE 15; 6.25 MG/5ML; MG/5ML
1.25 SYRUP ORAL 4 TIMES DAILY PRN
Qty: 118 ML | Refills: 0 | Status: SHIPPED | OUTPATIENT
Start: 2025-04-02 | End: 2025-04-03 | Stop reason: SDUPTHER

## 2025-04-07 RX ORDER — ESTRADIOL 0.1 MG/G
CREAM VAGINAL
Qty: 2 EACH | Refills: 3 | Status: SHIPPED | OUTPATIENT
Start: 2025-04-07

## 2025-06-19 DIAGNOSIS — N39.0 FREQUENT UTI: ICD-10-CM

## 2025-06-19 RX ORDER — NITROFURANTOIN 25; 75 MG/1; MG/1
CAPSULE ORAL
Qty: 90 CAPSULE | Refills: 3 | Status: SHIPPED | OUTPATIENT
Start: 2025-06-19

## 2025-06-24 ENCOUNTER — TELEMEDICINE (OUTPATIENT)
Dept: INTERNAL MEDICINE | Facility: CLINIC | Age: OVER 89
End: 2025-06-24
Payer: MEDICARE

## 2025-06-24 DIAGNOSIS — F41.9 ANXIETY: ICD-10-CM

## 2025-06-24 DIAGNOSIS — M15.0 PRIMARY OSTEOARTHRITIS INVOLVING MULTIPLE JOINTS: ICD-10-CM

## 2025-06-24 DIAGNOSIS — R25.2 NOCTURNAL FOOT CRAMPS: ICD-10-CM

## 2025-06-24 DIAGNOSIS — F41.1 GENERALIZED ANXIETY DISORDER: Primary | ICD-10-CM

## 2025-06-24 DIAGNOSIS — M19.90 ARTHRITIS: ICD-10-CM

## 2025-06-24 DIAGNOSIS — I10 ESSENTIAL HYPERTENSION: ICD-10-CM

## 2025-06-24 DIAGNOSIS — R05.3 CHRONIC COUGH: ICD-10-CM

## 2025-06-24 DIAGNOSIS — G43.709 CHRONIC MIGRAINE WITHOUT AURA WITHOUT STATUS MIGRAINOSUS, NOT INTRACTABLE: ICD-10-CM

## 2025-06-24 DIAGNOSIS — E78.2 MIXED HYPERLIPIDEMIA: ICD-10-CM

## 2025-06-24 PROCEDURE — G2211 COMPLEX E/M VISIT ADD ON: HCPCS | Performed by: STUDENT IN AN ORGANIZED HEALTH CARE EDUCATION/TRAINING PROGRAM

## 2025-06-24 PROCEDURE — 1126F AMNT PAIN NOTED NONE PRSNT: CPT | Performed by: STUDENT IN AN ORGANIZED HEALTH CARE EDUCATION/TRAINING PROGRAM

## 2025-06-24 PROCEDURE — 99214 OFFICE O/P EST MOD 30 MIN: CPT | Performed by: STUDENT IN AN ORGANIZED HEALTH CARE EDUCATION/TRAINING PROGRAM

## 2025-06-24 RX ORDER — EZETIMIBE 10 MG/1
10 TABLET ORAL DAILY
Qty: 90 TABLET | Refills: 2 | Status: SHIPPED | OUTPATIENT
Start: 2025-06-24 | End: 2025-06-24

## 2025-06-24 RX ORDER — EZETIMIBE 10 MG/1
10 TABLET ORAL DAILY
Qty: 90 TABLET | Refills: 2 | Status: SHIPPED | OUTPATIENT
Start: 2025-06-24

## 2025-06-24 RX ORDER — BENZONATATE 200 MG/1
200 CAPSULE ORAL 3 TIMES DAILY PRN
Qty: 30 CAPSULE | Refills: 0 | Status: SHIPPED | OUTPATIENT
Start: 2025-06-24

## 2025-06-24 RX ORDER — ALPRAZOLAM 0.5 MG
0.5 TABLET ORAL 2 TIMES DAILY PRN
Qty: 60 TABLET | Refills: 0 | Status: SHIPPED | OUTPATIENT
Start: 2025-06-24

## 2025-06-24 NOTE — ASSESSMENT & PLAN NOTE
-On ezetimibe-simvastatin  - Stop simvastatin given myalgias.  Continue ezetimibe.  Recheck in 3 months.

## 2025-06-24 NOTE — ASSESSMENT & PLAN NOTE
-On Xanax as needed but hardly ever takes.   - takes at night for insomnia.   -Discussed sleep hygiene measures.

## 2025-06-24 NOTE — ASSESSMENT & PLAN NOTE
Stable. With visual disturbances  On Imitrex and takes half to one quarter.  Have discussed with patient this is generally not recommended for patient with stroke history.  She voiced understanding's.

## 2025-06-24 NOTE — PROGRESS NOTES
Hay Serna M.D.  Internal Medicine  [facility]  [address], [address]  [place] [address]  [phone]      Chief Complaint  Follow up    SUBJECTIVE  Sirisha Auguste is a 89 y.o. female  with migraines, anxiety, GERD, hyperlipidemia, A-fib who presents to the office today as an established patient that last saw me on 3/14/2025.     Here with daughter    Mode of Visit: Video  Location of patient: home  [place] of provider[place] [facility]  You have chosen to receive care through a telehealth visit.  Does the patient consent to use a video/audio connection their medical care today? yes  The visit included audio and video interaction. No technical issues occurred during this visit.     Cough  Chronicity:  Chronic (from post nasal drip)  Associated symptoms: chest pain    Associated symptoms comment:  Cough causes chest pain  Additional information:  Astelin made her throt hurt. Useed to take cough syrup with codeine. Tessalon pearles        Insomnia: Not controlled. Takes one half to one quarter Xanax nighty. Patient describes symptoms as early morning awakening. Patient has found moderate relief with xanax and cough medicine. Associated symptoms include: anxiety. Symptoms have stabilized.    Worsening vision    Anxiety: Patient is here for evaluation of anxiety.  She has the following anxiety symptoms: racing thoughts,  Insomnia.   Chronic issue.         Migraine: occur several times per month.  Controlled. Has vertigo. Late at night or early AM. On triptan as needed. Keeps her awake. Nurtec and ubrelvy did not help. Triptans only help. Aware of stroke risk.     History of Present Illness      Review of Systems   Respiratory:  Positive for cough.    Cardiovascular:  Positive for chest pain.       Allergies   Allergen Reactions    Atorvastatin Myalgia    Sulfa Antibiotics Other (See Comments)     unknown    Contrast Dye (Echo Or Unknown Ct/Mr) Nausea And Vomiting     Patients daughter  reports the allergy to MRI IV contrast which happened many years ago , in the early 90's, which caused patient to be hospitalized for an 3 extra days. (Patient and her daughter would consider contrast allergy premedication in the future as of 8/18/2023)         Outpatient Medications Marked as Taking for the 6/24/25 encounter (Telemedicine) with Hay Serna MD   Medication Sig Dispense Refill    ALPRAZolam (XANAX) 0.5 MG tablet Take 1 tablet by mouth 2 (Two) Times a Day As Needed for Anxiety. 60 tablet 0    ezetimibe (Zetia) 10 MG tablet Take 1 tablet by mouth Daily. 90 tablet 2        Past Medical History:   Diagnosis Date    Anxiety 1985+/-    Arthritis     Bacteremia 07/13/2022    Breast cyst     E. coli colitis 07/14/2022    GERD (gastroesophageal reflux disease)     Hyperglycemia     Hyperlipidemia     Hypertension     Low back pain     Migraines     Osteopenia     RBBB     Recurrent vertigo 05/08/2017    Urinary tract infection     many times over years     Past Surgical History:   Procedure Laterality Date    BLADDER SURGERY      BREAST CYST ASPIRATION      COLONOSCOPY N/A 7/14/2022    Procedure: COLONOSCOPY TO 20CM WITH COLD BIOPSIES;  Surgeon: Henrik Gaston MD;  Location: [facility] ENDOSCOPY;  Service: Gastroenterology;  Laterality: N/A;  PRE- ABNORMAL CT, BLACK TARRY STOOL, ABDOMINAL PAIN  POST- COLITIS    ENDOSCOPY N/A 7/12/2022    Procedure: ESOPHAGOGASTRODUODENOSCOPY WITH COLD BIOPSIES;  Surgeon: Yoselyn Gates MD;  Location: [facility] ENDOSCOPY;  Service: Gastroenterology;  Laterality: N/A;  PRE- DYSPEPSIA, MELENA  POST- MILD GASTRITIS, SMALL HIATAL HERNIA    HYSTERECTOMY      OOPHORECTOMY      SIGMOIDOSCOPY N/A 5/8/2023    Procedure: SIGMOIDOSCOPY FLEXIBLE TO LEFT TRANSVERSE COLON;  Surgeon: Yoselyn Gates MD;  Location: [facility] ENDOSCOPY;  Service: Gastroenterology;  Laterality: N/A;  PRE- ABNORMAL CT OF COLON, FOLLOW-UP COLITIS  POST- HEMORRHOIDS     Family History   Problem Relation Age of Onset     Cancer Sister         breast    Breast cancer Sister     Cancer Maternal Aunt         breast    Breast cancer Maternal Aunt     Anxiety disorder Mother         no panic attacks known    Arthritis Mother         very bad case    reports that she quit smoking about 65 years ago. Her smoking use included cigarettes. She started smoking about 65 years ago. She has a 0.1 pack-year smoking history. She has never used smokeless tobacco. She reports that she does not drink alcohol and does not use drugs.    OBJECTIVE    Vital Signs:   There were no vitals taken for this visit.    Physical Exam  Constitutional:       General: She is not in acute distress.     Appearance: Normal appearance.   Pulmonary:      Effort: Pulmonary effort is normal. No respiratory distress.   Neurological:      Mental Status: She is alert. Mental status is at baseline.   Psychiatric:         Mood and Affect: Mood normal.         Behavior: Behavior normal.         Thought Content: Thought content normal.          Physical Exam      The following data was reviewed by: Hay Serna MD on 06/24/2025:  CMP          12/12/2024    11:13   CMP   Glucose 94    BUN 24    Creatinine 0.94    EGFR 58.1    Sodium 137    Potassium 4.5    Chloride 101    Calcium 10.0    BUN/Creatinine Ratio 25.5                          ASSESSMENT & PLAN        Generalized anxiety disorder  -On Xanax as needed but hardly ever takes.   - takes at night for insomnia.   -Discussed sleep hygiene measures.        Chronic migraine without aura without status migrainosus, not intractable  Stable. With visual disturbances  On Imitrex and takes half to one quarter.  Have discussed with patient this is generally not recommended for patient with stroke history.  She voiced understanding's.            Mixed hyperlipidemia  -On ezetimibe-simvastatin  - Stop simvastatin given myalgias.  Continue ezetimibe.  Recheck in 3 months.         Essential hypertension  Hypertension is stable and  controlled  Continue current treatment regimen.  On metoprolol          Chronic cough  -She is to take codeine cough syrup.  Discussed this is generally not advised.  She has tried multiple medications in the past.  Try Tessalon Perles.  Orders:    benzonatate (TESSALON) 200 MG capsule; Take 1 capsule by mouth 3 (Three) Times a Day As Needed for Cough.    Anxiety  -On Xanax as needed.  Anxiety controlled.  Continue current management.  Orders:    ALPRAZolam (XANAX) 0.5 MG tablet; Take 1 tablet by mouth 2 (Two) Times a Day As Needed for Anxiety.    Arthritis  She is wondering if there have been any new medications discovered for arthritis.  Right now she is taking Tylenol.  She can alternate Tylenol with ibuprofen or Aleve.  Primary osteoarthritis involving multiple joints  Left shoulder. OTC as needed       Nocturnal foot cramps  Mag and K do not help. Has to stretch.  She has history of statin myalgia.  Discussed trying her off a statin to see if it resolves.  Continue ezetimibe.         Assessment & Plan        Health Maintenance Due   Topic Date Due    DXA SCAN  Never done    RSV Vaccine - Adults (1 - 1-dose 75+ series) Never done    ZOSTER VACCINE (3 of 3) 07/23/2019    COVID-19 Vaccine (8 - 2024-25 season) 03/18/2025    ANNUAL WELLNESS VISIT  06/05/2025    LIPID PANEL  06/05/2025        Follow Up  Return in about 3 months (around 9/24/2025).    Patient/family had no further questions at this time and verbalized understanding of the plan discussed today.     Patient or patient representative verbalized consent for the use of Ambient Listening during the visit with  Hay Serna MD for chart documentation. 6/24/2025  12:53 EDT

## 2025-07-20 DIAGNOSIS — I48.20 CHRONIC ATRIAL FIBRILLATION WITH RAPID VENTRICULAR RESPONSE: ICD-10-CM

## 2025-07-21 RX ORDER — METOPROLOL SUCCINATE 50 MG/1
50 TABLET, EXTENDED RELEASE ORAL DAILY
Qty: 90 TABLET | Refills: 3 | Status: SHIPPED | OUTPATIENT
Start: 2025-07-21

## 2025-07-31 ENCOUNTER — APPOINTMENT (OUTPATIENT)
Dept: GENERAL RADIOLOGY | Facility: HOSPITAL | Age: OVER 89
DRG: 308 | End: 2025-07-31
Payer: MEDICARE

## 2025-07-31 ENCOUNTER — APPOINTMENT (OUTPATIENT)
Dept: CT IMAGING | Facility: HOSPITAL | Age: OVER 89
DRG: 308 | End: 2025-07-31
Payer: MEDICARE

## 2025-07-31 ENCOUNTER — HOSPITAL ENCOUNTER (INPATIENT)
Facility: HOSPITAL | Age: OVER 89
LOS: 2 days | Discharge: HOME HEALTH | DRG: 308 | End: 2025-08-05
Attending: EMERGENCY MEDICINE | Admitting: INTERNAL MEDICINE
Payer: MEDICARE

## 2025-07-31 DIAGNOSIS — I48.91 ATRIAL FIBRILLATION, UNSPECIFIED TYPE: ICD-10-CM

## 2025-07-31 DIAGNOSIS — R26.89 BALANCE PROBLEMS: ICD-10-CM

## 2025-07-31 DIAGNOSIS — E87.1 HYPONATREMIA: ICD-10-CM

## 2025-07-31 DIAGNOSIS — Z09 FOLLOW-UP EXAM: Primary | ICD-10-CM
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
## 2025-07-31 DIAGNOSIS — R79.89 ELEVATED TROPONIN: ICD-10-CM

## 2025-07-31 DIAGNOSIS — R53.1 GENERAL WEAKNESS: ICD-10-CM

## 2025-07-31 DIAGNOSIS — E83.42 HYPOMAGNESEMIA: ICD-10-CM

## 2025-07-31 PROBLEM — H81.09 MENIERE'S DISEASE: Status: ACTIVE | Noted: 2025-07-31

## 2025-07-31 PROBLEM — E83.52 HYPERCALCEMIA: Status: ACTIVE | Noted: 2025-07-31

## 2025-07-31 LAB
ALBUMIN SERPL-MCNC: 4.5 G/DL (ref 3.5–5.2)
ALBUMIN/GLOB SERPL: 1.4 G/DL
ALP SERPL-CCNC: 76 U/L (ref 39–117)
ALT SERPL W P-5'-P-CCNC: 9 U/L (ref 1–33)
ANION GAP SERPL CALCULATED.3IONS-SCNC: 10.2 MMOL/L (ref 5–15)
ANION GAP SERPL CALCULATED.3IONS-SCNC: 13.6 MMOL/L (ref 5–15)
APTT PPP: 26.4 SECONDS (ref 22.7–35.4)
AST SERPL-CCNC: 24 U/L (ref 1–32)
B PARAPERT DNA SPEC QL NAA+PROBE: NOT DETECTED
B PERT DNA SPEC QL NAA+PROBE: NOT DETECTED
BASOPHILS # BLD AUTO: 0.01 10*3/MM3 (ref 0–0.2)
BASOPHILS NFR BLD AUTO: 0.1 % (ref 0–1.5)
BILIRUB SERPL-MCNC: 1.4 MG/DL (ref 0–1.2)
BILIRUB UR QL STRIP: NEGATIVE
BUN SERPL-MCNC: 13 MG/DL (ref 8–23)
BUN SERPL-MCNC: 13 MG/DL (ref 8–23)
BUN/CREAT SERPL: 20.6 (ref 7–25)
BUN/CREAT SERPL: 26 (ref 7–25)
C PNEUM DNA NPH QL NAA+NON-PROBE: NOT DETECTED
CA-I SERPL ISE-MCNC: 1.26 MMOL/L (ref 1.15–1.35)
CALCIUM SPEC-SCNC: 10.4 MG/DL (ref 8.2–9.6)
CALCIUM SPEC-SCNC: 9.7 MG/DL (ref 8.2–9.6)
CHLORIDE SERPL-SCNC: 92 MMOL/L (ref 98–107)
CHLORIDE SERPL-SCNC: 95 MMOL/L (ref 98–107)
CLARITY UR: CLEAR
CO2 SERPL-SCNC: 21.4 MMOL/L (ref 22–29)
CO2 SERPL-SCNC: 21.8 MMOL/L (ref 22–29)
COLOR UR: YELLOW
CREAT SERPL-MCNC: 0.5 MG/DL (ref 0.57–1)
CREAT SERPL-MCNC: 0.63 MG/DL (ref 0.57–1)
DEPRECATED RDW RBC AUTO: 43.6 FL (ref 37–54)
EGFRCR SERPLBLD CKD-EPI 2021: 84.4 ML/MIN/1.73
EGFRCR SERPLBLD CKD-EPI 2021: 89.2 ML/MIN/1.73
EOSINOPHIL # BLD AUTO: 0.01 10*3/MM3 (ref 0–0.4)
EOSINOPHIL NFR BLD AUTO: 0.1 % (ref 0.3–6.2)
ERYTHROCYTE [DISTWIDTH] IN BLOOD BY AUTOMATED COUNT: 12.3 % (ref 12.3–15.4)
FLUAV SUBTYP SPEC NAA+PROBE: NOT DETECTED
FLUBV RNA NPH QL NAA+NON-PROBE: NOT DETECTED
GEN 5 1HR TROPONIN T REFLEX: 24 NG/L
GLOBULIN UR ELPH-MCNC: 3.2 GM/DL
GLUCOSE SERPL-MCNC: 116 MG/DL (ref 65–99)
GLUCOSE SERPL-MCNC: 116 MG/DL (ref 65–99)
GLUCOSE UR STRIP-MCNC: NEGATIVE MG/DL
HADV DNA SPEC NAA+PROBE: NOT DETECTED
HCOV 229E RNA SPEC QL NAA+PROBE: NOT DETECTED
HCOV HKU1 RNA SPEC QL NAA+PROBE: NOT DETECTED
HCOV NL63 RNA SPEC QL NAA+PROBE: NOT DETECTED
HCOV OC43 RNA SPEC QL NAA+PROBE: NOT DETECTED
HCT VFR BLD AUTO: 44.9 % (ref 34–46.6)
HGB BLD-MCNC: 14.3 G/DL (ref 12–15.9)
HGB UR QL STRIP.AUTO: NEGATIVE
HMPV RNA NPH QL NAA+NON-PROBE: NOT DETECTED
HPIV1 RNA ISLT QL NAA+PROBE: NOT DETECTED
HPIV2 RNA SPEC QL NAA+PROBE: NOT DETECTED
HPIV3 RNA NPH QL NAA+PROBE: NOT DETECTED
HPIV4 P GENE NPH QL NAA+PROBE: NOT DETECTED
IMM GRANULOCYTES # BLD AUTO: 0.01 10*3/MM3 (ref 0–0.05)
IMM GRANULOCYTES NFR BLD AUTO: 0.1 % (ref 0–0.5)
INR PPP: 1.1 (ref 0.9–1.1)
KETONES UR QL STRIP: ABNORMAL
LEUKOCYTE ESTERASE UR QL STRIP.AUTO: NEGATIVE
LIPASE SERPL-CCNC: 33 U/L (ref 13–60)
LYMPHOCYTES # BLD AUTO: 1.06 10*3/MM3 (ref 0.7–3.1)
LYMPHOCYTES NFR BLD AUTO: 15 % (ref 19.6–45.3)
M PNEUMO IGG SER IA-ACNC: NOT DETECTED
MAGNESIUM SERPL-MCNC: 1.5 MG/DL (ref 1.6–2.4)
MCH RBC QN AUTO: 30.6 PG (ref 26.6–33)
MCHC RBC AUTO-ENTMCNC: 31.8 G/DL (ref 31.5–35.7)
MCV RBC AUTO: 95.9 FL (ref 79–97)
MONOCYTES # BLD AUTO: 0.37 10*3/MM3 (ref 0.1–0.9)
MONOCYTES NFR BLD AUTO: 5.2 % (ref 5–12)
NEUTROPHILS NFR BLD AUTO: 5.59 10*3/MM3 (ref 1.7–7)
NEUTROPHILS NFR BLD AUTO: 79.5 % (ref 42.7–76)
NITRITE UR QL STRIP: NEGATIVE
NRBC BLD AUTO-RTO: 0 /100 WBC (ref 0–0.2)
NT-PROBNP SERPL-MCNC: 4708 PG/ML (ref 0–1800)
PH UR STRIP.AUTO: 6.5 [PH] (ref 5–8)
PLATELET # BLD AUTO: 198 10*3/MM3 (ref 140–450)
PMV BLD AUTO: 10.8 FL (ref 6–12)
POTASSIUM SERPL-SCNC: 3.8 MMOL/L (ref 3.5–5.2)
POTASSIUM SERPL-SCNC: 3.9 MMOL/L (ref 3.5–5.2)
PROCALCITONIN SERPL-MCNC: 0.07 NG/ML (ref 0–0.25)
PROT SERPL-MCNC: 7.7 G/DL (ref 6–8.5)
PROT UR QL STRIP: NEGATIVE
PROTHROMBIN TIME: 14.1 SECONDS (ref 11.7–14.2)
QT INTERVAL: 361 MS
QTC INTERVAL: 486 MS
RBC # BLD AUTO: 4.68 10*6/MM3 (ref 3.77–5.28)
RHINOVIRUS RNA SPEC NAA+PROBE: NOT DETECTED
RSV RNA NPH QL NAA+NON-PROBE: NOT DETECTED
SARS-COV-2 RNA NPH QL NAA+NON-PROBE: NOT DETECTED
SODIUM SERPL-SCNC: 127 MMOL/L (ref 136–145)
SODIUM SERPL-SCNC: 127 MMOL/L (ref 136–145)
SP GR UR STRIP: 1.02 (ref 1–1.03)
TROPONIN T % DELTA: 14
TROPONIN T NUMERIC DELTA: 3 NG/L
TROPONIN T SERPL HS-MCNC: 21 NG/L
UROBILINOGEN UR QL STRIP: ABNORMAL
WBC NRBC COR # BLD AUTO: 7.05 10*3/MM3 (ref 3.4–10.8)

## 2025-07-31 PROCEDURE — 93010 ELECTROCARDIOGRAM REPORT: CPT | Performed by: STUDENT IN AN ORGANIZED HEALTH CARE EDUCATION/TRAINING PROGRAM

## 2025-07-31 PROCEDURE — 93010 ELECTROCARDIOGRAM REPORT: CPT | Performed by: INTERNAL MEDICINE

## 2025-07-31 PROCEDURE — 83735 ASSAY OF MAGNESIUM: CPT | Performed by: EMERGENCY MEDICINE

## 2025-07-31 PROCEDURE — 99285 EMERGENCY DEPT VISIT HI MDM: CPT

## 2025-07-31 PROCEDURE — 85610 PROTHROMBIN TIME: CPT | Performed by: EMERGENCY MEDICINE

## 2025-07-31 PROCEDURE — G0378 HOSPITAL OBSERVATION PER HR: HCPCS

## 2025-07-31 PROCEDURE — 85730 THROMBOPLASTIN TIME PARTIAL: CPT | Performed by: EMERGENCY MEDICINE

## 2025-07-31 PROCEDURE — 25810000003 SODIUM CHLORIDE 0.9 % SOLUTION: Performed by: EMERGENCY MEDICINE

## 2025-07-31 PROCEDURE — 93005 ELECTROCARDIOGRAM TRACING: CPT | Performed by: EMERGENCY MEDICINE

## 2025-07-31 PROCEDURE — 0202U NFCT DS 22 TRGT SARS-COV-2: CPT | Performed by: EMERGENCY MEDICINE

## 2025-07-31 PROCEDURE — 93005 ELECTROCARDIOGRAM TRACING: CPT | Performed by: STUDENT IN AN ORGANIZED HEALTH CARE EDUCATION/TRAINING PROGRAM

## 2025-07-31 PROCEDURE — 80053 COMPREHEN METABOLIC PANEL: CPT | Performed by: EMERGENCY MEDICINE

## 2025-07-31 PROCEDURE — 84484 ASSAY OF TROPONIN QUANT: CPT | Performed by: EMERGENCY MEDICINE

## 2025-07-31 PROCEDURE — 85025 COMPLETE CBC W/AUTO DIFF WBC: CPT | Performed by: EMERGENCY MEDICINE

## 2025-07-31 PROCEDURE — 36415 COLL VENOUS BLD VENIPUNCTURE: CPT

## 2025-07-31 PROCEDURE — 70450 CT HEAD/BRAIN W/O DYE: CPT

## 2025-07-31 PROCEDURE — 83880 ASSAY OF NATRIURETIC PEPTIDE: CPT | Performed by: EMERGENCY MEDICINE

## 2025-07-31 PROCEDURE — 81003 URINALYSIS AUTO W/O SCOPE: CPT | Performed by: EMERGENCY MEDICINE

## 2025-07-31 PROCEDURE — 84145 PROCALCITONIN (PCT): CPT | Performed by: EMERGENCY MEDICINE

## 2025-07-31 PROCEDURE — 25010000002 METOPROLOL TARTRATE: Performed by: EMERGENCY MEDICINE

## 2025-07-31 PROCEDURE — 71045 X-RAY EXAM CHEST 1 VIEW: CPT

## 2025-07-31 PROCEDURE — 82330 ASSAY OF CALCIUM: CPT | Performed by: EMERGENCY MEDICINE

## 2025-07-31 PROCEDURE — 25810000003 SODIUM CHLORIDE 0.9 % SOLUTION: Performed by: STUDENT IN AN ORGANIZED HEALTH CARE EDUCATION/TRAINING PROGRAM

## 2025-07-31 PROCEDURE — 83690 ASSAY OF LIPASE: CPT | Performed by: EMERGENCY MEDICINE

## 2025-07-31 PROCEDURE — 25010000002 MAGNESIUM SULFATE 4 GM/100ML SOLUTION: Performed by: STUDENT IN AN ORGANIZED HEALTH CARE EDUCATION/TRAINING PROGRAM

## 2025-07-31 RX ORDER — METOPROLOL TARTRATE 25 MG/1
25 TABLET, FILM COATED ORAL ONCE
Status: COMPLETED | OUTPATIENT
Start: 2025-07-31 | End: 2025-07-31

## 2025-07-31 RX ORDER — SODIUM CHLORIDE 9 MG/ML
75 INJECTION, SOLUTION INTRAVENOUS CONTINUOUS
Status: ACTIVE | OUTPATIENT
Start: 2025-07-31 | End: 2025-08-01

## 2025-07-31 RX ORDER — POLYETHYLENE GLYCOL 3350 17 G/17G
17 POWDER, FOR SOLUTION ORAL DAILY PRN
Status: DISCONTINUED | OUTPATIENT
Start: 2025-07-31 | End: 2025-08-05 | Stop reason: HOSPADM

## 2025-07-31 RX ORDER — ALPRAZOLAM 0.5 MG
0.5 TABLET ORAL 2 TIMES DAILY PRN
Status: DISCONTINUED | OUTPATIENT
Start: 2025-07-31 | End: 2025-08-05 | Stop reason: HOSPADM

## 2025-07-31 RX ORDER — NITROFURANTOIN 25; 75 MG/1; MG/1
100 CAPSULE ORAL NIGHTLY
Status: DISCONTINUED | OUTPATIENT
Start: 2025-07-31 | End: 2025-08-05 | Stop reason: HOSPADM

## 2025-07-31 RX ORDER — AMOXICILLIN 250 MG
2 CAPSULE ORAL 2 TIMES DAILY PRN
Status: DISCONTINUED | OUTPATIENT
Start: 2025-07-31 | End: 2025-08-05 | Stop reason: HOSPADM

## 2025-07-31 RX ORDER — SODIUM CHLORIDE 9 MG/ML
40 INJECTION, SOLUTION INTRAVENOUS AS NEEDED
Status: DISCONTINUED | OUTPATIENT
Start: 2025-07-31 | End: 2025-08-05 | Stop reason: HOSPADM

## 2025-07-31 RX ORDER — MAGNESIUM SULFATE HEPTAHYDRATE 40 MG/ML
4 INJECTION, SOLUTION INTRAVENOUS ONCE
Status: COMPLETED | OUTPATIENT
Start: 2025-07-31 | End: 2025-07-31

## 2025-07-31 RX ORDER — SODIUM CHLORIDE 0.9 % (FLUSH) 0.9 %
10 SYRINGE (ML) INJECTION EVERY 12 HOURS SCHEDULED
Status: DISCONTINUED | OUTPATIENT
Start: 2025-07-31 | End: 2025-08-05 | Stop reason: HOSPADM

## 2025-07-31 RX ORDER — ONDANSETRON 2 MG/ML
4 INJECTION INTRAMUSCULAR; INTRAVENOUS EVERY 6 HOURS PRN
Status: DISCONTINUED | OUTPATIENT
Start: 2025-07-31 | End: 2025-08-05 | Stop reason: HOSPADM

## 2025-07-31 RX ORDER — SODIUM CHLORIDE 0.9 % (FLUSH) 0.9 %
10 SYRINGE (ML) INJECTION AS NEEDED
Status: DISCONTINUED | OUTPATIENT
Start: 2025-07-31 | End: 2025-08-05 | Stop reason: HOSPADM

## 2025-07-31 RX ORDER — METOPROLOL SUCCINATE 50 MG/1
50 TABLET, EXTENDED RELEASE ORAL DAILY
Status: DISCONTINUED | OUTPATIENT
Start: 2025-08-01 | End: 2025-08-01

## 2025-07-31 RX ORDER — BISACODYL 5 MG/1
5 TABLET, DELAYED RELEASE ORAL DAILY PRN
Status: DISCONTINUED | OUTPATIENT
Start: 2025-07-31 | End: 2025-08-05 | Stop reason: HOSPADM

## 2025-07-31 RX ORDER — ACETAMINOPHEN 325 MG/1
650 TABLET ORAL EVERY 4 HOURS PRN
Status: DISCONTINUED | OUTPATIENT
Start: 2025-07-31 | End: 2025-08-05 | Stop reason: HOSPADM

## 2025-07-31 RX ORDER — FLUTICASONE PROPIONATE 50 MCG
2 SPRAY, SUSPENSION (ML) NASAL DAILY
Status: DISCONTINUED | OUTPATIENT
Start: 2025-08-01 | End: 2025-08-05 | Stop reason: HOSPADM

## 2025-07-31 RX ORDER — BISACODYL 10 MG
10 SUPPOSITORY, RECTAL RECTAL DAILY PRN
Status: DISCONTINUED | OUTPATIENT
Start: 2025-07-31 | End: 2025-08-05 | Stop reason: HOSPADM

## 2025-07-31 RX ADMIN — SODIUM CHLORIDE 500 ML: 9 INJECTION, SOLUTION INTRAVENOUS at 17:01

## 2025-07-31 RX ADMIN — SODIUM CHLORIDE 75 ML/HR: 9 INJECTION, SOLUTION INTRAVENOUS at 21:22

## 2025-07-31 RX ADMIN — METOPROLOL TARTRATE 5 MG: 5 INJECTION INTRAVENOUS at 15:52

## 2025-07-31 RX ADMIN — ALPRAZOLAM 0.5 MG: 0.5 TABLET ORAL at 21:22

## 2025-07-31 RX ADMIN — ACETAMINOPHEN 650 MG: 325 TABLET ORAL at 23:31

## 2025-07-31 RX ADMIN — METOPROLOL TARTRATE 25 MG: 25 TABLET, FILM COATED ORAL at 15:49

## 2025-07-31 RX ADMIN — MAGNESIUM SULFATE IN WATER FOR 4 G: 40 INJECTION INTRAVENOUS at 19:28

## 2025-07-31 RX ADMIN — Medication 10 ML: at 19:29

## 2025-07-31 RX ADMIN — NITROFURANTOIN MONOHYDRATE/MACROCRYSTALLINE 100 MG: 25; 75 CAPSULE ORAL at 21:22

## 2025-07-31 RX ADMIN — MAGNESIUM OXIDE TAB 400 MG (241.3 MG ELEMENTAL MG) 800 MG: 400 (241.3 MG) TAB at 17:02

## 2025-08-01 LAB
ANION GAP SERPL CALCULATED.3IONS-SCNC: 8.8 MMOL/L (ref 5–15)
BASOPHILS # BLD AUTO: 0.02 10*3/MM3 (ref 0–0.2)
BASOPHILS NFR BLD AUTO: 0.4 % (ref 0–1.5)
BUN SERPL-MCNC: 12 MG/DL (ref 8–23)
BUN/CREAT SERPL: 19.7 (ref 7–25)
CALCIUM SPEC-SCNC: 9.5 MG/DL (ref 8.2–9.6)
CHLORIDE SERPL-SCNC: 99 MMOL/L (ref 98–107)
CO2 SERPL-SCNC: 23.2 MMOL/L (ref 22–29)
CREAT SERPL-MCNC: 0.61 MG/DL (ref 0.57–1)
DEPRECATED RDW RBC AUTO: 41 FL (ref 37–54)
EGFRCR SERPLBLD CKD-EPI 2021: 85.1 ML/MIN/1.73
EOSINOPHIL # BLD AUTO: 0.13 10*3/MM3 (ref 0–0.4)
EOSINOPHIL NFR BLD AUTO: 2.3 % (ref 0.3–6.2)
ERYTHROCYTE [DISTWIDTH] IN BLOOD BY AUTOMATED COUNT: 12 % (ref 12.3–15.4)
FOLATE SERPL-MCNC: >20 NG/ML (ref 4.78–24.2)
GLUCOSE SERPL-MCNC: 90 MG/DL (ref 65–99)
HCT VFR BLD AUTO: 38.3 % (ref 34–46.6)
HGB BLD-MCNC: 12.7 G/DL (ref 12–15.9)
IMM GRANULOCYTES # BLD AUTO: 0.02 10*3/MM3 (ref 0–0.05)
IMM GRANULOCYTES NFR BLD AUTO: 0.4 % (ref 0–0.5)
LYMPHOCYTES # BLD AUTO: 1.43 10*3/MM3 (ref 0.7–3.1)
LYMPHOCYTES NFR BLD AUTO: 25.5 % (ref 19.6–45.3)
MCH RBC QN AUTO: 31.1 PG (ref 26.6–33)
MCHC RBC AUTO-ENTMCNC: 33.2 G/DL (ref 31.5–35.7)
MCV RBC AUTO: 93.6 FL (ref 79–97)
MONOCYTES # BLD AUTO: 0.62 10*3/MM3 (ref 0.1–0.9)
MONOCYTES NFR BLD AUTO: 11.1 % (ref 5–12)
NEUTROPHILS NFR BLD AUTO: 3.39 10*3/MM3 (ref 1.7–7)
NEUTROPHILS NFR BLD AUTO: 60.3 % (ref 42.7–76)
NRBC BLD AUTO-RTO: 0 /100 WBC (ref 0–0.2)
PLATELET # BLD AUTO: 179 10*3/MM3 (ref 140–450)
PMV BLD AUTO: 10.9 FL (ref 6–12)
POTASSIUM SERPL-SCNC: 3.4 MMOL/L (ref 3.5–5.2)
POTASSIUM SERPL-SCNC: 5.3 MMOL/L (ref 3.5–5.2)
QT INTERVAL: 433 MS
QTC INTERVAL: 495 MS
RBC # BLD AUTO: 4.09 10*6/MM3 (ref 3.77–5.28)
SODIUM SERPL-SCNC: 131 MMOL/L (ref 136–145)
TSH SERPL DL<=0.05 MIU/L-ACNC: 1.32 UIU/ML (ref 0.27–4.2)
VIT B12 BLD-MCNC: 482 PG/ML (ref 211–946)
WBC NRBC COR # BLD AUTO: 5.61 10*3/MM3 (ref 3.4–10.8)

## 2025-08-01 PROCEDURE — 84443 ASSAY THYROID STIM HORMONE: CPT | Performed by: STUDENT IN AN ORGANIZED HEALTH CARE EDUCATION/TRAINING PROGRAM

## 2025-08-01 PROCEDURE — 99214 OFFICE O/P EST MOD 30 MIN: CPT | Performed by: INTERNAL MEDICINE

## 2025-08-01 PROCEDURE — 82607 VITAMIN B-12: CPT | Performed by: STUDENT IN AN ORGANIZED HEALTH CARE EDUCATION/TRAINING PROGRAM

## 2025-08-01 PROCEDURE — 84132 ASSAY OF SERUM POTASSIUM: CPT | Performed by: HOSPITALIST

## 2025-08-01 PROCEDURE — G0378 HOSPITAL OBSERVATION PER HR: HCPCS

## 2025-08-01 PROCEDURE — 85025 COMPLETE CBC W/AUTO DIFF WBC: CPT | Performed by: STUDENT IN AN ORGANIZED HEALTH CARE EDUCATION/TRAINING PROGRAM

## 2025-08-01 PROCEDURE — 80048 BASIC METABOLIC PNL TOTAL CA: CPT | Performed by: STUDENT IN AN ORGANIZED HEALTH CARE EDUCATION/TRAINING PROGRAM

## 2025-08-01 PROCEDURE — 99221 1ST HOSP IP/OBS SF/LOW 40: CPT | Performed by: PHYSICIAN ASSISTANT

## 2025-08-01 PROCEDURE — 97530 THERAPEUTIC ACTIVITIES: CPT

## 2025-08-01 PROCEDURE — 82746 ASSAY OF FOLIC ACID SERUM: CPT | Performed by: STUDENT IN AN ORGANIZED HEALTH CARE EDUCATION/TRAINING PROGRAM

## 2025-08-01 PROCEDURE — 36415 COLL VENOUS BLD VENIPUNCTURE: CPT | Performed by: STUDENT IN AN ORGANIZED HEALTH CARE EDUCATION/TRAINING PROGRAM

## 2025-08-01 PROCEDURE — 97162 PT EVAL MOD COMPLEX 30 MIN: CPT

## 2025-08-01 RX ORDER — POLYETHYLENE GLYCOL 3350 17 G/17G
17 POWDER, FOR SOLUTION ORAL DAILY
Status: DISCONTINUED | OUTPATIENT
Start: 2025-08-01 | End: 2025-08-05 | Stop reason: HOSPADM

## 2025-08-01 RX ORDER — HYDROCODONE BITARTRATE AND ACETAMINOPHEN 5; 325 MG/1; MG/1
1 TABLET ORAL EVERY 6 HOURS PRN
Refills: 0 | Status: DISCONTINUED | OUTPATIENT
Start: 2025-08-01 | End: 2025-08-05 | Stop reason: HOSPADM

## 2025-08-01 RX ORDER — HYDROCORTISONE 25 MG/G
CREAM TOPICAL 2 TIMES DAILY
Status: DISCONTINUED | OUTPATIENT
Start: 2025-08-01 | End: 2025-08-05 | Stop reason: HOSPADM

## 2025-08-01 RX ORDER — CAPSAICIN 0.07 G/100G
1 CREAM TOPICAL EVERY 8 HOURS SCHEDULED
Status: DISCONTINUED | OUTPATIENT
Start: 2025-08-01 | End: 2025-08-05 | Stop reason: HOSPADM

## 2025-08-01 RX ORDER — FLUTICASONE PROPIONATE 50 MCG
2 SPRAY, SUSPENSION (ML) NASAL DAILY
COMMUNITY

## 2025-08-01 RX ORDER — DEXAMETHASONE SODIUM PHOSPHATE 1 MG/ML
1 SOLUTION/ DROPS OPHTHALMIC
COMMUNITY

## 2025-08-01 RX ORDER — PANTOPRAZOLE SODIUM 40 MG/1
40 TABLET, DELAYED RELEASE ORAL
Status: DISCONTINUED | OUTPATIENT
Start: 2025-08-01 | End: 2025-08-05 | Stop reason: HOSPADM

## 2025-08-01 RX ORDER — METOPROLOL SUCCINATE 50 MG/1
50 TABLET, EXTENDED RELEASE ORAL EVERY 12 HOURS SCHEDULED
Status: DISCONTINUED | OUTPATIENT
Start: 2025-08-01 | End: 2025-08-05 | Stop reason: HOSPADM

## 2025-08-01 RX ORDER — POTASSIUM CHLORIDE 1500 MG/1
40 TABLET, EXTENDED RELEASE ORAL EVERY 4 HOURS
Status: COMPLETED | OUTPATIENT
Start: 2025-08-01 | End: 2025-08-01

## 2025-08-01 RX ADMIN — Medication 10 ML: at 22:23

## 2025-08-01 RX ADMIN — METOPROLOL SUCCINATE 50 MG: 50 TABLET, EXTENDED RELEASE ORAL at 22:22

## 2025-08-01 RX ADMIN — CAPSAICIN 1 APPLICATION: 0.75 CREAM TOPICAL at 22:23

## 2025-08-01 RX ADMIN — ACETAMINOPHEN 650 MG: 325 TABLET ORAL at 08:31

## 2025-08-01 RX ADMIN — HYDROCORTISONE: 25 CREAM TOPICAL at 22:24

## 2025-08-01 RX ADMIN — PANTOPRAZOLE SODIUM 40 MG: 40 TABLET, DELAYED RELEASE ORAL at 12:42

## 2025-08-01 RX ADMIN — ACETAMINOPHEN 650 MG: 325 TABLET ORAL at 13:43

## 2025-08-01 RX ADMIN — POTASSIUM CHLORIDE 40 MEQ: 1500 TABLET, EXTENDED RELEASE ORAL at 12:42

## 2025-08-01 RX ADMIN — POTASSIUM CHLORIDE 40 MEQ: 1500 TABLET, EXTENDED RELEASE ORAL at 16:17

## 2025-08-01 RX ADMIN — HYDROCODONE BITARTRATE AND ACETAMINOPHEN 1 TABLET: 5; 325 TABLET ORAL at 22:22

## 2025-08-01 RX ADMIN — POLYETHYLENE GLYCOL 3350 17 G: 17 POWDER, FOR SOLUTION ORAL at 17:58

## 2025-08-01 RX ADMIN — NITROFURANTOIN MONOHYDRATE/MACROCRYSTALLINE 100 MG: 25; 75 CAPSULE ORAL at 22:22

## 2025-08-01 RX ADMIN — METOPROLOL SUCCINATE 50 MG: 50 TABLET, EXTENDED RELEASE ORAL at 08:29

## 2025-08-01 RX ADMIN — Medication 10 ML: at 08:30

## 2025-08-01 RX ADMIN — HYDROCORTISONE: 25 CREAM TOPICAL at 13:43

## 2025-08-01 RX ADMIN — CAPSAICIN 1 APPLICATION: 0.75 CREAM TOPICAL at 13:44

## 2025-08-01 RX ADMIN — ALPRAZOLAM 0.5 MG: 0.5 TABLET ORAL at 22:22

## 2025-08-01 RX ADMIN — HYDROCODONE BITARTRATE AND ACETAMINOPHEN 1 TABLET: 5; 325 TABLET ORAL at 16:17

## 2025-08-02 LAB
ANION GAP SERPL CALCULATED.3IONS-SCNC: 10.2 MMOL/L (ref 5–15)
BASOPHILS # BLD AUTO: 0.03 10*3/MM3 (ref 0–0.2)
BASOPHILS NFR BLD AUTO: 0.5 % (ref 0–1.5)
BUN SERPL-MCNC: 16 MG/DL (ref 8–23)
BUN/CREAT SERPL: 22.2 (ref 7–25)
CALCIUM SPEC-SCNC: 9.8 MG/DL (ref 8.2–9.6)
CHLORIDE SERPL-SCNC: 102 MMOL/L (ref 98–107)
CO2 SERPL-SCNC: 20.8 MMOL/L (ref 22–29)
CREAT SERPL-MCNC: 0.72 MG/DL (ref 0.57–1)
DEPRECATED RDW RBC AUTO: 41 FL (ref 37–54)
EGFRCR SERPLBLD CKD-EPI 2021: 79.5 ML/MIN/1.73
EOSINOPHIL # BLD AUTO: 0.21 10*3/MM3 (ref 0–0.4)
EOSINOPHIL NFR BLD AUTO: 3.6 % (ref 0.3–6.2)
ERYTHROCYTE [DISTWIDTH] IN BLOOD BY AUTOMATED COUNT: 12.4 % (ref 12.3–15.4)
GLUCOSE SERPL-MCNC: 102 MG/DL (ref 65–99)
HCT VFR BLD AUTO: 35.9 % (ref 34–46.6)
HGB BLD-MCNC: 12.3 G/DL (ref 12–15.9)
IMM GRANULOCYTES # BLD AUTO: 0.01 10*3/MM3 (ref 0–0.05)
IMM GRANULOCYTES NFR BLD AUTO: 0.2 % (ref 0–0.5)
LYMPHOCYTES # BLD AUTO: 2.06 10*3/MM3 (ref 0.7–3.1)
LYMPHOCYTES NFR BLD AUTO: 35 % (ref 19.6–45.3)
MAGNESIUM SERPL-MCNC: 1.9 MG/DL (ref 1.6–2.4)
MCH RBC QN AUTO: 31.3 PG (ref 26.6–33)
MCHC RBC AUTO-ENTMCNC: 34.3 G/DL (ref 31.5–35.7)
MCV RBC AUTO: 91.3 FL (ref 79–97)
MONOCYTES # BLD AUTO: 0.56 10*3/MM3 (ref 0.1–0.9)
MONOCYTES NFR BLD AUTO: 9.5 % (ref 5–12)
NEUTROPHILS NFR BLD AUTO: 3.01 10*3/MM3 (ref 1.7–7)
NEUTROPHILS NFR BLD AUTO: 51.2 % (ref 42.7–76)
NRBC BLD AUTO-RTO: 0 /100 WBC (ref 0–0.2)
PLATELET # BLD AUTO: 174 10*3/MM3 (ref 140–450)
PMV BLD AUTO: 11.3 FL (ref 6–12)
POTASSIUM SERPL-SCNC: 4.9 MMOL/L (ref 3.5–5.2)
RBC # BLD AUTO: 3.93 10*6/MM3 (ref 3.77–5.28)
SODIUM SERPL-SCNC: 133 MMOL/L (ref 136–145)
WBC NRBC COR # BLD AUTO: 5.88 10*3/MM3 (ref 3.4–10.8)

## 2025-08-02 PROCEDURE — 25010000002 ONDANSETRON PER 1 MG: Performed by: STUDENT IN AN ORGANIZED HEALTH CARE EDUCATION/TRAINING PROGRAM

## 2025-08-02 PROCEDURE — 25010000002 MAGNESIUM SULFATE 4 GM/100ML SOLUTION: Performed by: HOSPITALIST

## 2025-08-02 PROCEDURE — 36415 COLL VENOUS BLD VENIPUNCTURE: CPT | Performed by: STUDENT IN AN ORGANIZED HEALTH CARE EDUCATION/TRAINING PROGRAM

## 2025-08-02 PROCEDURE — 80048 BASIC METABOLIC PNL TOTAL CA: CPT | Performed by: STUDENT IN AN ORGANIZED HEALTH CARE EDUCATION/TRAINING PROGRAM

## 2025-08-02 PROCEDURE — 83735 ASSAY OF MAGNESIUM: CPT | Performed by: HOSPITALIST

## 2025-08-02 PROCEDURE — 85025 COMPLETE CBC W/AUTO DIFF WBC: CPT | Performed by: STUDENT IN AN ORGANIZED HEALTH CARE EDUCATION/TRAINING PROGRAM

## 2025-08-02 PROCEDURE — 99214 OFFICE O/P EST MOD 30 MIN: CPT | Performed by: INTERNAL MEDICINE

## 2025-08-02 PROCEDURE — G0378 HOSPITAL OBSERVATION PER HR: HCPCS

## 2025-08-02 RX ORDER — MAGNESIUM SULFATE HEPTAHYDRATE 40 MG/ML
4 INJECTION, SOLUTION INTRAVENOUS ONCE
Status: COMPLETED | OUTPATIENT
Start: 2025-08-02 | End: 2025-08-02

## 2025-08-02 RX ADMIN — Medication 10 ML: at 09:03

## 2025-08-02 RX ADMIN — ONDANSETRON 4 MG: 2 INJECTION INTRAMUSCULAR; INTRAVENOUS at 03:24

## 2025-08-02 RX ADMIN — CAPSAICIN 1 APPLICATION: 0.75 CREAM TOPICAL at 23:02

## 2025-08-02 RX ADMIN — POLYETHYLENE GLYCOL 3350 17 G: 17 POWDER, FOR SOLUTION ORAL at 08:50

## 2025-08-02 RX ADMIN — CAPSAICIN 1 APPLICATION: 0.75 CREAM TOPICAL at 06:07

## 2025-08-02 RX ADMIN — MAGNESIUM SULFATE IN WATER FOR 4 G: 40 INJECTION INTRAVENOUS at 13:58

## 2025-08-02 RX ADMIN — NITROFURANTOIN MONOHYDRATE/MACROCRYSTALLINE 100 MG: 25; 75 CAPSULE ORAL at 23:02

## 2025-08-02 RX ADMIN — CAPSAICIN 1 APPLICATION: 0.75 CREAM TOPICAL at 13:58

## 2025-08-02 RX ADMIN — ONDANSETRON 4 MG: 2 INJECTION INTRAMUSCULAR; INTRAVENOUS at 17:47

## 2025-08-02 RX ADMIN — HYDROCORTISONE: 25 CREAM TOPICAL at 23:02

## 2025-08-02 RX ADMIN — METOPROLOL SUCCINATE 50 MG: 50 TABLET, EXTENDED RELEASE ORAL at 23:02

## 2025-08-02 RX ADMIN — FLUTICASONE PROPIONATE 2 SPRAY: 50 SPRAY, METERED NASAL at 09:04

## 2025-08-02 RX ADMIN — APIXABAN 2.5 MG: 2.5 TABLET, FILM COATED ORAL at 23:02

## 2025-08-02 RX ADMIN — Medication 10 ML: at 23:03

## 2025-08-02 RX ADMIN — HYDROCODONE BITARTRATE AND ACETAMINOPHEN 1 TABLET: 5; 325 TABLET ORAL at 10:50

## 2025-08-02 RX ADMIN — HYDROCODONE BITARTRATE AND ACETAMINOPHEN 1 TABLET: 5; 325 TABLET ORAL at 23:02

## 2025-08-02 RX ADMIN — HYDROCORTISONE: 25 CREAM TOPICAL at 09:04

## 2025-08-02 RX ADMIN — METOPROLOL SUCCINATE 50 MG: 50 TABLET, EXTENDED RELEASE ORAL at 09:03

## 2025-08-02 RX ADMIN — PANTOPRAZOLE SODIUM 40 MG: 40 TABLET, DELAYED RELEASE ORAL at 06:07

## 2025-08-03 PROBLEM — E43 SEVERE PROTEIN-CALORIE MALNUTRITION: Status: ACTIVE | Noted: 2025-08-03

## 2025-08-03 LAB
ANION GAP SERPL CALCULATED.3IONS-SCNC: 8 MMOL/L (ref 5–15)
BASOPHILS # BLD AUTO: 0.02 10*3/MM3 (ref 0–0.2)
BASOPHILS NFR BLD AUTO: 0.3 % (ref 0–1.5)
BUN SERPL-MCNC: 14 MG/DL (ref 8–23)
BUN/CREAT SERPL: 22.2 (ref 7–25)
CALCIUM SPEC-SCNC: 9.6 MG/DL (ref 8.2–9.6)
CHLORIDE SERPL-SCNC: 100 MMOL/L (ref 98–107)
CO2 SERPL-SCNC: 23 MMOL/L (ref 22–29)
CREAT SERPL-MCNC: 0.63 MG/DL (ref 0.57–1)
DEPRECATED RDW RBC AUTO: 41.5 FL (ref 37–54)
EGFRCR SERPLBLD CKD-EPI 2021: 84.4 ML/MIN/1.73
EOSINOPHIL # BLD AUTO: 0.23 10*3/MM3 (ref 0–0.4)
EOSINOPHIL NFR BLD AUTO: 3.8 % (ref 0.3–6.2)
ERYTHROCYTE [DISTWIDTH] IN BLOOD BY AUTOMATED COUNT: 12.3 % (ref 12.3–15.4)
GLUCOSE SERPL-MCNC: 100 MG/DL (ref 65–99)
HCT VFR BLD AUTO: 38.4 % (ref 34–46.6)
HGB BLD-MCNC: 13.1 G/DL (ref 12–15.9)
IMM GRANULOCYTES # BLD AUTO: 0.02 10*3/MM3 (ref 0–0.05)
IMM GRANULOCYTES NFR BLD AUTO: 0.3 % (ref 0–0.5)
LYMPHOCYTES # BLD AUTO: 1.66 10*3/MM3 (ref 0.7–3.1)
LYMPHOCYTES NFR BLD AUTO: 27.6 % (ref 19.6–45.3)
MAGNESIUM SERPL-MCNC: 2.1 MG/DL (ref 1.6–2.4)
MCH RBC QN AUTO: 31.6 PG (ref 26.6–33)
MCHC RBC AUTO-ENTMCNC: 34.1 G/DL (ref 31.5–35.7)
MCV RBC AUTO: 92.5 FL (ref 79–97)
MONOCYTES # BLD AUTO: 0.51 10*3/MM3 (ref 0.1–0.9)
MONOCYTES NFR BLD AUTO: 8.5 % (ref 5–12)
NEUTROPHILS NFR BLD AUTO: 3.58 10*3/MM3 (ref 1.7–7)
NEUTROPHILS NFR BLD AUTO: 59.5 % (ref 42.7–76)
NRBC BLD AUTO-RTO: 0 /100 WBC (ref 0–0.2)
PLATELET # BLD AUTO: 196 10*3/MM3 (ref 140–450)
PMV BLD AUTO: 10.7 FL (ref 6–12)
POTASSIUM SERPL-SCNC: 4.6 MMOL/L (ref 3.5–5.2)
RBC # BLD AUTO: 4.15 10*6/MM3 (ref 3.77–5.28)
SODIUM SERPL-SCNC: 131 MMOL/L (ref 136–145)
WBC NRBC COR # BLD AUTO: 6.02 10*3/MM3 (ref 3.4–10.8)

## 2025-08-03 PROCEDURE — 80048 BASIC METABOLIC PNL TOTAL CA: CPT | Performed by: STUDENT IN AN ORGANIZED HEALTH CARE EDUCATION/TRAINING PROGRAM

## 2025-08-03 PROCEDURE — 36415 COLL VENOUS BLD VENIPUNCTURE: CPT | Performed by: STUDENT IN AN ORGANIZED HEALTH CARE EDUCATION/TRAINING PROGRAM

## 2025-08-03 PROCEDURE — 99232 SBSQ HOSP IP/OBS MODERATE 35: CPT | Performed by: NURSE PRACTITIONER

## 2025-08-03 PROCEDURE — 85025 COMPLETE CBC W/AUTO DIFF WBC: CPT | Performed by: STUDENT IN AN ORGANIZED HEALTH CARE EDUCATION/TRAINING PROGRAM

## 2025-08-03 PROCEDURE — 83735 ASSAY OF MAGNESIUM: CPT | Performed by: HOSPITALIST

## 2025-08-03 RX ORDER — AMLODIPINE BESYLATE 5 MG/1
5 TABLET ORAL
Status: DISCONTINUED | OUTPATIENT
Start: 2025-08-03 | End: 2025-08-04

## 2025-08-03 RX ADMIN — APIXABAN 2.5 MG: 2.5 TABLET, FILM COATED ORAL at 08:14

## 2025-08-03 RX ADMIN — POLYETHYLENE GLYCOL 3350 17 G: 17 POWDER, FOR SOLUTION ORAL at 08:14

## 2025-08-03 RX ADMIN — CAPSAICIN 1 APPLICATION: 0.75 CREAM TOPICAL at 13:29

## 2025-08-03 RX ADMIN — HYDROCORTISONE: 25 CREAM TOPICAL at 20:32

## 2025-08-03 RX ADMIN — METOPROLOL SUCCINATE 50 MG: 50 TABLET, EXTENDED RELEASE ORAL at 08:14

## 2025-08-03 RX ADMIN — PANTOPRAZOLE SODIUM 40 MG: 40 TABLET, DELAYED RELEASE ORAL at 06:23

## 2025-08-03 RX ADMIN — CAPSAICIN 1 APPLICATION: 0.75 CREAM TOPICAL at 20:32

## 2025-08-03 RX ADMIN — FLUTICASONE PROPIONATE 2 SPRAY: 50 SPRAY, METERED NASAL at 08:14

## 2025-08-03 RX ADMIN — METOPROLOL SUCCINATE 50 MG: 50 TABLET, EXTENDED RELEASE ORAL at 20:32

## 2025-08-03 RX ADMIN — HYDROCORTISONE: 25 CREAM TOPICAL at 08:14

## 2025-08-03 RX ADMIN — NITROFURANTOIN MONOHYDRATE/MACROCRYSTALLINE 100 MG: 25; 75 CAPSULE ORAL at 20:32

## 2025-08-03 RX ADMIN — CAPSAICIN 1 APPLICATION: 0.75 CREAM TOPICAL at 06:23

## 2025-08-03 RX ADMIN — APIXABAN 2.5 MG: 2.5 TABLET, FILM COATED ORAL at 20:32

## 2025-08-03 RX ADMIN — AMLODIPINE BESYLATE 5 MG: 5 TABLET ORAL at 13:29

## 2025-08-03 RX ADMIN — ACETAMINOPHEN 650 MG: 325 TABLET ORAL at 19:33

## 2025-08-03 RX ADMIN — Medication 10 ML: at 20:32

## 2025-08-03 RX ADMIN — HYDROCODONE BITARTRATE AND ACETAMINOPHEN 1 TABLET: 5; 325 TABLET ORAL at 10:32

## 2025-08-04 ENCOUNTER — APPOINTMENT (OUTPATIENT)
Dept: GENERAL RADIOLOGY | Facility: HOSPITAL | Age: OVER 89
DRG: 308 | End: 2025-08-04
Payer: MEDICARE

## 2025-08-04 LAB
ANION GAP SERPL CALCULATED.3IONS-SCNC: 11.3 MMOL/L (ref 5–15)
B PARAPERT DNA SPEC QL NAA+PROBE: NOT DETECTED
B PERT DNA SPEC QL NAA+PROBE: NOT DETECTED
BASOPHILS # BLD AUTO: 0.03 10*3/MM3 (ref 0–0.2)
BASOPHILS NFR BLD AUTO: 0.4 % (ref 0–1.5)
BUN SERPL-MCNC: 12 MG/DL (ref 8–23)
BUN/CREAT SERPL: 19 (ref 7–25)
C PNEUM DNA NPH QL NAA+NON-PROBE: NOT DETECTED
CALCIUM SPEC-SCNC: 10.4 MG/DL (ref 8.2–9.6)
CHLORIDE SERPL-SCNC: 94 MMOL/L (ref 98–107)
CO2 SERPL-SCNC: 22.7 MMOL/L (ref 22–29)
CREAT SERPL-MCNC: 0.63 MG/DL (ref 0.57–1)
DEPRECATED RDW RBC AUTO: 43.8 FL (ref 37–54)
EGFRCR SERPLBLD CKD-EPI 2021: 84.4 ML/MIN/1.73
EOSINOPHIL # BLD AUTO: 0.14 10*3/MM3 (ref 0–0.4)
EOSINOPHIL NFR BLD AUTO: 2 % (ref 0.3–6.2)
ERYTHROCYTE [DISTWIDTH] IN BLOOD BY AUTOMATED COUNT: 12.3 % (ref 12.3–15.4)
FLUAV SUBTYP SPEC NAA+PROBE: NOT DETECTED
FLUBV RNA NPH QL NAA+NON-PROBE: NOT DETECTED
GLUCOSE SERPL-MCNC: 103 MG/DL (ref 65–99)
HADV DNA SPEC NAA+PROBE: NOT DETECTED
HCOV 229E RNA SPEC QL NAA+PROBE: NOT DETECTED
HCOV HKU1 RNA SPEC QL NAA+PROBE: NOT DETECTED
HCOV NL63 RNA SPEC QL NAA+PROBE: NOT DETECTED
HCOV OC43 RNA SPEC QL NAA+PROBE: NOT DETECTED
HCT VFR BLD AUTO: 43.1 % (ref 34–46.6)
HGB BLD-MCNC: 14 G/DL (ref 12–15.9)
HMPV RNA NPH QL NAA+NON-PROBE: NOT DETECTED
HPIV1 RNA ISLT QL NAA+PROBE: NOT DETECTED
HPIV2 RNA SPEC QL NAA+PROBE: NOT DETECTED
HPIV3 RNA NPH QL NAA+PROBE: NOT DETECTED
HPIV4 P GENE NPH QL NAA+PROBE: NOT DETECTED
IMM GRANULOCYTES # BLD AUTO: 0.03 10*3/MM3 (ref 0–0.05)
IMM GRANULOCYTES NFR BLD AUTO: 0.4 % (ref 0–0.5)
LYMPHOCYTES # BLD AUTO: 1.35 10*3/MM3 (ref 0.7–3.1)
LYMPHOCYTES NFR BLD AUTO: 19.6 % (ref 19.6–45.3)
M PNEUMO IGG SER IA-ACNC: NOT DETECTED
MCH RBC QN AUTO: 31.3 PG (ref 26.6–33)
MCHC RBC AUTO-ENTMCNC: 32.5 G/DL (ref 31.5–35.7)
MCV RBC AUTO: 96.4 FL (ref 79–97)
MONOCYTES # BLD AUTO: 0.55 10*3/MM3 (ref 0.1–0.9)
MONOCYTES NFR BLD AUTO: 8 % (ref 5–12)
NEUTROPHILS NFR BLD AUTO: 4.8 10*3/MM3 (ref 1.7–7)
NEUTROPHILS NFR BLD AUTO: 69.6 % (ref 42.7–76)
NRBC BLD AUTO-RTO: 0 /100 WBC (ref 0–0.2)
PLATELET # BLD AUTO: 223 10*3/MM3 (ref 140–450)
PMV BLD AUTO: 10.9 FL (ref 6–12)
POTASSIUM SERPL-SCNC: 4 MMOL/L (ref 3.5–5.2)
RBC # BLD AUTO: 4.47 10*6/MM3 (ref 3.77–5.28)
RHINOVIRUS RNA SPEC NAA+PROBE: NOT DETECTED
RSV RNA NPH QL NAA+NON-PROBE: NOT DETECTED
SARS-COV-2 RNA NPH QL NAA+NON-PROBE: NOT DETECTED
SODIUM SERPL-SCNC: 128 MMOL/L (ref 136–145)
WBC NRBC COR # BLD AUTO: 6.9 10*3/MM3 (ref 3.4–10.8)

## 2025-08-04 PROCEDURE — 71046 X-RAY EXAM CHEST 2 VIEWS: CPT

## 2025-08-04 PROCEDURE — 99232 SBSQ HOSP IP/OBS MODERATE 35: CPT | Performed by: NURSE PRACTITIONER

## 2025-08-04 PROCEDURE — 85025 COMPLETE CBC W/AUTO DIFF WBC: CPT | Performed by: STUDENT IN AN ORGANIZED HEALTH CARE EDUCATION/TRAINING PROGRAM

## 2025-08-04 PROCEDURE — 25010000002 ONDANSETRON PER 1 MG: Performed by: STUDENT IN AN ORGANIZED HEALTH CARE EDUCATION/TRAINING PROGRAM

## 2025-08-04 PROCEDURE — 80048 BASIC METABOLIC PNL TOTAL CA: CPT | Performed by: STUDENT IN AN ORGANIZED HEALTH CARE EDUCATION/TRAINING PROGRAM

## 2025-08-04 PROCEDURE — 97116 GAIT TRAINING THERAPY: CPT

## 2025-08-04 PROCEDURE — 0202U NFCT DS 22 TRGT SARS-COV-2: CPT | Performed by: INTERNAL MEDICINE

## 2025-08-04 RX ORDER — DILTIAZEM HYDROCHLORIDE 30 MG/1
30 TABLET, FILM COATED ORAL EVERY 6 HOURS SCHEDULED
Status: DISCONTINUED | OUTPATIENT
Start: 2025-08-04 | End: 2025-08-05 | Stop reason: HOSPADM

## 2025-08-04 RX ORDER — SUMATRIPTAN 50 MG/1
50 TABLET, FILM COATED ORAL
Status: DISCONTINUED | OUTPATIENT
Start: 2025-08-04 | End: 2025-08-05 | Stop reason: HOSPADM

## 2025-08-04 RX ADMIN — ACETAMINOPHEN 650 MG: 325 TABLET ORAL at 23:13

## 2025-08-04 RX ADMIN — DILTIAZEM HYDROCHLORIDE 30 MG: 30 TABLET, FILM COATED ORAL at 23:13

## 2025-08-04 RX ADMIN — HYDROCORTISONE: 25 CREAM TOPICAL at 20:53

## 2025-08-04 RX ADMIN — SUMATRIPTAN SUCCINATE 50 MG: 50 TABLET ORAL at 12:49

## 2025-08-04 RX ADMIN — PANTOPRAZOLE SODIUM 40 MG: 40 TABLET, DELAYED RELEASE ORAL at 09:24

## 2025-08-04 RX ADMIN — DILTIAZEM HYDROCHLORIDE 30 MG: 30 TABLET, FILM COATED ORAL at 17:25

## 2025-08-04 RX ADMIN — METOPROLOL SUCCINATE 50 MG: 50 TABLET, EXTENDED RELEASE ORAL at 20:52

## 2025-08-04 RX ADMIN — NITROFURANTOIN MONOHYDRATE/MACROCRYSTALLINE 100 MG: 25; 75 CAPSULE ORAL at 20:52

## 2025-08-04 RX ADMIN — ALPRAZOLAM 0.5 MG: 0.5 TABLET ORAL at 23:13

## 2025-08-04 RX ADMIN — APIXABAN 2.5 MG: 2.5 TABLET, FILM COATED ORAL at 09:25

## 2025-08-04 RX ADMIN — ACETAMINOPHEN 650 MG: 325 TABLET ORAL at 09:24

## 2025-08-04 RX ADMIN — CAPSAICIN 1 APPLICATION: 0.75 CREAM TOPICAL at 17:21

## 2025-08-04 RX ADMIN — HYDROCORTISONE: 25 CREAM TOPICAL at 09:26

## 2025-08-04 RX ADMIN — APIXABAN 2.5 MG: 2.5 TABLET, FILM COATED ORAL at 20:52

## 2025-08-04 RX ADMIN — CAPSAICIN 1 APPLICATION: 0.75 CREAM TOPICAL at 20:53

## 2025-08-04 RX ADMIN — DILTIAZEM HYDROCHLORIDE 30 MG: 30 TABLET, FILM COATED ORAL at 12:49

## 2025-08-04 RX ADMIN — AMLODIPINE BESYLATE 5 MG: 5 TABLET ORAL at 09:24

## 2025-08-04 RX ADMIN — FLUTICASONE PROPIONATE 2 SPRAY: 50 SPRAY, METERED NASAL at 09:26

## 2025-08-04 RX ADMIN — METOPROLOL SUCCINATE 50 MG: 50 TABLET, EXTENDED RELEASE ORAL at 09:24

## 2025-08-04 RX ADMIN — CAPSAICIN 1 APPLICATION: 0.75 CREAM TOPICAL at 09:25

## 2025-08-04 RX ADMIN — Medication 10 ML: at 09:26

## 2025-08-04 RX ADMIN — ONDANSETRON 4 MG: 2 INJECTION INTRAMUSCULAR; INTRAVENOUS at 07:08

## 2025-08-05 ENCOUNTER — READMISSION MANAGEMENT (OUTPATIENT)
Dept: CALL CENTER | Facility: HOSPITAL | Age: OVER 89
End: 2025-08-05
Payer: MEDICARE

## 2025-08-05 VITALS
BODY MASS INDEX: 19.05 KG/M2 | WEIGHT: 111.6 LBS | RESPIRATION RATE: 16 BRPM | HEART RATE: 72 BPM | SYSTOLIC BLOOD PRESSURE: 120 MMHG | TEMPERATURE: 98.2 F | DIASTOLIC BLOOD PRESSURE: 84 MMHG | HEIGHT: 64 IN | OXYGEN SATURATION: 99 %

## 2025-08-05 LAB
ANION GAP SERPL CALCULATED.3IONS-SCNC: 9 MMOL/L (ref 5–15)
BASOPHILS # BLD AUTO: 0.02 10*3/MM3 (ref 0–0.2)
BASOPHILS NFR BLD AUTO: 0.4 % (ref 0–1.5)
BUN SERPL-MCNC: 14 MG/DL (ref 8–23)
BUN/CREAT SERPL: 20.3 (ref 7–25)
CALCIUM SPEC-SCNC: 10.2 MG/DL (ref 8.2–9.6)
CHLORIDE SERPL-SCNC: 96 MMOL/L (ref 98–107)
CO2 SERPL-SCNC: 23 MMOL/L (ref 22–29)
CREAT SERPL-MCNC: 0.69 MG/DL (ref 0.57–1)
DEPRECATED RDW RBC AUTO: 40.4 FL (ref 37–54)
EGFRCR SERPLBLD CKD-EPI 2021: 82.6 ML/MIN/1.73
EOSINOPHIL # BLD AUTO: 0.17 10*3/MM3 (ref 0–0.4)
EOSINOPHIL NFR BLD AUTO: 3.2 % (ref 0.3–6.2)
ERYTHROCYTE [DISTWIDTH] IN BLOOD BY AUTOMATED COUNT: 11.8 % (ref 12.3–15.4)
GLUCOSE SERPL-MCNC: 99 MG/DL (ref 65–99)
HCT VFR BLD AUTO: 42.3 % (ref 34–46.6)
HGB BLD-MCNC: 14 G/DL (ref 12–15.9)
IMM GRANULOCYTES # BLD AUTO: 0.02 10*3/MM3 (ref 0–0.05)
IMM GRANULOCYTES NFR BLD AUTO: 0.4 % (ref 0–0.5)
LYMPHOCYTES # BLD AUTO: 1.48 10*3/MM3 (ref 0.7–3.1)
LYMPHOCYTES NFR BLD AUTO: 27.9 % (ref 19.6–45.3)
MCH RBC QN AUTO: 31 PG (ref 26.6–33)
MCHC RBC AUTO-ENTMCNC: 33.1 G/DL (ref 31.5–35.7)
MCV RBC AUTO: 93.8 FL (ref 79–97)
MONOCYTES # BLD AUTO: 0.58 10*3/MM3 (ref 0.1–0.9)
MONOCYTES NFR BLD AUTO: 10.9 % (ref 5–12)
NEUTROPHILS NFR BLD AUTO: 3.03 10*3/MM3 (ref 1.7–7)
NEUTROPHILS NFR BLD AUTO: 57.2 % (ref 42.7–76)
NRBC BLD AUTO-RTO: 0 /100 WBC (ref 0–0.2)
PLATELET # BLD AUTO: 210 10*3/MM3 (ref 140–450)
PMV BLD AUTO: 10.6 FL (ref 6–12)
POTASSIUM SERPL-SCNC: 4.1 MMOL/L (ref 3.5–5.2)
RBC # BLD AUTO: 4.51 10*6/MM3 (ref 3.77–5.28)
SODIUM SERPL-SCNC: 128 MMOL/L (ref 136–145)
WBC NRBC COR # BLD AUTO: 5.3 10*3/MM3 (ref 3.4–10.8)

## 2025-08-05 PROCEDURE — 36415 COLL VENOUS BLD VENIPUNCTURE: CPT | Performed by: STUDENT IN AN ORGANIZED HEALTH CARE EDUCATION/TRAINING PROGRAM

## 2025-08-05 PROCEDURE — 80048 BASIC METABOLIC PNL TOTAL CA: CPT | Performed by: STUDENT IN AN ORGANIZED HEALTH CARE EDUCATION/TRAINING PROGRAM

## 2025-08-05 PROCEDURE — 85025 COMPLETE CBC W/AUTO DIFF WBC: CPT | Performed by: STUDENT IN AN ORGANIZED HEALTH CARE EDUCATION/TRAINING PROGRAM

## 2025-08-05 RX ORDER — METOPROLOL SUCCINATE 50 MG/1
50 TABLET, EXTENDED RELEASE ORAL EVERY 12 HOURS SCHEDULED
Qty: 60 TABLET | Refills: 0 | Status: SHIPPED | OUTPATIENT
Start: 2025-08-05

## 2025-08-05 RX ORDER — DILTIAZEM HYDROCHLORIDE 30 MG/1
30 TABLET, FILM COATED ORAL EVERY 6 HOURS SCHEDULED
Qty: 120 TABLET | Refills: 0 | Status: SHIPPED | OUTPATIENT
Start: 2025-08-05

## 2025-08-05 RX ADMIN — CAPSAICIN 1 APPLICATION: 0.75 CREAM TOPICAL at 05:34

## 2025-08-05 RX ADMIN — ACETAMINOPHEN 650 MG: 325 TABLET ORAL at 09:15

## 2025-08-05 RX ADMIN — PANTOPRAZOLE SODIUM 40 MG: 40 TABLET, DELAYED RELEASE ORAL at 05:32

## 2025-08-05 RX ADMIN — DILTIAZEM HYDROCHLORIDE 30 MG: 30 TABLET, FILM COATED ORAL at 05:32

## 2025-08-05 RX ADMIN — HYDROCORTISONE: 25 CREAM TOPICAL at 10:19

## 2025-08-05 RX ADMIN — DILTIAZEM HYDROCHLORIDE 30 MG: 30 TABLET, FILM COATED ORAL at 12:21

## 2025-08-05 RX ADMIN — Medication 10 ML: at 09:00

## 2025-08-05 RX ADMIN — APIXABAN 2.5 MG: 2.5 TABLET, FILM COATED ORAL at 09:15

## 2025-08-05 RX ADMIN — POLYETHYLENE GLYCOL 3350 17 G: 17 POWDER, FOR SOLUTION ORAL at 10:18

## 2025-08-05 RX ADMIN — METOPROLOL SUCCINATE 50 MG: 50 TABLET, EXTENDED RELEASE ORAL at 09:15

## 2025-08-06 ENCOUNTER — TRANSITIONAL CARE MANAGEMENT TELEPHONE ENCOUNTER (OUTPATIENT)
Dept: CALL CENTER | Facility: HOSPITAL | Age: OVER 89
End: 2025-08-06
Payer: MEDICARE

## 2025-08-13 ENCOUNTER — TELEMEDICINE (OUTPATIENT)
Dept: INTERNAL MEDICINE | Facility: CLINIC | Age: OVER 89
End: 2025-08-13
Payer: MEDICARE

## 2025-08-13 DIAGNOSIS — G43.709 CHRONIC MIGRAINE WITHOUT AURA WITHOUT STATUS MIGRAINOSUS, NOT INTRACTABLE: ICD-10-CM

## 2025-08-13 DIAGNOSIS — R05.3 CHRONIC COUGH: ICD-10-CM

## 2025-08-13 DIAGNOSIS — R26.81 UNSTEADY GAIT: ICD-10-CM

## 2025-08-13 DIAGNOSIS — K64.9 HEMORRHOIDS, UNSPECIFIED HEMORRHOID TYPE: ICD-10-CM

## 2025-08-13 DIAGNOSIS — I48.20 CHRONIC ATRIAL FIBRILLATION WITH RAPID VENTRICULAR RESPONSE: ICD-10-CM

## 2025-08-13 DIAGNOSIS — E83.42 HYPOMAGNESEMIA: ICD-10-CM

## 2025-08-13 DIAGNOSIS — E87.1 HYPONATREMIA: Primary | ICD-10-CM

## 2025-08-13 PROCEDURE — 99214 OFFICE O/P EST MOD 30 MIN: CPT | Performed by: STUDENT IN AN ORGANIZED HEALTH CARE EDUCATION/TRAINING PROGRAM

## 2025-08-13 PROCEDURE — 1126F AMNT PAIN NOTED NONE PRSNT: CPT | Performed by: STUDENT IN AN ORGANIZED HEALTH CARE EDUCATION/TRAINING PROGRAM

## 2025-08-13 RX ORDER — BENZONATATE 200 MG/1
200 CAPSULE ORAL 3 TIMES DAILY PRN
Qty: 30 CAPSULE | Refills: 0 | Status: SHIPPED | OUTPATIENT
Start: 2025-08-13

## 2025-08-13 RX ORDER — HYDROCORTISONE 25 MG/G
CREAM TOPICAL 2 TIMES DAILY
Qty: 28 G | Refills: 0 | Status: SHIPPED | OUTPATIENT
Start: 2025-08-13

## (undated) DEVICE — SINGLE-USE BIOPSY FORCEPS: Brand: RADIAL JAW 4

## (undated) DEVICE — FRCP BX RADJAW4 NDL 2.8 240CM LG OG BX40

## (undated) DEVICE — BITEBLOCK OMNI BLOC

## (undated) DEVICE — LN SMPL CO2 SHTRM SD STREAM W/M LUER

## (undated) DEVICE — CANN O2 ETCO2 FITS ALL CONN CO2 SMPL A/ 7IN DISP LF

## (undated) DEVICE — MSK ENDO PORT O2 POM ELITE CURAPLEX A/

## (undated) DEVICE — ADAPT CLN BIOGUARD AIR/H2O DISP

## (undated) DEVICE — KT ORCA ORCAPOD DISP STRL

## (undated) DEVICE — TUBING, SUCTION, 1/4" X 10', STRAIGHT: Brand: MEDLINE

## (undated) DEVICE — SENSR O2 OXIMAX FNGR A/ 18IN NONSTR